# Patient Record
Sex: FEMALE | Race: WHITE | Employment: OTHER | URBAN - METROPOLITAN AREA
[De-identification: names, ages, dates, MRNs, and addresses within clinical notes are randomized per-mention and may not be internally consistent; named-entity substitution may affect disease eponyms.]

---

## 2018-01-25 ENCOUNTER — APPOINTMENT (OUTPATIENT)
Dept: ULTRASOUND IMAGING | Age: 83
DRG: 871 | End: 2018-01-25
Attending: HOSPITALIST
Payer: MEDICARE

## 2018-01-25 ENCOUNTER — HOSPITAL ENCOUNTER (INPATIENT)
Age: 83
LOS: 4 days | Discharge: NURSING, REHAB OR LONG-TERM CARE | DRG: 871 | End: 2018-01-29
Attending: EMERGENCY MEDICINE | Admitting: HOSPITALIST
Payer: MEDICARE

## 2018-01-25 ENCOUNTER — APPOINTMENT (OUTPATIENT)
Dept: NUCLEAR MEDICINE | Age: 83
DRG: 871 | End: 2018-01-25
Attending: HOSPITALIST
Payer: MEDICARE

## 2018-01-25 ENCOUNTER — APPOINTMENT (OUTPATIENT)
Dept: GENERAL RADIOLOGY | Age: 83
DRG: 871 | End: 2018-01-25
Attending: EMERGENCY MEDICINE
Payer: MEDICARE

## 2018-01-25 DIAGNOSIS — J18.9 COMMUNITY ACQUIRED PNEUMONIA, UNSPECIFIED LATERALITY: Primary | ICD-10-CM

## 2018-01-25 DIAGNOSIS — J96.01 ACUTE RESPIRATORY FAILURE WITH HYPOXIA (HCC): ICD-10-CM

## 2018-01-25 PROBLEM — N17.9 AKI (ACUTE KIDNEY INJURY) (HCC): Status: ACTIVE | Noted: 2018-01-25

## 2018-01-25 PROBLEM — R65.20 SEVERE SEPSIS (HCC): Status: ACTIVE | Noted: 2018-01-25

## 2018-01-25 PROBLEM — L97.929 LEG ULCER, LEFT (HCC): Status: ACTIVE | Noted: 2018-01-25

## 2018-01-25 PROBLEM — A41.9 SEVERE SEPSIS (HCC): Status: ACTIVE | Noted: 2018-01-25

## 2018-01-25 LAB
ALBUMIN SERPL-MCNC: 3 G/DL (ref 3.5–5)
ALBUMIN/GLOB SERPL: 0.7 {RATIO} (ref 1.1–2.2)
ALP SERPL-CCNC: 72 U/L (ref 45–117)
ALT SERPL-CCNC: 19 U/L (ref 12–78)
ANION GAP SERPL CALC-SCNC: 12 MMOL/L (ref 5–15)
APPEARANCE UR: ABNORMAL
ARTERIAL PATENCY WRIST A: YES
AST SERPL-CCNC: 45 U/L (ref 15–37)
ATRIAL RATE: 105 BPM
BACTERIA URNS QL MICRO: ABNORMAL /HPF
BASE EXCESS BLDA CALC-SCNC: 1.2 MMOL/L
BASOPHILS # BLD: 0 K/UL (ref 0–0.1)
BASOPHILS NFR BLD: 0 % (ref 0–1)
BDY SITE: ABNORMAL
BILIRUB SERPL-MCNC: 1.9 MG/DL (ref 0.2–1)
BILIRUB UR QL: NEGATIVE
BREATHS.SPONTANEOUS ON VENT: 26
BUN SERPL-MCNC: 34 MG/DL (ref 6–20)
BUN/CREAT SERPL: 20 (ref 12–20)
CALCIUM SERPL-MCNC: 8.6 MG/DL (ref 8.5–10.1)
CALCULATED R AXIS, ECG10: 63 DEGREES
CALCULATED T AXIS, ECG11: 57 DEGREES
CHLORIDE SERPL-SCNC: 95 MMOL/L (ref 97–108)
CK MB CFR SERPL CALC: 1.4 % (ref 0–2.5)
CK MB SERPL-MCNC: 6.2 NG/ML (ref 5–25)
CK SERPL-CCNC: 441 U/L (ref 26–192)
CO2 SERPL-SCNC: 25 MMOL/L (ref 21–32)
COLOR UR: ABNORMAL
CREAT SERPL-MCNC: 1.68 MG/DL (ref 0.55–1.02)
DIAGNOSIS, 93000: NORMAL
EOSINOPHIL # BLD: 0 K/UL (ref 0–0.4)
EOSINOPHIL NFR BLD: 0 % (ref 0–7)
EPITH CASTS URNS QL MICRO: ABNORMAL /LPF
ERYTHROCYTE [DISTWIDTH] IN BLOOD BY AUTOMATED COUNT: 15.3 % (ref 11.5–14.5)
FLUAV AG NPH QL IA: NEGATIVE
FLUBV AG NOSE QL IA: NEGATIVE
GAS FLOW.O2 O2 DELIVERY SYS: 2.5 L/MIN
GLOBULIN SER CALC-MCNC: 4.6 G/DL (ref 2–4)
GLUCOSE BLD STRIP.AUTO-MCNC: 120 MG/DL (ref 65–100)
GLUCOSE BLD STRIP.AUTO-MCNC: 124 MG/DL (ref 65–100)
GLUCOSE BLD STRIP.AUTO-MCNC: 126 MG/DL (ref 65–100)
GLUCOSE SERPL-MCNC: 178 MG/DL (ref 65–100)
GLUCOSE UR STRIP.AUTO-MCNC: NEGATIVE MG/DL
HCO3 BLDA-SCNC: 24 MMOL/L (ref 22–26)
HCT VFR BLD AUTO: 39.9 % (ref 35–47)
HGB BLD-MCNC: 13.6 G/DL (ref 11.5–16)
HGB UR QL STRIP: ABNORMAL
KETONES UR QL STRIP.AUTO: NEGATIVE MG/DL
LACTATE SERPL-SCNC: 1.5 MMOL/L (ref 0.4–2)
LACTATE SERPL-SCNC: 2.9 MMOL/L (ref 0.4–2)
LEUKOCYTE ESTERASE UR QL STRIP.AUTO: ABNORMAL
LYMPHOCYTES # BLD: 0.9 K/UL (ref 0.8–3.5)
LYMPHOCYTES NFR BLD: 7 % (ref 12–49)
MCH RBC QN AUTO: 30 PG (ref 26–34)
MCHC RBC AUTO-ENTMCNC: 34.1 G/DL (ref 30–36.5)
MCV RBC AUTO: 88.1 FL (ref 80–99)
MONOCYTES # BLD: 1.7 K/UL (ref 0–1)
MONOCYTES NFR BLD: 14 % (ref 5–13)
MUCOUS THREADS URNS QL MICRO: ABNORMAL /LPF
NEUTS SEG # BLD: 9.5 K/UL (ref 1.8–8)
NEUTS SEG NFR BLD: 79 % (ref 32–75)
NITRITE UR QL STRIP.AUTO: NEGATIVE
PCO2 BLDA: 32 MMHG (ref 35–45)
PH BLDA: 7.49 [PH] (ref 7.35–7.45)
PH UR STRIP: 5 [PH] (ref 5–8)
PLATELET # BLD AUTO: 146 K/UL (ref 150–400)
PO2 BLDA: 98 MMHG (ref 80–100)
POTASSIUM SERPL-SCNC: 3.2 MMOL/L (ref 3.5–5.1)
PROT SERPL-MCNC: 7.6 G/DL (ref 6.4–8.2)
PROT UR STRIP-MCNC: 30 MG/DL
Q-T INTERVAL, ECG07: 336 MS
QRS DURATION, ECG06: 80 MS
QTC CALCULATION (BEZET), ECG08: 435 MS
RBC # BLD AUTO: 4.53 M/UL (ref 3.8–5.2)
RBC #/AREA URNS HPF: ABNORMAL /HPF (ref 0–5)
SAO2 % BLD: 98 % (ref 92–97)
SAO2% DEVICE SAO2% SENSOR NAME: ABNORMAL
SERVICE CMNT-IMP: ABNORMAL
SODIUM SERPL-SCNC: 132 MMOL/L (ref 136–145)
SP GR UR REFRACTOMETRY: 1.02 (ref 1–1.03)
SPECIMEN SITE: ABNORMAL
TROPONIN I SERPL-MCNC: 0.58 NG/ML
UA: UC IF INDICATED,UAUC: ABNORMAL
UROBILINOGEN UR QL STRIP.AUTO: 0.2 EU/DL (ref 0.2–1)
VENTRICULAR RATE, ECG03: 101 BPM
WBC # BLD AUTO: 12.1 K/UL (ref 3.6–11)
WBC URNS QL MICRO: ABNORMAL /HPF (ref 0–4)

## 2018-01-25 PROCEDURE — 87086 URINE CULTURE/COLONY COUNT: CPT | Performed by: HOSPITALIST

## 2018-01-25 PROCEDURE — 71045 X-RAY EXAM CHEST 1 VIEW: CPT

## 2018-01-25 PROCEDURE — 74011250637 HC RX REV CODE- 250/637: Performed by: EMERGENCY MEDICINE

## 2018-01-25 PROCEDURE — 96361 HYDRATE IV INFUSION ADD-ON: CPT

## 2018-01-25 PROCEDURE — 81001 URINALYSIS AUTO W/SCOPE: CPT | Performed by: HOSPITALIST

## 2018-01-25 PROCEDURE — 74011250637 HC RX REV CODE- 250/637: Performed by: HOSPITALIST

## 2018-01-25 PROCEDURE — 87804 INFLUENZA ASSAY W/OPTIC: CPT | Performed by: EMERGENCY MEDICINE

## 2018-01-25 PROCEDURE — 85025 COMPLETE CBC W/AUTO DIFF WBC: CPT | Performed by: EMERGENCY MEDICINE

## 2018-01-25 PROCEDURE — 77030038269 HC DRN EXT URIN PURWCK BARD -A

## 2018-01-25 PROCEDURE — 74011250636 HC RX REV CODE- 250/636: Performed by: HOSPITALIST

## 2018-01-25 PROCEDURE — 65660000000 HC RM CCU STEPDOWN

## 2018-01-25 PROCEDURE — 96365 THER/PROPH/DIAG IV INF INIT: CPT

## 2018-01-25 PROCEDURE — 74011636637 HC RX REV CODE- 636/637: Performed by: EMERGENCY MEDICINE

## 2018-01-25 PROCEDURE — 82803 BLOOD GASES ANY COMBINATION: CPT | Performed by: EMERGENCY MEDICINE

## 2018-01-25 PROCEDURE — 77030029684 HC NEB SM VOL KT MONA -A

## 2018-01-25 PROCEDURE — 84484 ASSAY OF TROPONIN QUANT: CPT | Performed by: HOSPITALIST

## 2018-01-25 PROCEDURE — 74011000250 HC RX REV CODE- 250: Performed by: EMERGENCY MEDICINE

## 2018-01-25 PROCEDURE — A9270 NON-COVERED ITEM OR SERVICE: HCPCS | Performed by: EMERGENCY MEDICINE

## 2018-01-25 PROCEDURE — 36415 COLL VENOUS BLD VENIPUNCTURE: CPT | Performed by: EMERGENCY MEDICINE

## 2018-01-25 PROCEDURE — 77010033678 HC OXYGEN DAILY

## 2018-01-25 PROCEDURE — 51701 INSERT BLADDER CATHETER: CPT

## 2018-01-25 PROCEDURE — 80053 COMPREHEN METABOLIC PANEL: CPT | Performed by: EMERGENCY MEDICINE

## 2018-01-25 PROCEDURE — 83605 ASSAY OF LACTIC ACID: CPT | Performed by: EMERGENCY MEDICINE

## 2018-01-25 PROCEDURE — 94640 AIRWAY INHALATION TREATMENT: CPT

## 2018-01-25 PROCEDURE — 93306 TTE W/DOPPLER COMPLETE: CPT

## 2018-01-25 PROCEDURE — A9540 TC99M MAA: HCPCS

## 2018-01-25 PROCEDURE — 94761 N-INVAS EAR/PLS OXIMETRY MLT: CPT

## 2018-01-25 PROCEDURE — 77030011255 HC DSG AQUACEL AG BMS -A

## 2018-01-25 PROCEDURE — 76700 US EXAM ABDOM COMPLETE: CPT

## 2018-01-25 PROCEDURE — 82962 GLUCOSE BLOOD TEST: CPT

## 2018-01-25 PROCEDURE — 93005 ELECTROCARDIOGRAM TRACING: CPT

## 2018-01-25 PROCEDURE — 36600 WITHDRAWAL OF ARTERIAL BLOOD: CPT | Performed by: EMERGENCY MEDICINE

## 2018-01-25 PROCEDURE — 77030011943

## 2018-01-25 PROCEDURE — 77030009526 HC GEL CARSYN MDII -A

## 2018-01-25 PROCEDURE — 87040 BLOOD CULTURE FOR BACTERIA: CPT | Performed by: EMERGENCY MEDICINE

## 2018-01-25 PROCEDURE — 82550 ASSAY OF CK (CPK): CPT | Performed by: HOSPITALIST

## 2018-01-25 PROCEDURE — 74011250636 HC RX REV CODE- 250/636: Performed by: EMERGENCY MEDICINE

## 2018-01-25 PROCEDURE — 96366 THER/PROPH/DIAG IV INF ADDON: CPT

## 2018-01-25 PROCEDURE — 99285 EMERGENCY DEPT VISIT HI MDM: CPT

## 2018-01-25 RX ORDER — NYSTATIN 100000 [USP'U]/G
POWDER TOPICAL 2 TIMES DAILY
Status: DISCONTINUED | OUTPATIENT
Start: 2018-01-25 | End: 2018-01-29 | Stop reason: HOSPADM

## 2018-01-25 RX ORDER — ACETAMINOPHEN 500 MG
1000 TABLET ORAL ONCE
Status: COMPLETED | OUTPATIENT
Start: 2018-01-25 | End: 2018-01-25

## 2018-01-25 RX ORDER — PREDNISONE 20 MG/1
60 TABLET ORAL
Status: COMPLETED | OUTPATIENT
Start: 2018-01-25 | End: 2018-01-25

## 2018-01-25 RX ORDER — ACETAMINOPHEN 325 MG/1
650 TABLET ORAL
Status: DISCONTINUED | OUTPATIENT
Start: 2018-01-25 | End: 2018-01-29 | Stop reason: HOSPADM

## 2018-01-25 RX ORDER — ACETAMINOPHEN 500 MG
1000 TABLET ORAL
COMMUNITY

## 2018-01-25 RX ORDER — LEVOFLOXACIN 5 MG/ML
750 INJECTION, SOLUTION INTRAVENOUS
Status: COMPLETED | OUTPATIENT
Start: 2018-01-25 | End: 2018-01-25

## 2018-01-25 RX ORDER — MAGNESIUM SULFATE 100 %
4 CRYSTALS MISCELLANEOUS AS NEEDED
Status: DISCONTINUED | OUTPATIENT
Start: 2018-01-25 | End: 2018-01-29 | Stop reason: HOSPADM

## 2018-01-25 RX ORDER — IBUPROFEN 600 MG/1
600 TABLET ORAL
Status: DISCONTINUED | OUTPATIENT
Start: 2018-01-25 | End: 2018-01-25

## 2018-01-25 RX ORDER — POTASSIUM CHLORIDE 750 MG/1
40 TABLET, FILM COATED, EXTENDED RELEASE ORAL
Status: COMPLETED | OUTPATIENT
Start: 2018-01-25 | End: 2018-01-25

## 2018-01-25 RX ORDER — ONDANSETRON 2 MG/ML
4 INJECTION INTRAMUSCULAR; INTRAVENOUS
Status: DISCONTINUED | OUTPATIENT
Start: 2018-01-25 | End: 2018-01-29 | Stop reason: HOSPADM

## 2018-01-25 RX ORDER — DEXTROSE 50 % IN WATER (D50W) INTRAVENOUS SYRINGE
12.5-25 AS NEEDED
Status: DISCONTINUED | OUTPATIENT
Start: 2018-01-25 | End: 2018-01-29 | Stop reason: HOSPADM

## 2018-01-25 RX ORDER — ACETAMINOPHEN 325 MG/1
650 TABLET ORAL
COMMUNITY
End: 2018-01-25 | Stop reason: DRUGHIGH

## 2018-01-25 RX ORDER — SODIUM CHLORIDE 9 MG/ML
1000 INJECTION, SOLUTION INTRAVENOUS ONCE
Status: COMPLETED | OUTPATIENT
Start: 2018-01-25 | End: 2018-01-25

## 2018-01-25 RX ORDER — SODIUM CHLORIDE 0.9 % (FLUSH) 0.9 %
5-10 SYRINGE (ML) INJECTION EVERY 8 HOURS
Status: DISCONTINUED | OUTPATIENT
Start: 2018-01-25 | End: 2018-01-29 | Stop reason: HOSPADM

## 2018-01-25 RX ORDER — DOCUSATE SODIUM 100 MG/1
100 CAPSULE, LIQUID FILLED ORAL 2 TIMES DAILY
Status: DISCONTINUED | OUTPATIENT
Start: 2018-01-25 | End: 2018-01-29 | Stop reason: HOSPADM

## 2018-01-25 RX ORDER — SODIUM CHLORIDE 0.9 % (FLUSH) 0.9 %
5-10 SYRINGE (ML) INJECTION AS NEEDED
Status: DISCONTINUED | OUTPATIENT
Start: 2018-01-25 | End: 2018-01-29 | Stop reason: HOSPADM

## 2018-01-25 RX ORDER — INSULIN LISPRO 100 [IU]/ML
INJECTION, SOLUTION INTRAVENOUS; SUBCUTANEOUS
Status: DISCONTINUED | OUTPATIENT
Start: 2018-01-25 | End: 2018-01-27

## 2018-01-25 RX ORDER — VANCOMYCIN 2 GRAM/500 ML IN 0.9 % SODIUM CHLORIDE INTRAVENOUS
2000
Status: COMPLETED | OUTPATIENT
Start: 2018-01-25 | End: 2018-01-26

## 2018-01-25 RX ORDER — ASPIRIN 81 MG/1
81 TABLET ORAL DAILY
Status: DISCONTINUED | OUTPATIENT
Start: 2018-01-25 | End: 2018-01-29 | Stop reason: HOSPADM

## 2018-01-25 RX ORDER — OSELTAMIVIR PHOSPHATE 30 MG/1
30 CAPSULE ORAL ONCE
Status: COMPLETED | OUTPATIENT
Start: 2018-01-25 | End: 2018-01-25

## 2018-01-25 RX ORDER — LEVOFLOXACIN 750 MG/1
750 TABLET ORAL
COMMUNITY
Start: 2018-01-24 | End: 2018-01-29

## 2018-01-25 RX ORDER — OSELTAMIVIR PHOSPHATE 30 MG/1
30 CAPSULE ORAL DAILY
Status: DISCONTINUED | OUTPATIENT
Start: 2018-01-26 | End: 2018-01-26

## 2018-01-25 RX ORDER — IPRATROPIUM BROMIDE AND ALBUTEROL SULFATE 2.5; .5 MG/3ML; MG/3ML
3 SOLUTION RESPIRATORY (INHALATION) ONCE
Status: COMPLETED | OUTPATIENT
Start: 2018-01-25 | End: 2018-01-25

## 2018-01-25 RX ORDER — SODIUM CHLORIDE 9 MG/ML
10 INJECTION, SOLUTION INTRAVENOUS CONTINUOUS
Status: DISCONTINUED | OUTPATIENT
Start: 2018-01-25 | End: 2018-01-27

## 2018-01-25 RX ORDER — HEPARIN SODIUM 5000 [USP'U]/ML
5000 INJECTION, SOLUTION INTRAVENOUS; SUBCUTANEOUS EVERY 8 HOURS
Status: DISCONTINUED | OUTPATIENT
Start: 2018-01-25 | End: 2018-01-29 | Stop reason: HOSPADM

## 2018-01-25 RX ORDER — LEVOFLOXACIN 5 MG/ML
750 INJECTION, SOLUTION INTRAVENOUS
Status: DISCONTINUED | OUTPATIENT
Start: 2018-01-27 | End: 2018-01-26

## 2018-01-25 RX ADMIN — SODIUM CHLORIDE 1000 ML: 900 INJECTION, SOLUTION INTRAVENOUS at 06:28

## 2018-01-25 RX ADMIN — Medication 10 ML: at 14:20

## 2018-01-25 RX ADMIN — VANCOMYCIN HYDROCHLORIDE 2000 MG: 10 INJECTION, POWDER, LYOPHILIZED, FOR SOLUTION INTRAVENOUS at 12:25

## 2018-01-25 RX ADMIN — SODIUM CHLORIDE 130 ML/HR: 900 INJECTION, SOLUTION INTRAVENOUS at 22:00

## 2018-01-25 RX ADMIN — PREDNISONE 60 MG: 20 TABLET ORAL at 07:37

## 2018-01-25 RX ADMIN — SODIUM CHLORIDE 1000 ML: 900 INJECTION, SOLUTION INTRAVENOUS at 05:51

## 2018-01-25 RX ADMIN — OSELTAMIVIR PHOSPHATE 30 MG: 30 CAPSULE ORAL at 07:45

## 2018-01-25 RX ADMIN — LEVOFLOXACIN 750 MG: 5 INJECTION, SOLUTION INTRAVENOUS at 06:56

## 2018-01-25 RX ADMIN — HEPARIN SODIUM 5000 UNITS: 5000 INJECTION, SOLUTION INTRAVENOUS; SUBCUTANEOUS at 17:10

## 2018-01-25 RX ADMIN — Medication 10 ML: at 22:04

## 2018-01-25 RX ADMIN — POTASSIUM CHLORIDE 40 MEQ: 750 TABLET, FILM COATED, EXTENDED RELEASE ORAL at 14:18

## 2018-01-25 RX ADMIN — IPRATROPIUM BROMIDE AND ALBUTEROL SULFATE 3 ML: .5; 3 SOLUTION RESPIRATORY (INHALATION) at 06:24

## 2018-01-25 RX ADMIN — ACETAMINOPHEN 1000 MG: 500 TABLET ORAL at 05:46

## 2018-01-25 RX ADMIN — ASPIRIN 81 MG: 81 TABLET, COATED ORAL at 14:18

## 2018-01-25 RX ADMIN — SODIUM CHLORIDE 500 ML: 900 INJECTION, SOLUTION INTRAVENOUS at 07:34

## 2018-01-25 RX ADMIN — SODIUM CHLORIDE 500 ML: 900 INJECTION, SOLUTION INTRAVENOUS at 08:54

## 2018-01-25 RX ADMIN — SODIUM CHLORIDE 130 ML/HR: 900 INJECTION, SOLUTION INTRAVENOUS at 12:25

## 2018-01-25 NOTE — H&P
Hospitalist Admission Note    NAME: Maurizio Urbina   :  1925   MRN:  [id number]     Date/Time:  2018 8:06 AM    Patient PCP: Adrienne Mcdonald MD  ______________________________________________________________________   Assessment & Plan:  Severe sepsis, POA unclear source. No UA yet; straight cath ordered for UA and culture  PRACHI Cr 1.68, previously 1.08 2016. Suspect dehydration, rule out uti  Acute hypoxic respiratory failure  86% RA, 95% 3L. Suspect due to sepsis. Possible flu or pneumonia but having minimal pulmonary symptom and lungs clear. PE possible but low risk  Hypovolemic hyponatremia Na 132  Hypokalemia 3.2  Hyperglycemia   Generalized weakness due to severe sepsis, POA  Chronic left posterior calf ulcer since 2017. Wound does not appear infected  PAD involving lower legs and carotid artery per daughter  Cholelithiasis with elevated AST and t. Bili  Hx afib post hip ORIF . Not on anticoagulation  COPD, not in acute exacerbation    --empiric abx with Levaquin to cover pulm/ and vancomycin for potential soft tissue infection. Continue with tamiflu for possible flu as well  --IVF bolus for total 3L and then 130ml/hr  --accucheks and SSI  --kdur replacement  --check cardiac enzymes. V/Q scan. --consult wound care  --hold atenolol and lasix due to hypotension  --straight cath for UA and urine culture. Check abdominal US to look at kidneys and liver/GB  --wound care consult, pt/ot consult, fall precaution. Body mass index is 29.19 kg/(m^2). Code:  Discussed with patient and daughter Magi Simoning in room. No advance directive. Does not want heroic measures. DNR/DNI  DVT prophylaxis: heparin  Surrogate decision maker:  Silvia Butler cell [phone]    Addendum:    UTI, POA  --  UA c/w with uti. This is likely source of sepsis. Continue levaquin and follow up culture.   Can probable discontinue vancomycin soon if preliminary blood cultures negative. Elevated trop 0.58 --  ekg looks like afib but exam seems more c/w with SR. V/Q scan low probability for PE. Troponin leak could be from sepsis or [facility] [place]. Add aspirin 81mg daily, check echo, repeat ekg and enzymes in AM.  Can resume atenolol in AM if BP not low. Would not anticoagulate at this time since very weak and high fall risk. Subjective:   CHIEF COMPLAINT:  Generalized weakness, SOB    HISTORY OF PRESENT ILLNESS:     Sandra Burch is a 80 y.o.  female with copd, htn, gallstones, arthritis, [facility] by EMS for not been feeling well for 1 week. Tested negative for flu. Yesterday assisted living doctor called daughter reporting patient with high fever and tachycardia, and felt patient not safe to spend night alone so daughter stayed with her. CXR done last night but no report. Overnight had some episodes of delirium/confusion, alternating from chills to drenched with sweat and very weak, needed assistance to get up to bathroom, poor appetite last night. Started on levofloxacin 750mg last night by pcp. Patient reports generalized fatigue, SOB and decreased urination. Denies cough, wheezing, CP, abdominal pain, n/v/diarrhea. No bleeding. No dysuria. Has chronic left posterior calf ulcer since Halloween when someone's rollator ran into her and been going to wound care clinic. Has chronic discoloration to left leg that is not changed. Has pain in left leg but denies it is worse than baseline.   Hx of ulcer in right leg that took 1 year to heal.      Past Medical History:   Diagnosis Date    Arthritis     Chronic obstructive pulmonary disease (HCC)     Gastrointestinal disorder     gallstones    Hypertension     Iron deficiency     Leg ulcer ([facility] [address]     Macular degeneration     OA (osteoarthritis) of shoulder     Spinal stenosis       Past Surgical History:   Procedure Laterality Date    HX HEENT cataract surgery with lens implants    HX ORTHOPAEDIC      right hip surgery     Social History   Substance Use Topics    Smoking status: Former Smoker     Quit date: 4/2/1991    Smokeless tobacco: Not on file    Alcohol use Yes    [employer] assisted living, uses rollator without assistance at baseline    Family History   Problem Relation Age of Onset    Cancer Mother      jaw;     Stroke Mother     Other Father      rheumatic fever     Allergies   Allergen Reactions    Latex Itching        Prior to Admission medications    Medication Sig Start Date End Date Taking? Authorizing Provider   acetaminophen (TYLENOL) 325 mg tablet Take 650 mg by mouth every four (4) hours as needed for Pain. Yes Demetrius Meza MD   atenolol (TENORMIN) 50 mg tablet TAKE 1 TABLET BY MOUTH AT BEDTIME 8/31/17  Yes Brooks Stoll MD   furosemide (LASIX) 40 mg tablet TAKE ONE TABLET BY MOUTH DAILY 5/31/16  Yes Brooks Stoll MD     REVIEW OF SYSTEMS:  POSITIVE= Bold.   Negative = normal text  General:  fever, chills, sweats, generalized weakness, weight loss/gain, loss of appetite  Eyes:  blurred vision, eye pain, loss of vision, diplopia  Ear Nose and Throat:  rhinorrhea, pharyngitis  Respiratory:   cough, sputum production, SOB, wheezing, ARREOLA, pleuritic pain  Cardiology:  chest pain, palpitations, orthopnea, PND, edema, syncope   Gastrointestinal:  abdominal pain, N/V, dysphagia, diarrhea, constipation, bleeding  Genitourinary:  frequency, urgency, dysuria, hematuria, incontinence  Muskuloskeletal :  arthralgia, myalgia  Hematology:  easy bruising, bleeding, lymphadenopathy  Dermatological:  rash, ulceration, pruritis  Endocrine:  hot flashes or polydipsia  Neurological:  headache, dizziness, confusion, focal weakness, paresthesia, memory loss, gait disturbance  Psychological: anxiety, depression, agitation      Objective:   VITALS:    Visit Vitals    BP (!) 103/39    Pulse 76    Temp (!) 100.6 °F (38.1 °C)    Resp 23    Ht 5' 8\" (1.727 m)    Wt 87.1 kg (192 lb)    SpO2 94%    BMI 29.19 kg/m2     Temp (24hrs), Av.6 °F (39.2 °C), Min:100.6 °F (38.1 °C), Max:104.5 °F (40.3 °C)    Body mass index is 29.19 kg/(m^2). PHYSICAL EXAM:    General:    Alert, cooperative, no distress, appears stated age. HEENT: Atraumatic, anicteric sclerae, pink conjunctivae     No oral ulcers, mucosa very dry, dentures in place, throat clear. Hearing intact. Neck:  Supple, symmetrical,  thyroid: non tender. Bilateral carotid bruit  Lungs:   Few crackles in bases otherwise clear. No Wheezing or Rhonchi. Good air movement  Chest wall:  No tenderness  No Accessory muscle use. Heart:   Regular  rhythm,  2/6 systolic  murmur   No gallop. No edema. Abdomen:   Soft, obese, no tenderness including in RUQ. Not distended. Bowel sounds normal. No masses  Extremities: No cyanosis. No clubbing  Skin:     Not pale Not Jaundiced. Exam of leg ulcer limited as patient very weak and difficulty keeping leg elevated and too weak to turn fully to her side. Lemon size ulcer in left posterior calf with yellow pink base, no purulent discharge, no foul odor. Redness in left anterior tibia without increased warmth   Psych:  Good insight. Not depressed. Not anxious or agitated. Neurologic: EOMs intact. No facial asymmetry. No aphasia or slurred speech. Symmetrical strength 4/5 arms, legs 3/5 on right and 2/5 on left, Alert and oriented X 3.    Peripheral pulse: Bilateral, DP, 1+  Capillary refill:  normal    IMAGING RESULTS:   [x]       I have personally reviewed the actual   [x]     CXR  []     CT scan  CXR: hyperinflation, lungs clear, no infiltrate or pulmonary edema  CT :  EKG:     ________________________________________________________________________  Care Plan discussed with:    Comments   Patient y    Family  y Daughter, sister bedside   RN y    Care Manager                    Consultant: ________________________________________________________________________  Prophylaxis:  GI none   DVT heparin   ________________________________________________________________________  Recommended Disposition:   Home with Family    HH/PT/OT/RN    SNF/LTC y   DEDE    ________________________________________________________________________  Code Status:  Full Code    DNR/DNI y   ________________________________________________________________________  TOTAL TIME:  65 minutes      Comments    y Reviewed previous records       ______________________________________________________________________  Matilda Held, MD      Procedures: see electronic medical records for all procedures/Xrays and details which were not copied into this note but were reviewed prior to creation of Plan.     LAB DATA REVIEWED:    Recent Results (from the past 24 hour(s))   EKG, 12 LEAD, INITIAL    Collection Time: 01/25/18  5:05 AM   Result Value Ref Range    Ventricular Rate 101 BPM    Atrial Rate 105 BPM    QRS Duration 80 ms    Q-T Interval 336 ms    QTC Calculation (Bezet) 435 ms    Calculated R Axis 63 degrees    Calculated T Axis 57 degrees    Diagnosis       ** Poor data quality, interpretation may be adversely affected  Atrial fibrillation with rapid ventricular response  Possible Anterior infarct , age undetermined  No previous ECGs available     CULTURE, BLOOD    Collection Time: 01/25/18  5:25 AM   Result Value Ref Range    Special Requests: NO SPECIAL REQUESTS      Culture result: NO GROWTH AFTER 2 HOURS     LACTIC ACID    Collection Time: 01/25/18  5:25 AM   Result Value Ref Range    Lactic acid 2.9 (HH) 0.4 - 2.0 MMOL/L   CULTURE, BLOOD    Collection Time: 01/25/18  5:25 AM   Result Value Ref Range    Special Requests: NO SPECIAL REQUESTS      Culture result: NO GROWTH AFTER 1 HOUR     METABOLIC PANEL, COMPREHENSIVE    Collection Time: 01/25/18  5:25 AM   Result Value Ref Range    Sodium 132 (L) 136 - 145 mmol/L    Potassium 3.2 (L) 3.5 - 5.1 mmol/L    Chloride 95 (L) 97 - 108 mmol/L    CO2 25 21 - 32 mmol/L    Anion gap 12 5 - 15 mmol/L    Glucose 178 (H) 65 - 100 mg/dL    BUN 34 (H) 6 - 20 MG/DL    Creatinine 1.68 (H) 0.55 - 1.02 MG/DL    BUN/Creatinine ratio 20 12 - 20      GFR est AA 35 (L) >60 ml/min/1.73m2    GFR est non-AA 28 (L) >60 ml/min/1.73m2    Calcium 8.6 8.5 - 10.1 MG/DL    Bilirubin, total 1.9 (H) 0.2 - 1.0 MG/DL    ALT (SGPT) 19 12 - 78 U/L    AST (SGOT) 45 (H) 15 - 37 U/L    Alk. phosphatase 72 45 - 117 U/L    Protein, total 7.6 6.4 - 8.2 g/dL    Albumin 3.0 (L) 3.5 - 5.0 g/dL    Globulin 4.6 (H) 2.0 - 4.0 g/dL    A-G Ratio 0.7 (L) 1.1 - 2.2     CBC WITH AUTOMATED DIFF    Collection Time: 01/25/18  5:25 AM   Result Value Ref Range    WBC 12.1 (H) 3.6 - 11.0 K/uL    RBC 4.53 3.80 - 5.20 M/uL    HGB 13.6 11.5 - 16.0 g/dL    HCT 39.9 35.0 - 47.0 %    MCV 88.1 80.0 - 99.0 FL    MCH 30.0 26.0 - 34.0 PG    MCHC 34.1 30.0 - 36.5 g/dL    RDW 15.3 (H) 11.5 - 14.5 %    PLATELET 199 (L) 667 - 400 K/uL    NEUTROPHILS 79 (H) 32 - 75 %    LYMPHOCYTES 7 (L) 12 - 49 %    MONOCYTES 14 (H) 5 - 13 %    EOSINOPHILS 0 0 - 7 %    BASOPHILS 0 0 - 1 %    ABS. NEUTROPHILS 9.5 (H) 1.8 - 8.0 K/UL    ABS. LYMPHOCYTES 0.9 0.8 - 3.5 K/UL    ABS. MONOCYTES 1.7 (H) 0.0 - 1.0 K/UL    ABS. EOSINOPHILS 0.0 0.0 - 0.4 K/UL    ABS.  BASOPHILS 0.0 0.0 - 0.1 K/UL   INFLUENZA A & B AG (RAPID TEST)    Collection Time: 01/25/18  5:58 AM   Result Value Ref Range    Influenza A Antigen NEGATIVE  NEG      Influenza B Antigen NEGATIVE  NEG     BLOOD GAS, ARTERIAL    Collection Time: 01/25/18  7:24 AM   Result Value Ref Range    pH 7.49 (H) 7.35 - 7.45      PCO2 32 (L) 35.0 - 45.0 mmHg    PO2 98 80 - 100 mmHg    O2 SAT 98 (H) 92 - 97 %    BICARBONATE 24 22 - 26 mmol/L    BASE EXCESS 1.2 mmol/L    O2 METHOD NASAL O2      O2 FLOW RATE 2.50 L/min    SPONTANEOUS RATE 26.0      Sample source ARTERIAL      SITE LEFT RADIAL      JAMESON'S TEST YES

## 2018-01-25 NOTE — IP AVS SNAPSHOT
[address] [facility] 
[phone] Patient: Jacques Bill MRN: [id number] DJC:4/58/[birth year]11 About your hospitalization You were admitted on:  January 25, 2018 You last received care in the:  [facility] [place] [facility] You were discharged on:  January 29, 2018 Why you were hospitalized Your primary diagnosis was:  Not on File Your diagnoses also included:  Severe Sepsis (Hcc), [place] (Acute Kidney Injury) (Hcc), Acute Respiratory Failure With Hypoxia (Hcc), Leg Ulcer, Left (Hcc), Viral Syndrome Follow-up Information Follow up With Details Comments Contact Info Vinod Lozada MD  [facility] ELENA RANDHAWA will schedule patient's follow up appointment. [address] 
[phone] Discharge Orders None A check keenan indicates which time of day the medication should be taken. My Medications START taking these medications Instructions Each Dose to Equal  
 Morning Noon Evening Bedtime  
 oseltamivir 30 mg capsule Commonly known as:  TAMIFLU Your last dose was: Your next dose is: Take 1 Cap by mouth two (2) times a day for 2 days. 30 mg CHANGE how you take these medications Instructions Each Dose to Equal  
 Morning Noon Evening Bedtime  
 acetaminophen 500 mg tablet Commonly known as:  TYLENOL What changed:  Another medication with the same name was removed. Continue taking this medication, and follow the directions you see here. Your last dose was: Your next dose is: Take 1,000 mg by mouth nightly. 1000 mg CONTINUE taking these medications Instructions Each Dose to Equal  
 Morning Noon Evening Bedtime  
 atenolol 50 mg tablet Commonly known as:  TENORMIN Your last dose was: Your next dose is: TAKE 1 TABLET BY MOUTH AT BEDTIME  
     
   
   
   
  
 furosemide 40 mg tablet Commonly known as:  LASIX Your last dose was: Your next dose is: TAKE ONE TABLET BY MOUTH DAILY  
     
   
   
   
  
  
STOP taking these medications   
 levoFLOXacin 750 mg tablet Commonly known as:  Haylie Rosas Where to Get Your Medications Information on where to get these meds will be given to you by the nurse or doctor. ! Ask your nurse or doctor about these medications  
  oseltamivir 30 mg capsule Discharge Instructions HOSPITALIST DISCHARGE INSTRUCTIONS 
 
NAME: Emiliano Leung :  1925 MRN:  [id number] Date/Time:  2018 12:00 PM 
 
ADMIT DATE: 2018 DISCHARGE DATE: 2018 DISCHARGE DIAGNOSIS: 
Severe sepsis, POA - resolved Likely due to Viral syndrome - Flu test neg bu pt high risk-- complete 2 more days of Tamiflu Possible UTI POA- ruled out with neg urine Cx Acute hypoxic respiratory failure  86% RA, 95% 3L.  Suspect due to sepsis, taper off oxygen as able COPD, not in acute exacerbation PRACHI POA- resolved Due to Dehydration POA- resolved Hypovolemic hyponatremia Na 132 POA- now resolved Hypokalemia 3.2 POA- cont PO K+ rich food with Daily lasix as instructed Sundowning/Delirium ( night)- resolved now Elevated trop 0.58 POA- resolved, likely due to sepsis/PRACHI, echo normal 
Generalized weakness due to severe sepsis, POA Chronic left posterior calf ulcer since 2017.  Wound does not appear infected, cont general wound care as before PAD involving lower legs and carotid artery per daughter Cholelithiasis POA Hx afib post hip ORIF  Active Problems: 
  Severe sepsis ([address] (2018) PRACHI (acute kidney injury) ([address] (2018) Acute respiratory failure with hypoxia ([address] (2018) Leg ulcer, left ([address] (2018) Viral syndrome (2018) MEDICATIONS: 
As per medication reconciliation  list 
· It is important that you take the medication exactly as they are prescribed. · Keep your medication in the bottles provided by the pharmacist and keep a list of the medication names, dosages, and times to be taken in your wallet. · Do not take other medications without consulting your doctor. Pain Management: per above medications What to do at [facility] Recommended diet:  Cardiac Diet Recommended activity: Activity as tolerated If you have questions regarding the hospital related prescriptions or hospital related issues please call Lizet Kirby at . If you experience any of the following symptoms then please call your primary care physician or return to the emergency room if you cannot get hold of your doctor: 
Fever, chills, nausea, vomiting, diarrhea, change in mentation, falling, bleeding, shortness of breath, Follow Up: 
Dr. Elaine Vitale MD  you are to call and set up an appointment to see them in 7-10 days. Information obtained by : 
I understand that if any problems occur once I am at home I am to contact my physician. I understand and acknowledge receipt of the instructions indicated above. Physician's or R.N.'s Signature                                                                  Date/Time Patient or Representative Signature                                                          Date/Time "[employer]" Announcement We are excited to announce that we are making your provider's discharge notes available to you in "[employer]".   You will see these notes when they are completed and signed by the physician that discharged you from your recent hospital stay. If you have any questions or concerns about any information you see in [employer], please call the Health Information Department where you were seen or reach out to your Primary Care Provider for more information about your plan of care. Introducing [facility]! Marques Pittman introduces [employer] patient portal. Now you can access parts of your medical record, email your doctor's office, and request medication refills online. 1. In your internet browser, go to [email or web address] [email or web address] 2. Click on the First Time User? Click Here link in the Sign In box. You will see the New Member Sign Up page. 3. Enter your [employer] Access Code exactly as it appears below. You will not need to use this code after youve completed the sign-up process. If you do not sign up before the expiration date, you must request a new code. · [employer] Access Code: [email or web address] Expires: 4/29/2018 12:15 PM 
 
4. Enter the last four digits of your Social Security Number (xxxx) and Date of Birth (mm/dd/yyyy) as indicated and click Submit. You will be taken to the next sign-up page. 5. Create a [employer] ID. This will be your [employer] login ID and cannot be changed, so think of one that is secure and easy to remember. 6. Create a [employer] password. You can change your password at any time. 7. Enter your Password Reset Question and Answer. This can be used at a later time if you forget your password. 8. Enter your e-mail address. You will receive e-mail notification when new information is available in [address] 9. Click Sign Up. You can now view and download portions of your medical record. 10. Click the Download Summary menu link to download a portable copy of your medical information.  
 
If you have questions, please visit the Frequently Asked Questions section of the [employer]. Remember, MyChart is NOT to be used for urgent needs. For medical emergencies, dial 911. Now available from your iPhone and Android! Unresulted Labs-Please follow up with your PCP about these lab tests Order Current Status CULTURE, BLOOD Preliminary result CULTURE, BLOOD Preliminary result Providers Seen During Your Hospitalization Provider Specialty Primary office phone Chelita Henriquez MD Emergency Medicine [phone] Mann Badillo MD Internal Medicine [phone] Cruzito Castillo MD Internal Medicine [phone] Your Primary Care Physician (PCP) Primary Care Physician Office Phone Office Fax Lazarus Nails [phone] You are allergic to the following Allergen Reactions Latex Itching Recent Documentation Height Weight BMI Smoking Status 1.727 m 91.1 kg 30.55 kg/m2 Former Smoker Emergency Contacts Name Discharge Info Relation Home Work Mobile 14 Peymane  Président Girish CAREGIVER [3] Child [2] [phone] Trino Smith DISCHARGE CAREGIVER [3] Child [2] [phone] Patient Belongings The following personal items are in your possession at time of discharge: 
  Dental Appliances: Uppers, Lowers, At bedside  Visual Aid: Glasses      Home Medications: None   Jewelry: None  Clothing: None    Other Valuables: Eyeglasses Please provide this summary of care documentation to your next provider. Signatures-by signing, you are acknowledging that this After Visit Summary has been reviewed with you and you have received a copy. Patient Signature:  ____________________________________________________________ Date:  ____________________________________________________________  
  
Angelica Abraham Provider Signature:  ____________________________________________________________ Date:  ____________________________________________________________

## 2018-01-25 NOTE — ED NOTES
TRANSFER - OUT REPORT:    Verbal report given to Juan R López RN (name) on Nelson Patton  being transferred to [facility] [address] (unit) for routine progression of care       Report consisted of patients Situation, Background, Assessment and   Recommendations(SBAR). Information from the following report(s) SBAR, Kardex, ED Summary, MAR, Accordion and Recent Results was reviewed with the receiving nurse. Lines:   Peripheral IV 01/25/18 Left Hand (Active)   Site Assessment Clean, dry, & intact 1/25/2018  5:52 AM   Phlebitis Assessment 0 1/25/2018  5:52 AM   Infiltration Assessment 0 1/25/2018  5:52 AM   Dressing Type Tape;Transparent 1/25/2018  5:52 AM   Hub Color/Line Status Pink;Flushed;Patent 1/25/2018  5:52 AM   Action Taken Blood drawn 1/25/2018  5:52 AM        Opportunity for questions and clarification was provided. Patient transported with:   Monitor          Verbal report given to Juan R López RN (name) on Nelson Patton being transferred to [facility] [address] (unit) for routine progression of care    Report consisted of patient's Situation, Background, Assessment and Recommendations (SBAR)    Information from the following report(s)  SBAR, Kardex, ED Summary, Intake/Output, MAR, Accordion and Recent Results was reviewed with the receiving nurse. Opportunity for questions and clarification was provided.     Patient transported with:  Monitor    Last Filed Values:  Temp: 98.7 °F (37.1 °C) (01/25/18 0900)  Pulse (Heart Rate): 75 (01/25/18 0815)  Resp Rate: 22 (01/25/18 0815)  O2 Sat (%): 96 % (01/25/18 0815)  BP: (!) 111/37 (01/25/18 0815)  MAP (Monitor): (!) 55 (01/25/18 0815)  MAP (Calculated): 103 (01/25/18 0502)  Level of Consciousness: Alert (01/25/18 0502)      WBC   Date Value Ref Range Status   01/25/2018 12.1 (H) 3.6 - 11.0 K/uL Final   04/02/2016 10.7 3.6 - 11.0 K/uL Final   12/04/2015 13.6 (H) 3.6 - 11.0 K/uL Final       Blood Cultures Drawn:  yes    Initial Lactic Acid (LA):  Time 0525,  Result 2.9    Repeat LA:  Time Due 0925, Done & Result [Patient off floor when due]    Fluid Resuscitation:  Total needed 2613mL, Status completed, amount 2500mL    All Antibiotics Started:  yes, Dose Due [Patient off floor when Vancomycin due]    VS x 2 post-fluid resuscitation:   no ; [Patient off floor when vitals due]  Vasopressor Infusion:  no None    Provider Reassessment needed and notified:  yes , Dr. Castelan Inch evaluated patient prior to transport to [facility]    Additional Interventions/Comments:

## 2018-01-25 NOTE — PROGRESS NOTES
Pharmacy Automatic Renal Dosing Protocol - Antimicrobials/Vancomycin    Indication for Antimicrobials: Sepsis, left leg ulcer    Current Regimen of Each Antimicrobial:  Levaquin 750mg IV every 48hrs  (Start Date 18,  Day # 2 of therapy as patient received a 750 mg oral dose on 18 as an outpatient)  Vancomycin IV (Start Day ; day #1)  Oseltamivir 30 mg po x 1 dose, then 30mg po daily for CrCl 10-29ml/min    PTA Antibiotics:  Levofloxacin 750 mg every 18 hours x 3 days (Start Date: 18)    Goal Level: VANCOMYCIN TROUGH GOAL RANGE   15-20 mcg/ml-until blood cx results, and if negative can reduce trough level goal to 10-15 mcg/mL  Measured / Extrapolated Vancomycin Level: ---  / ---    Significant Cultures:   18 - Blood - pending  Flu negative    Paralysis, amputations, malnutrition: None    Labs:  Recent Labs      18   0525   CREA  1.68*   BUN  34*   WBC  12.1*     Temp (24hrs), Av.6 °F (39.2 °C), Min:100.6 °F (38.1 °C), Max:104.5 °F (40.3 °C)      Creatinine Clearance (mL/min) or Dialysis: ~ 22 ml/min  No results found for: PCT    Impression/Plan:    Patient looks to be in PRACHI (baseline Scr ~1.1 mg/dL)   Vancomycin initiated as a 2000 mg (~ 25 mg per kg) loading dose; maintenance dosing will be ordered \"per levels\" as renal function is currently unstable   Vancomycin concentration will not fall to ~15 mcg/mL until  @2200 with current renal function, so will hold off ordering a level as regimen should be re-evaluated in am and if renal function is either stable, or returns to normal, a scheduled maintenance dose can be placed at that time   Levofloxacin and oseltamivir dosing appropriate for renal function   Daily BMP ordered per protocol     Pharmacy will follow daily and adjust medications as appropriate for renal function and/or serum levels.     Thank you,  Diana Callahan, PharmD, [address] [facility] Pharmacy Specialist

## 2018-01-25 NOTE — ED NOTES
Bedside and Verbal shift change report given to Bhupendra Anderson RN (oncoming nurse) by Amalia Alcantar RN (offgoing nurse). Report included the following information SBAR, Kardex, ED Summary, MAR, Accordion and Recent Results.

## 2018-01-25 NOTE — ED PROVIDER NOTES
EMERGENCY DEPARTMENT HISTORY AND PHYSICAL EXAM      Date: 1/25/2018  Patient Name: Gerard Warren    History of Presenting Illness     Chief Complaint   Patient presents with    Shortness of Breath     shortness of breath since yesterday with generalized fatigue; unable to walk by self not baseline; 86% on room air    Fatigue     generalized weakness        History Provided By: Patient and EMS    HPI: Gerard Warren, 80 y.o. female with PMHx significant for COPD, HTN, and gallstones, presents via EMS to the ED with cc of SOB and generalized fatigue since yesterday. Per EMS, minor changes in mental status were reported by pt's daughter. Pt's daughter also reports an unusual change in pt's mobility earlier in the evening. Per EMS, pt's living facility did a flu swab which came back negative and CXR which had not resulted at the time of her transfer to ED. Pt was placed on abx for possible UTI. Per EMS, pt was 86% on room air, had fever of 101, heart rate was 106, and blood sugar was 156. Pt states that she uses O2 at home PRN. She denies using O2 yesterday or today. She denies using any breathing treatments today. Pt denies wheezing      PCP: Daina Murrieta MD    There are no other complaints, changes, or physical findings at this time.     Current Facility-Administered Medications   Medication Dose Route Frequency Provider Last Rate Last Dose    sodium chloride (NS) flush 5-10 mL  5-10 mL IntraVENous PRN Wan Allen MD        ibuprofen (MOTRIN) tablet 600 mg  600 mg Oral NOW Wan Allen MD   Stopped at 01/25/18 0624    levoFLOXacin (LEVAQUIN) 750 mg in D5W IVPB  750 mg IntraVENous NOW Wan Allen  mL/hr at 01/25/18 0656 750 mg at 01/25/18 0656    sodium chloride 0.9 % bolus infusion 500 mL  500 mL IntraVENous ONCE Wan Allen  mL/hr at 01/25/18 0734 500 mL at 01/25/18 0734     Current Outpatient Prescriptions   Medication Sig Dispense Refill    acetaminophen (TYLENOL) 325 mg tablet Take 650 mg by mouth every four (4) hours as needed for Pain.  atenolol (TENORMIN) 50 mg tablet TAKE 1 TABLET BY MOUTH AT BEDTIME 90 Tab 3    furosemide (LASIX) 40 mg tablet TAKE ONE TABLET BY MOUTH DAILY 30 Tab 0       Past History     Past Medical History:  Past Medical History:   Diagnosis Date    Arthritis     Chronic obstructive pulmonary disease (HCC)     Gastrointestinal disorder     gallstones    Hypertension     Iron deficiency     Leg ulcer (HCC)     Macular degeneration     OA (osteoarthritis) of shoulder     Spinal stenosis        Past Surgical History:  Past Surgical History:   Procedure Laterality Date    HX HEENT      cataract surgery with lens implants    HX ORTHOPAEDIC      right hip surgery       Family History:  History reviewed. No pertinent family history. Social History:  Social History   Substance Use Topics    Smoking status: Former Smoker     Quit date: 4/2/1991    Smokeless tobacco: None    Alcohol use Yes       Allergies: Allergies   Allergen Reactions    Latex Itching         Review of Systems   Review of Systems   Constitutional: Positive for fatigue. Negative for activity change and appetite change. HENT: Negative. Negative for congestion, rhinorrhea and sore throat. Respiratory: Positive for shortness of breath. Negative for wheezing. Cardiovascular: Negative. Negative for chest pain and leg swelling. Gastrointestinal: Negative. Negative for abdominal distention, abdominal pain, constipation, diarrhea, nausea and vomiting. Endocrine: Negative. Genitourinary: Negative for difficulty urinating, dysuria, menstrual problem, vaginal bleeding and vaginal discharge. Musculoskeletal: Positive for gait problem (changes in mobility). Negative for arthralgias, joint swelling and myalgias. Skin: Negative. Negative for rash. Neurological: Negative for dizziness, weakness, light-headedness and headaches.         Positive for slight changes in mental status. Psychiatric/Behavioral: Negative. Physical Exam   Physical Exam   Constitutional: She is oriented to person, place, and time. She appears well-developed and well-nourished. Febrile 104 rectal temp   HENT:   Head: Atraumatic. Eyes: EOM are normal.   Neck: Normal range of motion. Cardiovascular: Normal heart sounds and intact distal pulses. Tachycardia present. Exam reveals no gallop and no friction rub. No murmur heard. irreg,Tachycardia 100-110   Pulmonary/Chest: Breath sounds normal. Tachypnea noted. No respiratory distress. She has no wheezes. She has no rales. She exhibits no tenderness. 92% on 3 L. Speaking several words but not full sentences. Accessory muscle use. Abdominal: Soft. Bowel sounds are normal. She exhibits no distension and no mass. There is no tenderness. There is no rebound and no guarding. Musculoskeletal: Normal range of motion. She exhibits no edema or tenderness. Neurological: She is oriented to person, place, and time. Skin: Skin is warm. No rash noted. 4 cm circular wound to back of left distal calf with packing gauze in place. Diffuse erythema to anterior aspect of LLE. No warmth. Psychiatric: She has a normal mood and affect. Nursing note and vitals reviewed.         Diagnostic Study Results     Labs -     Recent Results (from the past 12 hour(s))   EKG, 12 LEAD, INITIAL    Collection Time: 01/25/18  5:05 AM   Result Value Ref Range    Ventricular Rate 101 BPM    Atrial Rate 105 BPM    QRS Duration 80 ms    Q-T Interval 336 ms    QTC Calculation (Bezet) 435 ms    Calculated R Axis 63 degrees    Calculated T Axis 57 degrees    Diagnosis       ** Poor data quality, interpretation may be adversely affected  Atrial fibrillation with rapid ventricular response  Possible Anterior infarct , age undetermined  No previous ECGs available     CULTURE, BLOOD    Collection Time: 01/25/18  5:25 AM   Result Value Ref Range    Special Requests: NO SPECIAL REQUESTS      Culture result: NO GROWTH AFTER 2 HOURS     LACTIC ACID    Collection Time: 01/25/18  5:25 AM   Result Value Ref Range    Lactic acid 2.9 (HH) 0.4 - 2.0 MMOL/L   CULTURE, BLOOD    Collection Time: 01/25/18  5:25 AM   Result Value Ref Range    Special Requests: NO SPECIAL REQUESTS      Culture result: NO GROWTH AFTER 1 HOUR     METABOLIC PANEL, COMPREHENSIVE    Collection Time: 01/25/18  5:25 AM   Result Value Ref Range    Sodium 132 (L) 136 - 145 mmol/L    Potassium 3.2 (L) 3.5 - 5.1 mmol/L    Chloride 95 (L) 97 - 108 mmol/L    CO2 25 21 - 32 mmol/L    Anion gap 12 5 - 15 mmol/L    Glucose 178 (H) 65 - 100 mg/dL    BUN 34 (H) 6 - 20 MG/DL    Creatinine 1.68 (H) 0.55 - 1.02 MG/DL    BUN/Creatinine ratio 20 12 - 20      GFR est AA 35 (L) >60 ml/min/1.73m2    GFR est non-AA 28 (L) >60 ml/min/1.73m2    Calcium 8.6 8.5 - 10.1 MG/DL    Bilirubin, total 1.9 (H) 0.2 - 1.0 MG/DL    ALT (SGPT) 19 12 - 78 U/L    AST (SGOT) 45 (H) 15 - 37 U/L    Alk. phosphatase 72 45 - 117 U/L    Protein, total 7.6 6.4 - 8.2 g/dL    Albumin 3.0 (L) 3.5 - 5.0 g/dL    Globulin 4.6 (H) 2.0 - 4.0 g/dL    A-G Ratio 0.7 (L) 1.1 - 2.2     CBC WITH AUTOMATED DIFF    Collection Time: 01/25/18  5:25 AM   Result Value Ref Range    WBC 12.1 (H) 3.6 - 11.0 K/uL    RBC 4.53 3.80 - 5.20 M/uL    HGB 13.6 11.5 - 16.0 g/dL    HCT 39.9 35.0 - 47.0 %    MCV 88.1 80.0 - 99.0 FL    MCH 30.0 26.0 - 34.0 PG    MCHC 34.1 30.0 - 36.5 g/dL    RDW 15.3 (H) 11.5 - 14.5 %    PLATELET 212 (L) 325 - 400 K/uL    NEUTROPHILS 79 (H) 32 - 75 %    LYMPHOCYTES 7 (L) 12 - 49 %    MONOCYTES 14 (H) 5 - 13 %    EOSINOPHILS 0 0 - 7 %    BASOPHILS 0 0 - 1 %    ABS. NEUTROPHILS 9.5 (H) 1.8 - 8.0 K/UL    ABS. LYMPHOCYTES 0.9 0.8 - 3.5 K/UL    ABS. MONOCYTES 1.7 (H) 0.0 - 1.0 K/UL    ABS. EOSINOPHILS 0.0 0.0 - 0.4 K/UL    ABS.  BASOPHILS 0.0 0.0 - 0.1 K/UL   INFLUENZA A & B AG (RAPID TEST)    Collection Time: 01/25/18  5:58 AM   Result Value Ref Range    Influenza A Antigen NEGATIVE  NEG      Influenza B Antigen NEGATIVE  NEG     BLOOD GAS, ARTERIAL    Collection Time: 01/25/18  7:24 AM   Result Value Ref Range    pH 7.49 (H) 7.35 - 7.45      PCO2 32 (L) 35.0 - 45.0 mmHg    PO2 98 80 - 100 mmHg    O2 SAT 98 (H) 92 - 97 %    BICARBONATE 24 22 - 26 mmol/L    BASE EXCESS 1.2 mmol/L    O2 METHOD NASAL O2      O2 FLOW RATE 2.50 L/min    SPONTANEOUS RATE 26.0      Sample source ARTERIAL      SITE LEFT RADIAL      JAMESON'S TEST YES         Radiologic Studies -   CXR Results  (Last 48 hours)               01/25/18 0547  XR CHEST PORT Final result    Impression:  IMPRESSION: Suspect COPD. Cardiomegaly and chronic changes as above. No acute   findings. Narrative:  EXAM:  XR CHEST PORT       INDICATION:  Sepsis       COMPARISON:  None. FINDINGS: A portable AP radiograph of the chest was obtained at 05:27 hours. The   patient is on a cardiac monitor. The lungs appear hyperinflated but otherwise   clear. The cardiac silhouette is enlarged. Knee is not contours are otherwise   unremarkable. Atherosclerotic calcifications affect the aortic arch and the   thoracic aorta is tortuous. The chest wall structures and visualized upper   abdomen show no acute findings with incidental note of degenerative spine and   shoulder changes as well as diffuse osteopenia. Medical Decision Making   I am the first provider for this patient. I reviewed the vital signs, available nursing notes, past medical history, past surgical history, family history and social history. Vital Signs-Reviewed the patient's vital signs.   Patient Vitals for the past 12 hrs:   Temp Pulse Resp BP SpO2   01/25/18 0745 - 76 23 (!) 103/39 94 %   01/25/18 0730 - 80 19 90/75 94 %   01/25/18 0715 - 78 17 (!) 105/38 94 %   01/25/18 0700 - 77 22 - 95 %   01/25/18 0655 (!) 100.6 °F (38.1 °C) - - - -   01/25/18 0645 - 82 24 (!) 99/31 94 %   01/25/18 0630 - 82 20 (!) 106/33 95 %   01/25/18 0628 - 78 15 100/40 95 %   01/25/18 0502 (!) 104.5 °F (40.3 °C) (!) 103 26 147/81 95 %       EKG interpretation: (Preliminary)  Rhythm: atrial fib;. Rate (approx.): 96; Axis: left axis deviation; ME interval indeterminant: normal; QRS interval: normal ; ST/T wave: normal; Other findings: borderline ekg. RBBB  Written by Gisella Gutierrez md    Records Reviewed: Old Medical Records    Provider Notes (Medical Decision Making):   Acute respiratory failure with hypoxia, CAP, Influenza, Sepsis, fever, dysrhythmia, dehydration, uti    ED Course:   Initial assessment performed. The patients presenting problems have been discussed, and they are in agreement with the care plan formulated and outlined with them. I have encouraged them to ask questions as they arise throughout their visit. 5:13 AM - I suspect that this patient has an active infection. 5:13 AM - The patient met criteria for severe sepsis at this time. PROVIDER SEPSIS PHYSICAL EXAM EVAL  Vital signs reviewed (see nursing documentation for further details):  Vitals:    01/25/18 0700 01/25/18 0715 01/25/18 0730 01/25/18 0745   BP:  (!) 105/38 90/75 (!) 103/39   Pulse: 77 78 80 76   Resp: 22 17 19 23   Temp:       SpO2: 95% 94% 94% 94%       Cardiac exam:Tachycardic    Pulmonary exam:Increased work of breathing    Peripheral pulses:Normal    Capillary refill:Normal    Skin exam:pink    Exam performed Steven Beltran MD    SEP-1 Core Measure Exclusion Criteria  Pt No exclusion criteria   Has the patient/family refused IV fluids? no     Progress Note:  6:23 AM  Will treat empirically for the flu.     6:55 AM  Temperature down from 104 to 100.6    7:00 AM  Confirmed that sepsis protocol was ordered. Consult Note:  7:55 AM  Gorge Tenorio MD spoke with Jaimie Ortiz MD  Specialty: Hospitalist  Discussed pt's hx, disposition, and available diagnostic and imaging results. Reviewed care plans. Consultant agrees with plans as outlined. Dr. Tracee Guerra will admit pt.        Critical Care Time: CRITICAL CARE NOTE :    5:13 AM      IMPENDING DETERIORATION -Respiratory and Cardiovascular    ASSOCIATED RISK FACTORS - Hypoxia and Dysrhythmia    MANAGEMENT- Bedside Assessment and Supervision of Care    INTERPRETATION -  Xrays, ECG and Blood Pressure    INTERVENTIONS - hemodynamic mngmt and Metobolic interventions    CASE REVIEW - Hospitalist and Nursing    TREATMENT RESPONSE -Stable    PERFORMED BY - Self      NOTES   :      I have spent >100 minutes of critical care time involved in lab review, consultations with specialist, family decision- making, bedside attention and documentation. During this entire length of time I was immediately available to the patient . Karen Avila MD    Disposition:  Admit Note:  7:59 AM  Pt is being admitted by Alexis Kyle MD. The results of their tests and reason(s) for their admission have been discussed with pt and/or available family. They convey agreement and understanding for the need to be admitted and for admission diagnosis. Diagnosis     Clinical Impression:   1. Community acquired pneumonia, unspecified laterality    2. Acute respiratory failure with hypoxia ([address]        Attestations: This note is prepared by Maren Carty, acting as a Scribe for MD Karen Nicholson MD: The scribe's documentation has been prepared under my direction and personally reviewed by me in its entirety. I confirm that the notes above accurately reflects all work, treatment, procedures, and medical decision making performed by me.

## 2018-01-25 NOTE — ED TRIAGE NOTES
Chief Complaint: shortness of breath   Patient states was 86% on room air for EMS arrival.  Complaints of shortness of breath generalized weakness and not herself  Onset yesterday.   From [facility]  Pt arrived via EMS

## 2018-01-25 NOTE — PROGRESS NOTES
Pharmacy Clarification of the Prior to Admission Medication Regimen Retrospective to the Admission Medication Reconciliation    Patient was transferred from [facility], to [facility]. Pharmacy called [facility], [phone], and was told the patient lives in the independent living quarters of the facility. MHT then called the patient's outpatient pharmacy, [address] [place] [address], [phone], and spoke with Honey Carolina, who was able to provide the patient's current medications. MHT went to interview the patient to obtain last doses administered, and the patient was off the floor for testing. The patient was interviewed regarding clarification of the prior to admission medication regimen. Daughter and niece were present in room and obtained permission from patient to discuss drug regimen with visitor(s) present. Patient was questioned regarding use of any other inhalers, topical products, over the counter medications, herbal medications, vitamin products or ophthalmic/nasal/otic medication use. Information Obtained From: Outpatient pharmacy, Patient    Recommendations/Findings: The following amendments were made to the patient's active medication list on file at [facility]:     1) Additions:    levoFLOXacin (LEVAQUIN) 750 mg tablet    2) Removals: NONE    3) Changes:   acetaminophen (TYLENOL) (Old regimen: (strength 325 mg) 650 mg Q4H PRN /New regimen: (strength 500 mg) 1000 mg QHS)    4) Pertinent Pharmacy Findings:   levoFLOXacin (LEVAQUIN) 750 mg tablet: Patient started a 3 dose regimen on 1/24/18. Patient has completed 1 dose of therapy as of 1/24/18. PTA medication list was corrected to the following:     Prior to Admission Medications   Prescriptions Last Dose Informant Patient Reported? Taking?   acetaminophen (TYLENOL) 500 mg tablet 1/24/2018 at Unknown time Self Yes Yes   Sig: Take 1,000 mg by mouth nightly.    atenolol (TENORMIN) 50 mg tablet 1/24/2018 at Unknown time Other No Yes Sig: TAKE 1 TABLET BY MOUTH AT BEDTIME   furosemide (LASIX) 40 mg tablet 1/22/2018 at Unknown time Other No Yes   Sig: TAKE ONE TABLET BY MOUTH DAILY   levoFLOXacin (LEVAQUIN) 750 mg tablet 1/24/2018 at 1600 Other Yes Yes   Sig: Take 750 mg by mouth every eighteen (18) hours.  For 3 doses      Facility-Administered Medications: None          Thank you,  Kaley Mcdonald CPhT  Medication History Pharmacy Technician

## 2018-01-25 NOTE — PROGRESS NOTES
Primary Nurse Bhavik Combs RN and Naif Rico RN performed a dual skin assessment on this patient Impairment noted- see wound doc flow sheet  Neel score is 15    Wound to posterior left calf. Excoriation to groin and abd fold  Red sacrum but blanchable. Left foot 2nd and 3rd toes has a marroon spot to top of toes.

## 2018-01-25 NOTE — PROGRESS NOTES
TRANSFER - IN REPORT:    Verbal report received from Negrito Morleyvipin Giorgi Juanita (name) on Edra Laverne  being received from ED (unit) for routine progression of care      Report consisted of patients Situation, Background, Assessment and   Recommendations(SBAR). Information from the following report(s) SBAR, Kardex, ED Summary, Recent Results, Med Rec Status and Cardiac Rhythm afib was reviewed with the receiving nurse. Opportunity for questions and clarification was provided. Assessment completed upon patients arrival to unit and care assumed. 1345: Pt placed on droplet precautions according to hospital policy even though flu negative. 1800: Pt up x2 max assist to bedside commode. Struggling to follow commends (ie: move right leg forward) to mobilize. Back to bed x3 assist. Unsafe to ambulate with staff at this time.  Educated pt on fall safety precautions

## 2018-01-25 NOTE — ED NOTES
Called [facility], spoke to [place], patient is no longer at at [facility], she is currently in [address] [facility].

## 2018-01-25 NOTE — ED NOTES
Patient's labia and inguinal folds are reddened and excoriated. Will notify Dr. Charles Nava that patient has possible yeast infection.

## 2018-01-25 NOTE — ED NOTES
Pt presents to ED from an assisted living facility c/o generalized weakness,  not feeling like herself, and SOB.

## 2018-01-25 NOTE — IP AVS SNAPSHOT
[address] [facility] 
[phone] Patient: Gwendolyn Castañeda MRN: [id number] PSJ:[id number] A check keenan indicates which time of day the medication should be taken. My Medications START taking these medications Instructions Each Dose to Equal  
 Morning Noon Evening Bedtime  
 oseltamivir 30 mg capsule Commonly known as:  TAMIFLU Your last dose was: Your next dose is: Take 1 Cap by mouth two (2) times a day for 2 days. 30 mg CHANGE how you take these medications Instructions Each Dose to Equal  
 Morning Noon Evening Bedtime  
 acetaminophen 500 mg tablet Commonly known as:  TYLENOL What changed:  Another medication with the same name was removed. Continue taking this medication, and follow the directions you see here. Your last dose was: Your next dose is: Take 1,000 mg by mouth nightly. 1000 mg CONTINUE taking these medications Instructions Each Dose to Equal  
 Morning Noon Evening Bedtime  
 atenolol 50 mg tablet Commonly known as:  TENORMIN Your last dose was: Your next dose is: TAKE 1 TABLET BY MOUTH AT BEDTIME  
     
   
   
   
  
 furosemide 40 mg tablet Commonly known as:  LASIX Your last dose was: Your next dose is: TAKE ONE TABLET BY MOUTH DAILY  
     
   
   
   
  
  
STOP taking these medications   
 levoFLOXacin 750 mg tablet Commonly known as:  Gauri Anguiano Where to Get Your Medications Information on where to get these meds will be given to you by the nurse or doctor. ! Ask your nurse or doctor about these medications  
  oseltamivir 30 mg capsule

## 2018-01-26 ENCOUNTER — APPOINTMENT (OUTPATIENT)
Dept: GENERAL RADIOLOGY | Age: 83
DRG: 871 | End: 2018-01-26
Attending: INTERNAL MEDICINE
Payer: MEDICARE

## 2018-01-26 PROBLEM — B34.9 VIRAL SYNDROME: Status: ACTIVE | Noted: 2018-01-26

## 2018-01-26 LAB
ALBUMIN SERPL-MCNC: 2.7 G/DL (ref 3.5–5)
ALBUMIN SERPL-MCNC: 2.7 G/DL (ref 3.5–5)
ALBUMIN/GLOB SERPL: 0.6 {RATIO} (ref 1.1–2.2)
ALBUMIN/GLOB SERPL: 0.6 {RATIO} (ref 1.1–2.2)
ALP SERPL-CCNC: 73 U/L (ref 45–117)
ALP SERPL-CCNC: 86 U/L (ref 45–117)
ALT SERPL-CCNC: 26 U/L (ref 12–78)
ALT SERPL-CCNC: 28 U/L (ref 12–78)
ANION GAP SERPL CALC-SCNC: 11 MMOL/L (ref 5–15)
ANION GAP SERPL CALC-SCNC: 9 MMOL/L (ref 5–15)
AST SERPL-CCNC: 47 U/L (ref 15–37)
AST SERPL-CCNC: 53 U/L (ref 15–37)
ATRIAL RATE: 93 BPM
BASOPHILS # BLD: 0 K/UL (ref 0–0.1)
BASOPHILS NFR BLD: 0 % (ref 0–1)
BILIRUB SERPL-MCNC: 1.1 MG/DL (ref 0.2–1)
BILIRUB SERPL-MCNC: 1.2 MG/DL (ref 0.2–1)
BUN SERPL-MCNC: 35 MG/DL (ref 6–20)
BUN SERPL-MCNC: 38 MG/DL (ref 6–20)
BUN/CREAT SERPL: 29 (ref 12–20)
BUN/CREAT SERPL: 36 (ref 12–20)
CALCIUM SERPL-MCNC: 7.9 MG/DL (ref 8.5–10.1)
CALCIUM SERPL-MCNC: 8.1 MG/DL (ref 8.5–10.1)
CALCULATED R AXIS, ECG10: 113 DEGREES
CALCULATED T AXIS, ECG11: 156 DEGREES
CHLORIDE SERPL-SCNC: 105 MMOL/L (ref 97–108)
CHLORIDE SERPL-SCNC: 109 MMOL/L (ref 97–108)
CHOLEST SERPL-MCNC: 125 MG/DL
CK MB CFR SERPL CALC: 3 % (ref 0–2.5)
CK MB SERPL-MCNC: 9.1 NG/ML (ref 5–25)
CK SERPL-CCNC: 300 U/L (ref 26–192)
CO2 SERPL-SCNC: 20 MMOL/L (ref 21–32)
CO2 SERPL-SCNC: 22 MMOL/L (ref 21–32)
CREAT SERPL-MCNC: 1.06 MG/DL (ref 0.55–1.02)
CREAT SERPL-MCNC: 1.19 MG/DL (ref 0.55–1.02)
DIAGNOSIS, 93000: NORMAL
DIFFERENTIAL METHOD BLD: ABNORMAL
EOSINOPHIL # BLD: 0 K/UL (ref 0–0.4)
EOSINOPHIL NFR BLD: 0 % (ref 0–7)
ERYTHROCYTE [DISTWIDTH] IN BLOOD BY AUTOMATED COUNT: 15.3 % (ref 11.5–14.5)
ERYTHROCYTE [DISTWIDTH] IN BLOOD BY AUTOMATED COUNT: 15.6 % (ref 11.5–14.5)
GLOBULIN SER CALC-MCNC: 4.4 G/DL (ref 2–4)
GLOBULIN SER CALC-MCNC: 4.6 G/DL (ref 2–4)
GLUCOSE BLD STRIP.AUTO-MCNC: 101 MG/DL (ref 65–100)
GLUCOSE BLD STRIP.AUTO-MCNC: 102 MG/DL (ref 65–100)
GLUCOSE BLD STRIP.AUTO-MCNC: 109 MG/DL (ref 65–100)
GLUCOSE BLD STRIP.AUTO-MCNC: 140 MG/DL (ref 65–100)
GLUCOSE SERPL-MCNC: 101 MG/DL (ref 65–100)
GLUCOSE SERPL-MCNC: 111 MG/DL (ref 65–100)
HCT VFR BLD AUTO: 41 % (ref 35–47)
HCT VFR BLD AUTO: 42.4 % (ref 35–47)
HDLC SERPL-MCNC: 24 MG/DL
HDLC SERPL: 5.2 {RATIO} (ref 0–5)
HGB BLD-MCNC: 13.1 G/DL (ref 11.5–16)
HGB BLD-MCNC: 13.5 G/DL (ref 11.5–16)
IMM GRANULOCYTES # BLD: 0 K/UL (ref 0–0.04)
IMM GRANULOCYTES NFR BLD AUTO: 0 % (ref 0–0.5)
LDLC SERPL CALC-MCNC: 80.2 MG/DL (ref 0–100)
LIPID PROFILE,FLP: ABNORMAL
LYMPHOCYTES # BLD: 1.2 K/UL (ref 0.8–3.5)
LYMPHOCYTES NFR BLD: 13 % (ref 12–49)
MAGNESIUM SERPL-MCNC: 2 MG/DL (ref 1.6–2.4)
MCH RBC QN AUTO: 28.7 PG (ref 26–34)
MCH RBC QN AUTO: 29.2 PG (ref 26–34)
MCHC RBC AUTO-ENTMCNC: 31.8 G/DL (ref 30–36.5)
MCHC RBC AUTO-ENTMCNC: 32 G/DL (ref 30–36.5)
MCV RBC AUTO: 89.9 FL (ref 80–99)
MCV RBC AUTO: 91.8 FL (ref 80–99)
MONOCYTES # BLD: 0.9 K/UL (ref 0–1)
MONOCYTES NFR BLD: 10 % (ref 5–13)
NEUTS SEG # BLD: 7 K/UL (ref 1.8–8)
NEUTS SEG NFR BLD: 77 % (ref 32–75)
NRBC # BLD: 0 K/UL (ref 0–0.01)
NRBC # BLD: 0 K/UL (ref 0–0.01)
NRBC BLD-RTO: 0 PER 100 WBC
NRBC BLD-RTO: 0 PER 100 WBC
PHOSPHATE SERPL-MCNC: 3.5 MG/DL (ref 2.6–4.7)
PLATELET # BLD AUTO: 147 K/UL (ref 150–400)
PLATELET # BLD AUTO: 163 K/UL (ref 150–400)
PMV BLD AUTO: 11 FL (ref 8.9–12.9)
PMV BLD AUTO: 11.1 FL (ref 8.9–12.9)
POTASSIUM SERPL-SCNC: 3.6 MMOL/L (ref 3.5–5.1)
POTASSIUM SERPL-SCNC: 3.9 MMOL/L (ref 3.5–5.1)
PROT SERPL-MCNC: 7.1 G/DL (ref 6.4–8.2)
PROT SERPL-MCNC: 7.3 G/DL (ref 6.4–8.2)
Q-T INTERVAL, ECG07: 386 MS
QRS DURATION, ECG06: 84 MS
QTC CALCULATION (BEZET), ECG08: 456 MS
RBC # BLD AUTO: 4.56 M/UL (ref 3.8–5.2)
RBC # BLD AUTO: 4.62 M/UL (ref 3.8–5.2)
SERVICE CMNT-IMP: ABNORMAL
SODIUM SERPL-SCNC: 136 MMOL/L (ref 136–145)
SODIUM SERPL-SCNC: 140 MMOL/L (ref 136–145)
TRIGL SERPL-MCNC: 104 MG/DL (ref ?–150)
TROPONIN I SERPL-MCNC: 0.31 NG/ML
TROPONIN I SERPL-MCNC: 0.37 NG/ML
VANCOMYCIN SERPL-MCNC: 11.6 UG/ML
VENTRICULAR RATE, ECG03: 84 BPM
VLDLC SERPL CALC-MCNC: 20.8 MG/DL
WBC # BLD AUTO: 12.4 K/UL (ref 3.6–11)
WBC # BLD AUTO: 9.1 K/UL (ref 3.6–11)

## 2018-01-26 PROCEDURE — 77030009526 HC GEL CARSYN MDII -A

## 2018-01-26 PROCEDURE — 77010033678 HC OXYGEN DAILY

## 2018-01-26 PROCEDURE — 85027 COMPLETE CBC AUTOMATED: CPT | Performed by: INTERNAL MEDICINE

## 2018-01-26 PROCEDURE — 82962 GLUCOSE BLOOD TEST: CPT

## 2018-01-26 PROCEDURE — 77030011255 HC DSG AQUACEL AG BMS -A

## 2018-01-26 PROCEDURE — 80053 COMPREHEN METABOLIC PANEL: CPT | Performed by: INTERNAL MEDICINE

## 2018-01-26 PROCEDURE — 77030019607 HC DSG BURN S&N -A

## 2018-01-26 PROCEDURE — 74011250636 HC RX REV CODE- 250/636: Performed by: INTERNAL MEDICINE

## 2018-01-26 PROCEDURE — 85025 COMPLETE CBC W/AUTO DIFF WBC: CPT | Performed by: HOSPITALIST

## 2018-01-26 PROCEDURE — 80061 LIPID PANEL: CPT | Performed by: HOSPITALIST

## 2018-01-26 PROCEDURE — 74011000250 HC RX REV CODE- 250: Performed by: INTERNAL MEDICINE

## 2018-01-26 PROCEDURE — 71045 X-RAY EXAM CHEST 1 VIEW: CPT

## 2018-01-26 PROCEDURE — 97116 GAIT TRAINING THERAPY: CPT

## 2018-01-26 PROCEDURE — 84484 ASSAY OF TROPONIN QUANT: CPT | Performed by: HOSPITALIST

## 2018-01-26 PROCEDURE — 83735 ASSAY OF MAGNESIUM: CPT | Performed by: HOSPITALIST

## 2018-01-26 PROCEDURE — 65660000000 HC RM CCU STEPDOWN

## 2018-01-26 PROCEDURE — 84100 ASSAY OF PHOSPHORUS: CPT | Performed by: HOSPITALIST

## 2018-01-26 PROCEDURE — 36415 COLL VENOUS BLD VENIPUNCTURE: CPT | Performed by: HOSPITALIST

## 2018-01-26 PROCEDURE — 93005 ELECTROCARDIOGRAM TRACING: CPT

## 2018-01-26 PROCEDURE — 77030029684 HC NEB SM VOL KT MONA -A

## 2018-01-26 PROCEDURE — G8979 MOBILITY GOAL STATUS: HCPCS

## 2018-01-26 PROCEDURE — 80202 ASSAY OF VANCOMYCIN: CPT | Performed by: HOSPITALIST

## 2018-01-26 PROCEDURE — 80053 COMPREHEN METABOLIC PANEL: CPT | Performed by: HOSPITALIST

## 2018-01-26 PROCEDURE — 97530 THERAPEUTIC ACTIVITIES: CPT

## 2018-01-26 PROCEDURE — 93308 TTE F-UP OR LMTD: CPT

## 2018-01-26 PROCEDURE — 97161 PT EVAL LOW COMPLEX 20 MIN: CPT

## 2018-01-26 PROCEDURE — 82550 ASSAY OF CK (CPK): CPT | Performed by: HOSPITALIST

## 2018-01-26 PROCEDURE — 74011250636 HC RX REV CODE- 250/636: Performed by: HOSPITALIST

## 2018-01-26 PROCEDURE — 74011250637 HC RX REV CODE- 250/637: Performed by: HOSPITALIST

## 2018-01-26 PROCEDURE — 74011250637 HC RX REV CODE- 250/637: Performed by: INTERNAL MEDICINE

## 2018-01-26 PROCEDURE — G8978 MOBILITY CURRENT STATUS: HCPCS

## 2018-01-26 RX ORDER — LEVOFLOXACIN 5 MG/ML
750 INJECTION, SOLUTION INTRAVENOUS EVERY 24 HOURS
Status: DISCONTINUED | OUTPATIENT
Start: 2018-01-26 | End: 2018-01-26

## 2018-01-26 RX ORDER — IPRATROPIUM BROMIDE AND ALBUTEROL SULFATE 2.5; .5 MG/3ML; MG/3ML
3 SOLUTION RESPIRATORY (INHALATION)
Status: COMPLETED | OUTPATIENT
Start: 2018-01-26 | End: 2018-01-26

## 2018-01-26 RX ORDER — LEVOFLOXACIN 5 MG/ML
750 INJECTION, SOLUTION INTRAVENOUS
Status: DISCONTINUED | OUTPATIENT
Start: 2018-01-28 | End: 2018-01-27

## 2018-01-26 RX ORDER — VANCOMYCIN HYDROCHLORIDE
1250 EVERY 24 HOURS
Status: DISCONTINUED | OUTPATIENT
Start: 2018-01-27 | End: 2018-01-27

## 2018-01-26 RX ORDER — HALOPERIDOL 5 MG/ML
2 INJECTION INTRAMUSCULAR ONCE
Status: COMPLETED | OUTPATIENT
Start: 2018-01-26 | End: 2018-01-26

## 2018-01-26 RX ORDER — HALOPERIDOL 5 MG/ML
3 INJECTION INTRAMUSCULAR ONCE
Status: COMPLETED | OUTPATIENT
Start: 2018-01-26 | End: 2018-01-26

## 2018-01-26 RX ORDER — OSELTAMIVIR PHOSPHATE 30 MG/1
30 CAPSULE ORAL 2 TIMES DAILY
Status: DISCONTINUED | OUTPATIENT
Start: 2018-01-26 | End: 2018-01-29 | Stop reason: HOSPADM

## 2018-01-26 RX ORDER — LEVOFLOXACIN 5 MG/ML
750 INJECTION, SOLUTION INTRAVENOUS
Status: DISCONTINUED | OUTPATIENT
Start: 2018-01-27 | End: 2018-01-26

## 2018-01-26 RX ORDER — FUROSEMIDE 10 MG/ML
20 INJECTION INTRAMUSCULAR; INTRAVENOUS ONCE
Status: COMPLETED | OUTPATIENT
Start: 2018-01-26 | End: 2018-01-26

## 2018-01-26 RX ORDER — VANCOMYCIN 1.75 GRAM/500 ML IN 0.9 % SODIUM CHLORIDE INTRAVENOUS
1750 ONCE
Status: COMPLETED | OUTPATIENT
Start: 2018-01-26 | End: 2018-01-26

## 2018-01-26 RX ADMIN — NYSTATIN: 100000 POWDER TOPICAL at 17:33

## 2018-01-26 RX ADMIN — HEPARIN SODIUM 5000 UNITS: 5000 INJECTION, SOLUTION INTRAVENOUS; SUBCUTANEOUS at 00:59

## 2018-01-26 RX ADMIN — DOCUSATE SODIUM 100 MG: 100 CAPSULE, LIQUID FILLED ORAL at 17:32

## 2018-01-26 RX ADMIN — VANCOMYCIN HYDROCHLORIDE 1750 MG: 10 INJECTION, POWDER, LYOPHILIZED, FOR SOLUTION INTRAVENOUS at 17:31

## 2018-01-26 RX ADMIN — HEPARIN SODIUM 5000 UNITS: 5000 INJECTION, SOLUTION INTRAVENOUS; SUBCUTANEOUS at 09:02

## 2018-01-26 RX ADMIN — OSELTAMIVIR PHOSPHATE 30 MG: 30 CAPSULE ORAL at 09:09

## 2018-01-26 RX ADMIN — ACETAMINOPHEN 650 MG: 325 TABLET, FILM COATED ORAL at 22:59

## 2018-01-26 RX ADMIN — NYSTATIN: 100000 POWDER TOPICAL at 09:05

## 2018-01-26 RX ADMIN — Medication 10 ML: at 22:03

## 2018-01-26 RX ADMIN — DOCUSATE SODIUM 100 MG: 100 CAPSULE, LIQUID FILLED ORAL at 09:04

## 2018-01-26 RX ADMIN — HALOPERIDOL LACTATE 3 MG: 5 INJECTION, SOLUTION INTRAMUSCULAR at 21:00

## 2018-01-26 RX ADMIN — IPRATROPIUM BROMIDE AND ALBUTEROL SULFATE 3 ML: .5; 3 SOLUTION RESPIRATORY (INHALATION) at 21:05

## 2018-01-26 RX ADMIN — Medication 10 ML: at 03:14

## 2018-01-26 RX ADMIN — FUROSEMIDE 20 MG: 10 INJECTION, SOLUTION INTRAMUSCULAR; INTRAVENOUS at 22:23

## 2018-01-26 RX ADMIN — LEVOFLOXACIN 750 MG: 5 INJECTION, SOLUTION INTRAVENOUS at 15:47

## 2018-01-26 RX ADMIN — OSELTAMIVIR PHOSPHATE 30 MG: 30 CAPSULE ORAL at 17:55

## 2018-01-26 RX ADMIN — HALOPERIDOL LACTATE 2 MG: 5 INJECTION, SOLUTION INTRAMUSCULAR at 22:23

## 2018-01-26 RX ADMIN — ASPIRIN 81 MG: 81 TABLET, COATED ORAL at 09:04

## 2018-01-26 RX ADMIN — HEPARIN SODIUM 5000 UNITS: 5000 INJECTION, SOLUTION INTRAVENOUS; SUBCUTANEOUS at 17:31

## 2018-01-26 NOTE — PROGRESS NOTES
Pharmacy Automatic Renal Dosing Protocol - Antimicrobials/Vancomycin  Indication for Antimicrobials: Sepsis, left leg ulcer    Current Regimen of Each Antimicrobial:  Vancomycin IV 2000 mg IV yesterday, (Start Day ; day #2)  Oseltamivir 30 mg po x 1 dose, then 30mg po daily for CrCl 10-29ml/min    Previous antimicrobials:  Levaquin 750mg IV every 48hrs  (DCD , Start Date 18,  Day # 2 of therapy as patient received a 750 mg oral dose on 18 as an outpatient)    PTA Antibiotics:  Levofloxacin 750 mg every 18 hours x 3 days (Start Date: 18)    Goal Level: VANCOMYCIN TROUGH GOAL RANGE   15-20 mcg/ml-until blood cx results, and if negative can reduce trough level goal to 10-15 mcg/mL  Measured / Extrapolated Vancomycin Level: ---  / ---    Significant Cultures:   18 - Blood - pending  Flu negative    Paralysis, amputations, malnutrition: None    Labs:  Recent Labs      18   0525   CREA  1.68*   BUN  34*   WBC  12.1*     Temp (24hrs), Av.6 °F (39.2 °C), Min:100.6 °F (38.1 °C), Max:104.5 °F (40.3 °C)    Creatinine Clearance (mL/min) or Dialysis: ~ 46 ml/min  No results found for: PCT    Impression/Plan:    Back to  basline of ~ 1   Will change Oseltamvir to 30 mg po Bid for Improved Crcl 30-60ml/min   Will change Levaquin to q 24hr dosing for improved renal function CrCl 20-49ml/min   Vancomycin random level this morning = 11.6; will give a dose of 1750mg which will raise her to a peak of ~ 40 mcg/ml and will start 1250mg IV every 24hrs for a trough value of 15-20 mcg/ml and an AUC of 572   Daily BMP ordered per protocol     Pharmacy will follow daily and adjust medications as appropriate for renal function and/or serum levels.     Thank you, Sohail Orta, [facility]

## 2018-01-26 NOTE — PROGRESS NOTES
Hospitalist Progress Note    NAME: Nelson Patton   :  1925   MRN:  [id number]       Assessment / Plan:  Severe sepsis, POA   Likely due to Viral syndrome + Possible UTI POA  Acute hypoxic respiratory failure  86% RA, 95% 3L. Suspect due to sepsis  COPD, not in acute exacerbation  CXR neg  V/Q scan neg PE  Flu test neg  UA +ve  Urine Cx pending    Cont empiric abx with Levaquin to cover pulm/ and vancomycin for potential soft tissue infection for now- de-escalate in next 24 hrs  Continue with tamiflu empirically for now- pt high risk  Cont gentle IVF now  Keep holding atenolol and lasix for now  PT/OT consult for DC planning- may need to go to health care section of [facility] on DC fall precaution. PRACHI POA- resolved  Due to Dehydration POA- resolved  Cr 1.68-> 1.1    Decrease IVF to 75ml/hr today - DC in 24 hrs once eating & drinking enough & no high fevers  BMP in AM    Elevated trop 0.58 POA- now down to 0.3--  ekg looks like afib but exam seems more c/w with SR. V/Q scan low probability for PE. Troponin leak could be from sepsis or due to [facility]. Echo normal EF, no WMA    Cont aspirin 81mg daily,   Can resume atenolol soon    Hypovolemic hyponatremia Na 132 POA- now resolved  Hypokalemia 3.2 POA- resolved  Observe now    Generalized weakness due to severe sepsis, POA  Chronic left posterior calf ulcer since 2017. Wound does not appear infected  PAD involving lower legs and carotid artery per daughter  Cholelithiasis POA  Hx afib post hip ORIF . Not on anticoagulation    wound care consulted for chronic L post calf ulcer/wound      Code:  Discussed with patient and daughter Krystyna Wynn in room. No advance directive. Does not want heroic measures.   DNR/DNI  DVT prophylaxis: heparin  Surrogate decision maker:  Silvia Louis cell [phone]    Recommended Disposition: SNF/LTC soon 2-3 days     Subjective:     Chief Complaint / Reason for Physician Visit :F/U Fever, sepsis, Viral syndrome, chronic wounds, PRACHI  \"I feel much better\". Discussed with RN events overnight. Review of Systems:  Symptom Y/N Comments  Symptom Y/N Comments   Fever/Chills y Tmax 104 in past 24 hrs  Chest Pain n    Poor Appetite n   Edema n    Cough n   Abdominal Pain n    Sputum n   Joint Pain n    SOB/ARREOLA n   Pruritis/Rash     Nausea/vomit    Tolerating PT/OT     Diarrhea    Tolerating Diet y    Constipation    Other       Could NOT obtain due to:      Objective:     VITALS:   Last 24hrs VS reviewed since prior progress note. Most recent are:  Patient Vitals for the past 24 hrs:   Temp Pulse Resp BP SpO2   01/26/18 1149 - 81 18 163/66 96 %   01/26/18 0744 97.6 °F (36.4 °C) (!) 102 18 (!) 161/93 92 %   01/26/18 0512 - - - - 93 %   01/26/18 0511 - - - - (!) 85 %   01/26/18 0503 97.6 °F (36.4 °C) - - - -   01/26/18 0400 - 80 - 142/83 97 %   01/26/18 0305 - 85 - (!) 125/105 96 %   01/26/18 0215 - - - - 95 %   01/26/18 0149 98.6 °F (37 °C) - - - 90 %   01/26/18 0118 - 85 - 148/56 -   01/25/18 1932 97.8 °F (36.6 °C) 72 - 122/57 94 %   01/25/18 1328 97.6 °F (36.4 °C) 77 20 113/44 98 %   01/25/18 1300 - 72 21 125/54 95 %   01/25/18 1245 - 73 22 131/64 95 %       Intake/Output Summary (Last 24 hours) at 01/26/18 1239  Last data filed at 01/26/18 0645   Gross per 24 hour   Intake          2583.34 ml   Output              400 ml   Net          2183.34 ml        PHYSICAL EXAM:  General: WD, WN. Alert, cooperative, no acute distress, frail & old looking   EENT:  EOMI. Anicteric sclerae. MMM  Resp:  CTA bilaterally, no wheezing or rales. No accessory muscle use  CV:  Regular  rhythm,  No edema  GI:  Soft, Non distended, Non tender.  +Bowel sounds  Neurologic:  Alert and oriented X 3, normal speech,   Psych:   Good insight. Not anxious nor agitated  Skin:  No rashes.   No jaundice    Reviewed most current lab test results and cultures  YES  Reviewed most current radiology test results   YES  Review and summation of old records today NO  Reviewed patient's current orders and MAR    YES  PMH/SH reviewed - no change compared to H&P  ________________________________________________________________________  Care Plan discussed with:    Comments   Patient x    Family  x Daughter at bedside   RN x    Care Manager x    Consultant                        Multidiciplinary team rounds were held today with , nursing, pharmacist and clinical coordinator. Patient's plan of care was discussed; medications were reviewed and discharge planning was addressed. ________________________________________________________________________  Total NON critical care TIME:  36   Minutes    Total CRITICAL CARE TIME Spent:   Minutes non procedure based      Comments   >50% of visit spent in counseling and coordination of care     ________________________________________________________________________  Avinash Cartwright MD     Procedures: see electronic medical records for all procedures/Xrays and details which were not copied into this note but were reviewed prior to creation of Plan. LABS:  I reviewed today's most current labs and imaging studies.   Pertinent labs include:  Recent Labs      01/26/18   0400  01/25/18   0525   WBC  9.1  12.1*   HGB  13.1  13.6   HCT  41.0  39.9   PLT  147*  146*     Recent Labs      01/26/18   0400  01/25/18   0525   NA  136  132*   K  3.9  3.2*   CL  105  95*   CO2  20*  25   GLU  111*  178*   BUN  35*  34*   CREA  1.19*  1.68*   CA  8.1*  8.6   MG  2.0   --    PHOS  3.5   --    ALB  2.7*  3.0*   TBILI  1.1*  1.9*   SGOT  53*  45*   ALT  26  19       Signed: Avinash Cartwright MD

## 2018-01-26 NOTE — WOUND CARE
Wound Care Consult: Chart reviewed and patient assessed for her left calf wound. Pt. Lives at [facility] and she sees Dr. Angie Michelle every week for her wound. The patient is up in a recliner today with her daughter in the room. Patient is on Droplet precautions for flu symptoms (neg flu test x 2). Assessment: Full thickness wound on the lower part of the left posterior calf. Pink wound bed with some slough and rolled edges. Fairly clean but needs more debridement when she is back home at the healthcare at [facility]. She recently had her wound debrided last week. Unknown what kind of wound care gets done there because the patient just stated, \"they cover it every day\". WOUND POA CONDITIONS    Wound Leg Lower Left;Posterior;Distal (Active)   DRESSING STATUS Removed 1/26/2018  5:14 PM   DRESSING TYPE Dry dressing 1/26/2018  5:14 PM   Non-Pressure Injury Full thickness (subcut/muscle) 1/26/2018  5:14 PM   Wound Length (cm) 6 cm 1/26/2018  5:14 PM   Wound Width (cm) 4.2 cm 1/26/2018  5:14 PM   Wound Depth (cm) 0.2 1/26/2018  5:14 PM   Wound Surface area (cm^3) 5.04 cm^2 1/26/2018  5:14 PM   Condition of Base [place];Slough 1/26/2018  5:14 PM   Condition of Edges Rolled/curled 1/26/2018  5:14 PM   Epithelialization (%) 0 1/26/2018  5:14 PM   Tissue Type Pink;Yellow 1/26/2018  5:14 PM   Drainage Amount  Small  1/26/2018  5:14 PM   Drainage Color Brown 1/26/2018  5:14 PM   Wound Odor None 1/26/2018  5:14 PM   Periwound Skin Condition Edematous; Erythema, blanchable 1/26/2018  5:14 PM   Cleansing and Cleansing Agents  Dermal wound cleanser 1/26/2018  5:14 PM   Dressing Type Applied Silver products; Wound gel; Absorptive;Gauze wrap (whitney) 1/26/2018  5:14 PM   Procedure Tolerated Fair 1/26/2018  5:14 PM       Treatment today: Pt. Was prepared for cleansing because this is a painful wound. Cleansed with Caraklenz spray and gauze (wiped once firmly but slowly) and dressed with Aquacel Ag and Carasyn Wound gel.  Covered with Exudry pad and wrapped with whitney. Plan: wound care orders written and wound care discussed with Nurse, Aggie MCRAE. Continue to follow up with Dr. Courtney Murphy at [address] [place] [address].   Olegario Stevens RN, BSN, [employer]

## 2018-01-26 NOTE — PROGRESS NOTES
Problem: Mobility Impaired (Adult and Pediatric)  Goal: *Acute Goals and Plan of Care (Insert Text)  Physical Therapy Goals  Initiated 1/26/2018  1. Patient will move from supine to sit and sit to supine , scoot up and down and roll side to side in bed with supervision/set-up within 7 day(s). 2.  Patient will transfer from bed to chair and chair to bed with supervision/set-up using the least restrictive device within 7 day(s). 3.  Patient will perform sit to stand with supervision/set-up within 7 day(s). 4.  Patient will ambulate with supervision/set-up for 100 feet with the least restrictive device within 7 day(s). physical Therapy EVALUATION  Patient: Soila Kahn (13 y.o. female)  Date: 1/26/2018  Primary Diagnosis: Severe sepsis ([facility]  PRACHI (acute kidney injury) ([facility]  Acute respiratory failure with hypoxia (HCC)  Leg ulcer, left (HCC)        Precautions: Fall, Contact       ASSESSMENT :  Based on the objective data described below, the patient presents with impaired functional mobility secondary to increased confusion, decreased coordination, impaired command following, impaired motor planning, decreased endurance/activity tolerance, and increased weakness. Pt received supine in bed w/ daughter present in room and pt agreeable to participation with therapy. Pt required MAX verbal cues for attention to task as well as motor planning throughout all aspects of functional mobility. Max Ax2 in order for pt to assume seated position EOB. Sitting balance intact w/ close supervision. Pt sit>>stand w/ minAx2 and ambulated 10ft w/ RW and Kaylah x2. Gait slow and shuffled (minimal step length/step height) overall with pt requiring maxA for RW management/propulsion.  All mobility occurred on 2 LPM O2 with O2 sats 86% post activity however recovering to 94% with seated rest. BP initially decreased to 100s/60s however also recovered to 150s/70s with seated rest. At this time, pt is functioning significantly below her baseline modified independent level and would benefit from additional skilled therapy to address the above mentioned deficits. Recommend continued 2 person assist during all OOB mobility/ambulation at this time. Pending resolution of pt's confusion as well as progress with therapy, recommendations TBD however pt will likely need SNF. Patient will benefit from skilled intervention to address the above impairments. Patients rehabilitation potential is considered to be Good  Factors which may influence rehabilitation potential include:   []         None noted  [x]         Mental ability/status  []         Medical condition  []         Home/family situation and support systems  []         Safety awareness  []         Pain tolerance/management  []         Other:      PLAN :  Recommendations and Planned Interventions:  [x]           Bed Mobility Training             []    Neuromuscular Re-Education  [x]           Transfer Training                   []    Orthotic/Prosthetic Training  [x]           Gait Training                         []    Modalities  [x]           Therapeutic Exercises           []    Edema Management/Control  [x]           Therapeutic Activities            [x]    Patient and Family Training/Education  []           Other (comment):    Frequency/Duration: Patient will be followed by physical therapy  4 times a week to address goals. Discharge Recommendations: Skilled Nursing Facility  Further Equipment Recommendations for Discharge: TBD however owns rollator walker     SUBJECTIVE:   Patient stated I like to do stained glass and painting.     OBJECTIVE DATA SUMMARY:   HISTORY:    Past Medical History:   Diagnosis Date    Arthritis     Atrial fibrillation ([facility] 12/2015    post hip surgery    Chronic obstructive pulmonary disease (HCC)     Gastrointestinal disorder     gallstones    Hypertension     Iron deficiency     Leg ulcer (HCC)     Macular degeneration     OA (osteoarthritis) of shoulder     Spinal stenosis     lumbar     Past Surgical History:   Procedure Laterality Date    HX HEENT      cataract surgery with lens implants    HX ORTHOPAEDIC      right hip surgery     Prior Level of Function/Home Situation: Pt lives at [address] [place] [address]. Reports modified independence w/ use of rollator walker during ambulation. Denies history of falls. Denies home O2 use. Independent ADLs. Active at facility, attending art classes. Personal factors and/or comorbidities impacting plan of care:     Home Situation  Home Environment: Assisted living  [address] [facility] [address] Name: [facility]  One/Two Story Residence: One story  Living Alone: Yes  Support Systems: Child(cliff), Family member(s)  Patient Expects to be Discharged to[de-identified] Assisted living  Current DME Used/Available at Home: Adriane Salinas, rollator    EXAMINATION/PRESENTATION/DECISION MAKING:   Critical Behavior:  Neurologic State: Alert, Confused  Orientation Level: Disoriented to place, Disoriented to situation, Disoriented to time  Cognition: Impaired decision making, Poor safety awareness     Hearing: Auditory  Auditory Impairment: None  Skin:  Intact  Edema:  None noted   Range Of Motion:  AROM: Generally decreased, functional                       Strength:    Strength: Generally decreased, functional                    Tone & Sensation:   Tone: Normal              Sensation: Intact               Coordination:  Coordination: Generally decreased, functional  Vision:      Functional Mobility:  Bed Mobility:  Rolling: Maximum assistance;Assist x2  Supine to Sit: Maximum assistance;Assist x2     Scooting: Maximum assistance;Assist x2  Transfers:  Sit to Stand: Minimum assistance;Assist x2  Stand to Sit: Minimum assistance;Assist x2        Bed to Chair: Minimum assistance;Assist x2 (maxA for RW management and attention to task)              Balance:   Sitting: Intact  Standing: Impaired; With support  Standing - Static: Fair;Constant support  Standing - Dynamic : Fair  Ambulation/Gait Training:  Distance (ft): 10 Feet (ft)  Assistive Device: Walker, rolling;Gait belt  Ambulation - Level of Assistance: Assist x2;Minimal assistance        Gait Abnormalities: Shuffling gait; Decreased step clearance        Base of Support: Narrowed     Speed/Yamilex: Shuffled; Slow  Step Length: Left shortened;Right shortened                    Functional Measure:  Barthel Index:    Bathin  Bladder: 0  Bowels: 0  Groomin  Dressin  Feedin  Mobility: 0  Stairs: 0  Toilet Use: 5  Transfer (Bed to Chair and Back): 5  Total: 20       Barthel and G-code impairment scale:  Percentage of impairment CH  0% CI  1-19% CJ  20-39% CK  40-59% CL  60-79% CM  80-99% CN  100%   Barthel Score 0-100 100 99-80 79-60 59-40 20-39 1-19   0   Barthel Score 0-20 20 17-19 13-16 9-12 5-8 1-4 0      The Barthel ADL Index: Guidelines  1. The index should be used as a record of what a patient does, not as a record of what a patient could do. 2. The main aim is to establish degree of independence from any help, physical or verbal, however minor and for whatever reason. 3. The need for supervision renders the patient not independent. 4. A patient's performance should be established using the best available evidence. Asking the patient, friends/relatives and nurses are the usual sources, but direct observation and common sense are also important. However direct testing is not needed. 5. Usually the patient's performance over the preceding 24-48 hours is important, but occasionally longer periods will be relevant. 6. Middle categories imply that the patient supplies over 50 per cent of the effort. 7. Use of aids to be independent is allowed. Vandana Matute., Barthel, D.W. ([id number]). Functional evaluation: the Barthel Index. [facility] (14)2. Baker Settles darrell [place], J.J.M.F, Martinez Lai., Rikki Galaviz., Madison Rey, [address] [place] [address] ().  Measuring the change indisability after inpatient rehabilitation; comparison of the responsiveness of the Barthel Index and Functional [place] Measure. Journal of Neurology, Neurosurgery, and Psychiatry, 66(4), 438-580. SHERYL Rashid, PRISCILLA Antunez, & Ting Lynch M.A. (2004.) Assessment of post-stroke quality of life in cost-effectiveness studies: The usefulness of the Barthel Index and the EuroQoL-5D. Quality of Life Research, 13, 164-37       G codes: In compliance with CMSs Claims Based Outcome Reporting, the following G-code set was chosen for this patient based on their primary functional limitation being treated: The outcome measure chosen to determine the severity of the functional limitation was the Barthel Index with a score of 20/100 which was correlated with the impairment scale. ? Mobility - Walking and Moving Around:     - CURRENT STATUS: CL - 60%-79% impaired, limited or restricted    - GOAL STATUS: CI - 1%-19% impaired, limited or restricted    - D/C STATUS:  ---------------To be determined---------------      Physical Therapy Evaluation Charge Determination   History Examination Presentation Decision-Making   MEDIUM  Complexity : 1-2 comorbidities / personal factors will impact the outcome/ POC  MEDIUM Complexity : 3 Standardized tests and measures addressing body structure, function, activity limitation and / or participation in recreation  MEDIUM Complexity : Evolving with changing characteristics  MEDIUM Complexity : FOTO score of 26-74      Based on the above components, the patient evaluation is determined to be of the following complexity level: MEDIUM    Pain:  Pain Scale 1: Numeric (0 - 10)  Pain Intensity 1: 0              Activity Tolerance:   O2 sats 86% post activity on 2 LPM O2, recovered with seated rest  Please refer to the flowsheet for vital signs taken during this treatment.   After treatment:   [x]         Patient left in no apparent distress sitting up in chair  [] Patient left in no apparent distress in bed  [x]         Call bell left within reach  [x]         Nursing notified  [x]         Caregiver present  [x]         Bed alarm activated    COMMUNICATION/EDUCATION:   The patients plan of care was discussed with: Registered Nurse. [x]         Fall prevention education was provided and the patient/caregiver indicated understanding. [x]         Patient/family have participated as able in goal setting and plan of care. [x]         Patient/family agree to work toward stated goals and plan of care. []         Patient understands intent and goals of therapy, but is neutral about his/her participation. []         Patient is unable to participate in goal setting and plan of care.     Thank you for this referral.  Pam Solomon, PT, DPT   Time Calculation: 32 mins

## 2018-01-26 NOTE — PROGRESS NOTES
PCU SHIFT NURSING NOTE      Bedside shift change report given to Pedro Carvajal (oncoming nurse) by Kamlesh Walsh (offgoing nurse). Report included the following information SBAR, Kardex, Intake/Output, MAR and Recent Results. Shift Summary: 1543  Pt in bed resting  A&O to self only  4LO2 NC  NSR w/ PVCs   2 max assist  Droplet precautions    1035  Vanc trough sent to lab  Family at bedside   1200  Admission database completed    1300  Pt up in chair  Purewick placed      Admission Date 1/25/2018   Admission Diagnosis Severe sepsis ([facility]  PRACHI (acute kidney injury) ([facility]  Acute respiratory failure with hypoxia ([facility]  Leg ulcer, left ([facility]   Consults None        Consults   []PT   []OT   []Speech   []Case Management      [] Palliative      Cardiac Monitoring Order   []Yes   []No     IV drips   []Yes    Drip:                            Dose:  Drip:                            Dose:  Drip:                            Dose:   []No     GI Prophylaxis   []Yes   []No         DVT Prophylaxis   SCDs:             Leonidas stockings:         [] Medication   []Contraindicated   []None      Activity Level Activity Level: Up with Assistance     Activity Assistance: Partial (two people)   Purposeful Rounding every 1-2 hour? []Yes   Griffith Score  Total Score: 4   Bed Alarm (If score 3 or >)   []Yes   [] Refused (See signed refusal form in chart)   Neel Score  Neel Score: 16   Neel Score (if score 14 or less)   []PMT consult   []Wound Care consult      []Specialty bed   [] Nutrition consult          Needs prior to discharge:   Home O2 required:    []Yes   []No    If yes, how much O2 required?     Other:    Last Bowel Movement: Last Bowel Movement Date: 01/25/18      Influenza Vaccine Received Flu Vaccine for Current Season (usually Sept-March): Yes        Pneumonia Vaccine           Diet Active Orders   Diet    DIET CARDIAC Regular      LDAs               Peripheral IV 01/25/18 Left Hand (Active)   Site Assessment Clean, dry, & intact 1/26/2018 4: 62 AM   Phlebitis Assessment 0 1/26/2018  4:57 AM   Infiltration Assessment 0 1/26/2018  4:57 AM   Dressing Status Clean, dry, & intact 1/26/2018  4:57 AM   Dressing Type Transparent 1/26/2018 12:01 AM   Hub Color/Line Status Pink;Flushed;Patent 1/26/2018 12:01 AM   Action Taken Blood drawn 1/25/2018  5:52 AM                      Urinary Catheter      Intake & Output   Date 01/25/18 0700 - 01/26/18 0659 01/26/18 0700 - 01/27/18 0659   Shift [id number] 1900-0659 24 Hour Total [id number] 1900-0659 24 Hour Total   I  N  T  A  K  E   P.O.  200 200         P. O.  200 200       I.V.  (mL/kg/hr)  2383.3  (2.3) 2383.3  (1.1)         Volume (0.9% sodium chloride infusion)  2383.3 2383. 3       Shift Total  (mL/kg)  2583.3  (29.7) 2583.3  (29.7)      O  U  T  P  U  T   Urine  (mL/kg/hr) 400  (0.4)  400  (0.2)         Urine 400  400         Urine Occurrence(s) 1 x 4 x 5 x       Shift Total  (mL/kg) 400  (4.6)  400  (4.6)      NET -400 2583.3 2183. 3      Weight (kg) 87.1 87.1 87.1 89.8 89.8 89.8         Readmission Risk Assessment Tool Score Low Risk            10       Total Score        3 Has Seen PCP in Last 6 Months (Yes=3, No=0)    2 . Living with Significant Other. Assisted Living. LTAC. SNF. or   Rehab    5 Pt.  Coverage (Medicare=5 , Medicaid, or Self-Pay=4)        Criteria that do not apply:    Patient Length of Stay (>5 days = 3)    IP Visits Last 12 Months (1-3=4, 4=9, >4=11)    Charlson Comorbidity Score (Age + Comorbid Conditions)       Expected Length of Stay - - -   Actual Length of Stay 1

## 2018-01-27 LAB
ANION GAP SERPL CALC-SCNC: 11 MMOL/L (ref 5–15)
ATRIAL RATE: 43 BPM
BACTERIA SPEC CULT: NORMAL
BUN SERPL-MCNC: 37 MG/DL (ref 6–20)
BUN/CREAT SERPL: 34 (ref 12–20)
CALCIUM SERPL-MCNC: 8.6 MG/DL (ref 8.5–10.1)
CALCULATED R AXIS, ECG10: 110 DEGREES
CALCULATED T AXIS, ECG11: 12 DEGREES
CC UR VC: NORMAL
CHLORIDE SERPL-SCNC: 108 MMOL/L (ref 97–108)
CO2 SERPL-SCNC: 21 MMOL/L (ref 21–32)
CREAT SERPL-MCNC: 1.1 MG/DL (ref 0.55–1.02)
DIAGNOSIS, 93000: NORMAL
ERYTHROCYTE [DISTWIDTH] IN BLOOD BY AUTOMATED COUNT: 15.6 % (ref 11.5–14.5)
GLUCOSE SERPL-MCNC: 104 MG/DL (ref 65–100)
HCT VFR BLD AUTO: 40.5 % (ref 35–47)
HGB BLD-MCNC: 13.1 G/DL (ref 11.5–16)
MCH RBC QN AUTO: 29.2 PG (ref 26–34)
MCHC RBC AUTO-ENTMCNC: 32.3 G/DL (ref 30–36.5)
MCV RBC AUTO: 90.4 FL (ref 80–99)
NRBC # BLD: 0 K/UL (ref 0–0.01)
NRBC BLD-RTO: 0 PER 100 WBC
PLATELET # BLD AUTO: 142 K/UL (ref 150–400)
PMV BLD AUTO: 11.1 FL (ref 8.9–12.9)
POTASSIUM SERPL-SCNC: 3.7 MMOL/L (ref 3.5–5.1)
Q-T INTERVAL, ECG07: 368 MS
QRS DURATION, ECG06: 82 MS
QTC CALCULATION (BEZET), ECG08: 442 MS
RBC # BLD AUTO: 4.48 M/UL (ref 3.8–5.2)
SERVICE CMNT-IMP: NORMAL
SODIUM SERPL-SCNC: 140 MMOL/L (ref 136–145)
VENTRICULAR RATE, ECG03: 87 BPM
WBC # BLD AUTO: 9.7 K/UL (ref 3.6–11)

## 2018-01-27 PROCEDURE — 77030009834 HC MSK O2 TRACH VYRM -A

## 2018-01-27 PROCEDURE — 74011250636 HC RX REV CODE- 250/636: Performed by: INTERNAL MEDICINE

## 2018-01-27 PROCEDURE — 80048 BASIC METABOLIC PNL TOTAL CA: CPT | Performed by: HOSPITALIST

## 2018-01-27 PROCEDURE — 93005 ELECTROCARDIOGRAM TRACING: CPT

## 2018-01-27 PROCEDURE — 74011250637 HC RX REV CODE- 250/637: Performed by: INTERNAL MEDICINE

## 2018-01-27 PROCEDURE — 85027 COMPLETE CBC AUTOMATED: CPT | Performed by: INTERNAL MEDICINE

## 2018-01-27 PROCEDURE — 65660000000 HC RM CCU STEPDOWN

## 2018-01-27 PROCEDURE — 74011250637 HC RX REV CODE- 250/637: Performed by: HOSPITALIST

## 2018-01-27 PROCEDURE — 74011250636 HC RX REV CODE- 250/636: Performed by: HOSPITALIST

## 2018-01-27 PROCEDURE — 36415 COLL VENOUS BLD VENIPUNCTURE: CPT | Performed by: HOSPITALIST

## 2018-01-27 PROCEDURE — 77010033678 HC OXYGEN DAILY

## 2018-01-27 RX ORDER — HALOPERIDOL 5 MG/ML
5 INJECTION INTRAMUSCULAR
Status: DISCONTINUED | OUTPATIENT
Start: 2018-01-27 | End: 2018-01-29 | Stop reason: HOSPADM

## 2018-01-27 RX ORDER — ATENOLOL 50 MG/1
50 TABLET ORAL
Status: DISCONTINUED | OUTPATIENT
Start: 2018-01-27 | End: 2018-01-29 | Stop reason: HOSPADM

## 2018-01-27 RX ORDER — FUROSEMIDE 40 MG/1
40 TABLET ORAL DAILY
Status: DISCONTINUED | OUTPATIENT
Start: 2018-01-27 | End: 2018-01-29 | Stop reason: HOSPADM

## 2018-01-27 RX ADMIN — HALOPERIDOL LACTATE 5 MG: 5 INJECTION, SOLUTION INTRAMUSCULAR at 00:22

## 2018-01-27 RX ADMIN — Medication 10 ML: at 21:50

## 2018-01-27 RX ADMIN — HEPARIN SODIUM 5000 UNITS: 5000 INJECTION, SOLUTION INTRAVENOUS; SUBCUTANEOUS at 18:44

## 2018-01-27 RX ADMIN — ATENOLOL 50 MG: 50 TABLET ORAL at 21:50

## 2018-01-27 RX ADMIN — Medication 10 ML: at 05:10

## 2018-01-27 RX ADMIN — OSELTAMIVIR PHOSPHATE 30 MG: 30 CAPSULE ORAL at 18:50

## 2018-01-27 RX ADMIN — HEPARIN SODIUM 5000 UNITS: 5000 INJECTION, SOLUTION INTRAVENOUS; SUBCUTANEOUS at 09:59

## 2018-01-27 RX ADMIN — HEPARIN SODIUM 5000 UNITS: 5000 INJECTION, SOLUTION INTRAVENOUS; SUBCUTANEOUS at 00:22

## 2018-01-27 RX ADMIN — OSELTAMIVIR PHOSPHATE 30 MG: 30 CAPSULE ORAL at 10:04

## 2018-01-27 RX ADMIN — NYSTATIN: 100000 POWDER TOPICAL at 20:35

## 2018-01-27 RX ADMIN — DOCUSATE SODIUM 100 MG: 100 CAPSULE, LIQUID FILLED ORAL at 09:59

## 2018-01-27 RX ADMIN — DOCUSATE SODIUM 100 MG: 100 CAPSULE, LIQUID FILLED ORAL at 18:44

## 2018-01-27 RX ADMIN — ASPIRIN 81 MG: 81 TABLET, COATED ORAL at 09:59

## 2018-01-27 RX ADMIN — FUROSEMIDE 40 MG: 40 TABLET ORAL at 12:29

## 2018-01-27 RX ADMIN — NYSTATIN: 100000 POWDER TOPICAL at 10:00

## 2018-01-27 NOTE — PROGRESS NOTES
ADULT PROTOCOL: JET AEROSOL ASSESSMENT    Patient  Sandra Burch     80 y.o.   female     1/27/2018  12:30 AM    Breath Sounds Pre Procedure: Right Breath Sounds: Diminished                               Left Breath Sounds: Diminished    Breath Sounds Post Procedure: Right Breath Sounds: Diminished                                 Left Breath Sounds: Diminished    Breathing pattern: Pre procedure Breathing Pattern: Regular          Post procedure Breathing Pattern: Regular    Heart Rate: Pre procedure Pulse: 97           Post procedure Pulse: 99    Resp Rate: Pre procedure Respirations: 20           Post procedure Respirations: 20    Peak Flow: Pre bronchodilator   NA          Post bronchodilator   NA    FVC/FEV1:  NA    Incentive Spirometry:   NA      NA    Cough: Pre procedure Cough: Non-productive               Post procedure Cough: Non-productive    Suctioned: NO    Sputum: Pre procedure  NA                 Post procedure  NA    Oxygen: O2 Device: Nasal cannula   2L     Changed: NO    SpO2: Pre procedure SpO2: 99 %   with oxygen              Post procedure SpO2: 100 %  with oxygen    Nebulizer Therapy: Current medications        Changed: NO    Smoking History:  Former smoker, Quit 4/2/1991    Problem List:   Patient Active Problem List   Diagnosis Code    Fracture of femoral neck, right ([facility]) S72.001A    Hip fracture ([facility]) S72.009A    SOB (shortness of breath) R06.02    A-fib ([facility]) I48.91    Severe sepsis ([facility]) A41.9, R65.20    PRACHI (acute kidney injury) ([facility] N17.9    Acute respiratory failure with hypoxia ([facility] J96.01    Leg ulcer, left ([facility] L97.929    Viral syndrome B34.9       Respiratory Therapist: Clayton De La Rosa, RT

## 2018-01-27 NOTE — PROGRESS NOTES
PCU SHIFT NURSING NOTE      Bedside and Verbal shift change report given to [facility] GIRISH KENNEDY (oncoming nurse) by Dallas Jones RN (offgoing nurse). Report included the following information SBAR, Kardex, Intake/Output, MAR, Accordion, Recent Results, Med Rec Status, Cardiac Rhythm Afib and Alarm Parameters . Shift Summary:     [address] - Patient's family stating that patient is more agitated than usual and not sleeping well. MD paged. 2000 - Patient's agitation increasing. No return call from MD at this point. MD paged for a second time. 2030 - Still no response from MD and patient's MEWS now a 5 for increased agitation and RR of 30 . RRT called. 2100 - 3mg IM haldol given per MD order. 2230 - Patient still confused, seeing things on the floor that are not there, and agitated. MD paged. 2mg IM haldol administered per new order. 2300 - Patient now resting quietly. 0000 - Patient attempting to get out of bed, seeing babies in her room, and appears very agitated. Patient reoriented and repositioned in bed; MD paged. 0030 - 5 mg IV haldol given per new order. Will continue to monitor. 0130 - Patient again appears agitated and attempting to get out of bed. Patient reoriented and repositioned. Upon leaving the room patient resting quietly. Will continue to monitor.       Admission Date 1/25/2018   Admission Diagnosis Severe sepsis ([facility]  PRACHI (acute kidney injury) ([facility]  Acute respiratory failure with hypoxia (H[facility])  Leg ulcer, left ([address]   Consults None        Consults   []PT   []OT   []Speech   []Case Management      [] Palliative      Cardiac Monitoring Order   []Yes   []No     IV drips   []Yes    Drip:                            Dose:  Drip:                            Dose:  Drip:                            Dose:   []No     GI Prophylaxis   []Yes   []No         DVT Prophylaxis   SCDs:             Leonidas stockings:         [] Medication   []Contraindicated   []None      Activity Level Activity Level: Up with Assistance     Activity Assistance: Partial (two people)   Purposeful Rounding every 1-2 hour? []Yes   Griffith Score  Total Score: 4   Bed Alarm (If score 3 or >)   []Yes   [] Refused (See signed refusal form in chart)   Neel Score  Neel Score: 15   Neel Score (if score 14 or less)   []PMT consult   []Wound Care consult      []Specialty bed   [] Nutrition consult          Needs prior to discharge:   Home O2 required:    []Yes   []No    If yes, how much O2 required? Other:    Last Bowel Movement: Last Bowel Movement Date: 01/25/18      Influenza Vaccine Received Flu Vaccine for Current Season (usually Sept-March): Yes        Pneumonia Vaccine           Diet Active Orders   Diet    DIET CARDIAC Soft Solids      LDAs               Peripheral IV 01/26/18 Right Arm (Active)   Site Assessment Clean, dry, & intact 1/26/2018  7:07 PM   Phlebitis Assessment 0 1/26/2018  7:07 PM   Infiltration Assessment 0 1/26/2018  7:07 PM   Dressing Status Clean, dry, & intact 1/26/2018  7:07 PM   Dressing Type Transparent 1/26/2018  7:07 PM   Hub Color/Line Status Blue; Infusing;Flushed 1/26/2018  7:07 PM          External Female Catheter 01/26/18 (Active)   Site Assessment Clean, dry, & intact 1/26/2018  1:15 PM   Repositioned Yes 1/26/2018  1:15 PM   Perineal Care Yes 1/26/2018  1:15 PM   Wick Changed Yes 1/26/2018  1:15 PM   Suction Canister/Tubing Changed Yes 1/26/2018  1:15 PM                Urinary Catheter      Intake & Output   Date 01/26/18 0700 - 01/27/18 0659 01/27/18 0700 - 01/28/18 0659   Shift 3712-01081859 1900-0659 24 Hour Total 0700-1859 1900-0659 24 Hour Total   I  N  T  A  K  E   Shift Total  (mL/kg)         O  U  T  P  U  T   Urine  (mL/kg/hr)            Urine Occurrence(s) 1 x  1 x       Shift Total  (mL/kg)         NET         Weight (kg) 89.8 89.8 89.8 89.8 89.8 89.8         Readmission Risk Assessment Tool Score Low Risk            8       Total Score        3 Has Seen PCP in Last 6 Months (Yes=3, No=0)    5 Pt. Coverage (Medicare=5 , Medicaid, or Self-Pay=4)        Criteria that do not apply:    . Living with Significant Other. Assisted Living. LTAC. SNF.  or   Rehab    Patient Length of Stay (>5 days = 3)    IP Visits Last 12 Months (1-3=4, 4=9, >4=11)    Charlson Comorbidity Score (Age + Comorbid Conditions)       Expected Length of Stay 4d 21h   Actual Length of Stay 2

## 2018-01-27 NOTE — PROGRESS NOTES
Called in to see the for agitation /confusion since last 2 hrs . Patient confused and unable to give any history . As per nursing staff patient unable T not slept for last 24 hrs. Physical exam   AAO x  Vital reviewed /90 , DC. 96 afebrile  Heent:  Shante EOMI  Lungs . Rhonchi noted  B/CL at bases. CVS. S1, S2 no gallops . No murmurs    A/P  Delirium. Will give Haldol 3 mg I/m  Will r/o metabolic encephalopathy .  chk labs  Xray showed COPD changes will give Duonebs  Will cont to follow

## 2018-01-27 NOTE — PROGRESS NOTES
Pharmacy Automatic Renal Dosing Protocol - Antimicrobials/Vancomycin  Indication for Antimicrobials: Sepsis, left leg ulcer    Current Regimen of Each Antimicrobial:  Vancomycin IV 2000 mg IV yesterday, (Start Day ; day #2)  Oseltamivir 30 mg po x 1 dose, then 30mg po daily for CrCl 10-29ml/min (Start ; day #1)  Levofloxacin 750 mg Q48 hours (Start Date ; day #1)    Previous antimicrobials:  Patient received a levaquin 750 mg oral dose on 18 as an outpatient)    Goal Level: VANCOMYCIN TROUGH GOAL RANGE   15-20 mcg/ml-until blood cx results, and if negative can reduce trough level goal to 10-15 mcg/mL    Measured / Extrapolated Vancomycin Level:   = random- 11.6 mcg/ml    Significant Cultures:   18 - Blood NGx1 day- Prelim  18- Urine- Pending  Flu negative    Paralysis, amputations, malnutrition: None    Labs:  Recent Labs      18   0525   CREA  1.68*   BUN  34*   WBC  12.1*     Temp (24hrs), Av.6 °F (39.2 °C), Min:100.6 °F (38.1 °C), Max:104.5 °F (40.3 °C)    Creatinine Clearance (mL/min) or Dialysis: ~30 ml/min  No results found for: PCT    Impression/Plan:    Back to Cr basline of ~ 1   Levaquin dose adjusted to 750 mg Q48 hours for CrCl <50 ml/min   Tamiflu dose appropriate for renal function and indication   Continue vancomycin 1250 mg Q24 hours for anticipated trough 15-20 mcg/ml   Daily [facility]      Pharmacy will follow daily and adjust medications as appropriate for renal function and/or serum levels. Thank you, YADIRA Pinto          Recommended duration of therapy  http://[facility]/[place]/[facility]/Pharmacy/Clinical%20Companion/Duration%20of%[email or web address]. docx    Renal Dosing  http://[facility]/[place]/[facility]/Pharmacy/Clinical%20Companion/Renal%[email or web address]. pdf

## 2018-01-27 NOTE — PROGRESS NOTES
PCU SHIFT NURSING NOTE      Bedside shift change report given to Marisela Lara (oncoming nurse) by [facility] ([facility] Report included the following information SBAR, Kardex, Intake/Output, MAR and Recent Results. Shift Summary: 0825  Pt in bed resting  No complaints of pain  4LO2 NC  AFIB on monitor    1000  Pt daughters at bedside  Pt is completely A&O with no signs of confusion    1415  Assisted pt to recliner  Purewick changed  1430  O2 decreased to 2L  Continue to monitor    1600  Wound care completed      Admission Date 1/25/2018   Admission Diagnosis Severe sepsis ([address]  PRACHI (acute kidney injury) ([address]  Acute respiratory failure with hypoxia ([address]  Leg ulcer, left ([address]   Consults None        Consults   []PT   []OT   []Speech   []Case Management      [] Palliative      Cardiac Monitoring Order   []Yes   []No     IV drips   []Yes    Drip:                            Dose:  Drip:                            Dose:  Drip:                            Dose:   []No     GI Prophylaxis   []Yes   []No         DVT Prophylaxis   SCDs:             Leonidas stockings:         [] Medication   []Contraindicated   []None      Activity Level Activity Level: Up with Assistance     Activity Assistance: Partial (two people)   Purposeful Rounding every 1-2 hour? []Yes   Griffith Score  Total Score: 4   Bed Alarm (If score 3 or >)   []Yes   [] Refused (See signed refusal form in chart)   Neel Score  Neel Score: 15   Neel Score (if score 14 or less)   []PMT consult   []Wound Care consult      []Specialty bed   [] Nutrition consult          Needs prior to discharge:   Home O2 required:    []Yes   []No    If yes, how much O2 required?     Other:    Last Bowel Movement: Last Bowel Movement Date: 01/25/18      Influenza Vaccine Received Flu Vaccine for Current Season (usually Sept-March): Yes        Pneumonia Vaccine           Diet Active Orders   Diet    DIET CARDIAC Soft Solids      LDAs               Peripheral IV 01/26/18 Right Arm (Active)   Site Assessment Clean, dry, & intact 1/27/2018  3:47 AM   Phlebitis Assessment 0 1/27/2018  3:47 AM   Infiltration Assessment 0 1/27/2018  3:47 AM   Dressing Status Clean, dry, & intact 1/27/2018  3:47 AM   Dressing Type Transparent;Tape 1/27/2018  3:47 AM   Hub Color/Line Status Patent; Flushed 1/27/2018  3:47 AM          External Female Catheter 01/26/18 (Active)   Site Assessment Clean, dry, & intact 1/27/2018  3:47 AM   Repositioned Yes 1/27/2018  3:47 AM   Perineal Care Yes 1/27/2018  3:47 AM   Wick Changed Yes 1/27/2018  3:47 AM   Suction Canister/Tubing Changed Yes 1/27/2018  3:47 AM   Urine Output (mL) 700 ml 1/27/2018  1:33 AM                Urinary Catheter      Intake & Output   Date 01/26/18 0700 - 01/27/18 0659 01/27/18 0700 - 01/28/18 0659   Shift 0324-0624 6585-9087 24 Hour Total 0391-7955 9036-6675 24 Hour Total   I  N  T  A  K  E   Shift Total  (mL/kg)         O  U  T  P  U  T   Urine  (mL/kg/hr)  700  (0.7) 700  (0.3)         Urine Occurrence(s) 1 x  1 x         Urine Output (mL) (External Female Catheter 01/26/18)  700 700       Shift Total  (mL/kg)  700  (7.8) 700  (7.8)      NET  -700 -700      Weight (kg) 89.8 89.5 89.5 89.5 89.5 89.5         Readmission Risk Assessment Tool Score Low Risk            8       Total Score        3 Has Seen PCP in Last 6 Months (Yes=3, No=0)    5 Pt. Coverage (Medicare=5 , Medicaid, or Self-Pay=4)        Criteria that do not apply:    . Living with Significant Other. Assisted Living. LTAC. SNF.  or   Rehab    Patient Length of Stay (>5 days = 3)    IP Visits Last 12 Months (1-3=4, 4=9, >4=11)    Charlson Comorbidity Score (Age + Comorbid Conditions)       Expected Length of Stay 4d 21h   Actual Length of Stay 2

## 2018-01-27 NOTE — PROGRESS NOTES
Hospitalist Progress Note    NAME: Sandra Burch   :  1925   MRN:  [id number]       Assessment / Plan:  Sundowning/Delirium ( night)  S/p haldol   Prn now    Severe sepsis, POA   Likely due to Viral syndrome + Possible UTI POA  Acute hypoxic respiratory failure  86% RA, 95% 3L. Suspect due to sepsis  COPD, not in acute exacerbation  CXR neg  V/Q scan neg PE  Flu test neg  UA +ve  Urine Cx negative Final  Blood Cx remains neg x 2 days    DC IV Levaquin & vancomycin today  Continue with tamiflu empirically for now- pt high risk  DC IVF  resume atenolol and lasix today  PT/OT consult for DC planning- may need to go to health care section of [facility] on DC fall precaution. PRACHI POA- resolved  Due to Dehydration POA- resolved  Cr 1.68-> 1.1    DC IVF  BMP in AM  S/p lasix x 1 yest PM    Elevated trop 0.58 POA- now down to 0.3--  ekg looks like afib but exam seems more c/w with SR. V/Q scan low probability for PE. Troponin leak could be from sepsis or due to [facility]. Echo normal EF, no WMA    Cont aspirin 81mg daily,   Can resume atenolol soon  S/p Lasix x 1  PM    Hypovolemic hyponatremia Na 132 POA- now resolved  Hypokalemia 3.2 POA- resolved  Observe now    Generalized weakness due to severe sepsis, POA  Chronic left posterior calf ulcer since 2017. Wound does not appear infected  PAD involving lower legs and carotid artery per daughter  Cholelithiasis POA  Hx afib post hip ORIF . Not on anticoagulation    wound care consulted for chronic L post calf ulcer/wound      Code:    No advance directive. Does not want heroic measures. DNR/DNI  DVT prophylaxis: heparin  Surrogate decision maker:  Daughter Linda Mendez cell [phone]    Recommended Disposition: SNF/LTC soon 1-2 days     Subjective:     Chief Complaint / Reason for Physician Visit :F/U Fever, sepsis, Viral syndrome, chronic wounds, PRACHI, Sundowning  \"I feel much better\". Discussed with RN events overnight.      Review of Systems:  Symptom Y/N Comments  Symptom Y/N Comments   Fever/Chills n afebrile  Chest Pain n    Poor Appetite n   Edema n    Cough n   Abdominal Pain n    Sputum n   Joint Pain n    SOB/ARREOLA n   Pruritis/Rash     Nausea/vomit    Tolerating PT/OT     Diarrhea    Tolerating Diet y    Constipation    Other       Could NOT obtain due to:      Objective:     VITALS:   Last 24hrs VS reviewed since prior progress note. Most recent are:  Patient Vitals for the past 24 hrs:   Temp Pulse Resp BP SpO2   01/27/18 0822 97.8 °F (36.6 °C) 89 22 165/75 100 %   01/27/18 0347 97.6 °F (36.4 °C) 82 20 124/54 98 %   01/27/18 0055 97.6 °F (36.4 °C) 96 20 139/73 96 %   01/26/18 2243 97.6 °F (36.4 °C) 98 20 158/60 97 %   01/26/18 2223 - 100 - 149/85 -   01/26/18 2103 - - - - 99 %   01/26/18 2013 97.3 °F (36.3 °C) 100 30 (!) 162/91 94 %   01/26/18 1931 97.6 °F (36.4 °C) 94 22 170/84 96 %   01/26/18 1547 98.5 °F (36.9 °C) 99 24 164/73 94 %       Intake/Output Summary (Last 24 hours) at 01/27/18 1156  Last data filed at 01/27/18 0133   Gross per 24 hour   Intake                0 ml   Output              700 ml   Net             -700 ml        PHYSICAL EXAM:  General: WD, WN. Alert, cooperative, no acute distress, frail & old looking   EENT:  EOMI. Anicteric sclerae. MMM  Resp:  CTA bilaterally, no wheezing or rales. No accessory muscle use  CV:  Regular  rhythm,  No edema  GI:  Soft, Non distended, Non tender.  +Bowel sounds  Neurologic:  Alert and oriented X 3, normal speech,   Psych:   Good insight. Not anxious nor agitated  Skin:  No rashes.   No jaundice    Reviewed most current lab test results and cultures  YES  Reviewed most current radiology test results   YES  Review and summation of old records today    NO  Reviewed patient's current orders and MAR    YES  PMH/SH reviewed - no change compared to H&P  ________________________________________________________________________  Care Plan discussed with:    Comments   Patient x    Family x Daughters at bedside   RN x    Care Manager     Consultant                        Multidiciplinary team rounds were held today with , nursing, pharmacist and clinical coordinator. Patient's plan of care was discussed; medications were reviewed and discharge planning was addressed. ________________________________________________________________________  Total NON critical care TIME:  26   Minutes    Total CRITICAL CARE TIME Spent:   Minutes non procedure based      Comments   >50% of visit spent in counseling and coordination of care     ________________________________________________________________________  Fletcher Andrade MD     Procedures: see electronic medical records for all procedures/Xrays and details which were not copied into this note but were reviewed prior to creation of Plan. LABS:  I reviewed today's most current labs and imaging studies.   Pertinent labs include:  Recent Labs      01/27/18   0420  01/26/18   2100  01/26/18   0400   WBC  9.7  12.4*  9.1   HGB  13.1  13.5  13.1   HCT  40.5  42.4  41.0   PLT  142*  163  147*     Recent Labs      01/27/18   0420  01/26/18   2100  01/26/18   0400  01/25/18   0525   NA  140  140  136  132*   K  3.7  3.6  3.9  3.2*   CL  108  109*  105  95*   CO2  21  22  20*  25   GLU  104*  101*  111*  178*   BUN  37*  38*  35*  34*   CREA  1.10*  1.06*  1.19*  1.68*   CA  8.6  7.9*  8.1*  8.6   MG   --    --   2.0   --    PHOS   --    --   3.5   --    ALB   --   2.7*  2.7*  3.0*   TBILI   --   1.2*  1.1*  1.9*   SGOT   --   47*  53*  45*   ALT   --   28  26 19       Signed: Fletcher Andrade MD

## 2018-01-28 LAB
GLUCOSE BLD STRIP.AUTO-MCNC: 111 MG/DL (ref 65–100)
SERVICE CMNT-IMP: ABNORMAL

## 2018-01-28 PROCEDURE — 74011250637 HC RX REV CODE- 250/637: Performed by: HOSPITALIST

## 2018-01-28 PROCEDURE — 77010033678 HC OXYGEN DAILY

## 2018-01-28 PROCEDURE — 82962 GLUCOSE BLOOD TEST: CPT

## 2018-01-28 PROCEDURE — 74011250637 HC RX REV CODE- 250/637: Performed by: INTERNAL MEDICINE

## 2018-01-28 PROCEDURE — 65660000000 HC RM CCU STEPDOWN

## 2018-01-28 PROCEDURE — 74011250636 HC RX REV CODE- 250/636: Performed by: HOSPITALIST

## 2018-01-28 RX ADMIN — DOCUSATE SODIUM 100 MG: 100 CAPSULE, LIQUID FILLED ORAL at 08:55

## 2018-01-28 RX ADMIN — HEPARIN SODIUM 5000 UNITS: 5000 INJECTION, SOLUTION INTRAVENOUS; SUBCUTANEOUS at 17:49

## 2018-01-28 RX ADMIN — Medication 10 ML: at 21:40

## 2018-01-28 RX ADMIN — NYSTATIN: 100000 POWDER TOPICAL at 08:55

## 2018-01-28 RX ADMIN — NYSTATIN: 100000 POWDER TOPICAL at 17:51

## 2018-01-28 RX ADMIN — FUROSEMIDE 40 MG: 40 TABLET ORAL at 08:55

## 2018-01-28 RX ADMIN — ACETAMINOPHEN 650 MG: 325 TABLET, FILM COATED ORAL at 02:52

## 2018-01-28 RX ADMIN — ATENOLOL 50 MG: 50 TABLET ORAL at 21:39

## 2018-01-28 RX ADMIN — HEPARIN SODIUM 5000 UNITS: 5000 INJECTION, SOLUTION INTRAVENOUS; SUBCUTANEOUS at 00:46

## 2018-01-28 RX ADMIN — OSELTAMIVIR PHOSPHATE 30 MG: 30 CAPSULE ORAL at 17:49

## 2018-01-28 RX ADMIN — DOCUSATE SODIUM 100 MG: 100 CAPSULE, LIQUID FILLED ORAL at 17:48

## 2018-01-28 RX ADMIN — HEPARIN SODIUM 5000 UNITS: 5000 INJECTION, SOLUTION INTRAVENOUS; SUBCUTANEOUS at 08:55

## 2018-01-28 RX ADMIN — ASPIRIN 81 MG: 81 TABLET, COATED ORAL at 08:55

## 2018-01-28 RX ADMIN — OSELTAMIVIR PHOSPHATE 30 MG: 30 CAPSULE ORAL at 10:36

## 2018-01-28 NOTE — PROGRESS NOTES
Hospitalist Progress Note    NAME: Thom Obrien   :  1925   MRN:  [id number]       Assessment / Plan:  Severe sepsis, POA   Likely due to Viral syndrome + Possible UTI POA  Acute hypoxic respiratory failure  86% RA, 95% 3L. Suspect due to sepsis  COPD, not in acute exacerbation  CXR neg  V/Q scan neg PE  Flu test neg  UA +ve  Urine Cx negative Final  Blood Cx remains neg x 3 days    Taper oxygen to off as able  DC IV Levaquin & vancomycin  Continue with tamiflu empirically for now- pt high risk  DC IVF  resumed atenolol and lasix now  PT/OT consult for DC planning-will need to go to health care section of [facility] on DC - CM consutled  fall precaution. PRACHI POA- resolved  Due to Dehydration POA- resolved  Cr 1.68-> 1.1    DC IVF  BMP in AM  S/p lasix x 1 yest PM  Resumed PO lasix    Sundowning/Delirium ( night)- resolved now    Haldol Prn now    Elevated trop 0.58 POA- now down to 0.3--  ekg looks like afib but exam seems more c/w with SR. V/Q scan low probability for PE. Troponin leak could be from sepsis or due to [facility]. Echo normal EF, no WMA    Cont aspirin 81mg daily,   Resumed atenolol   S/p Lasix x 1  PM    Hypovolemic hyponatremia Na 132 POA- now resolved  Hypokalemia 3.2 POA- resolved  Observe now    Generalized weakness due to severe sepsis, POA  Chronic left posterior calf ulcer since 2017. Wound does not appear infected  PAD involving lower legs and carotid artery per daughter  Cholelithiasis POA  Hx afib post hip ORIF . Not on anticoagulation    wound care consulted for chronic L post calf ulcer/wound      Code:    No advance directive. Does not want heroic measures.   DNR/DNI  DVT prophylaxis: heparin  Surrogate decision maker:  Silvia Albarado Sawyer cell [phone]    Recommended Disposition: SNF/LTC in 24 hrs if stable, CM consulted for DC planning     Subjective:     Chief Complaint / Reason for Physician Visit :F/U Fever, sepsis, Viral syndrome, chronic wounds, PRACHI, Sundowning  \"I feel much better\". Discussed with RN events overnight. Review of Systems:  Symptom Y/N Comments  Symptom Y/N Comments   Fever/Chills n afebrile  Chest Pain n    Poor Appetite n   Edema n    Cough n   Abdominal Pain n    Sputum n   Joint Pain n    SOB/ARREOLA n   Pruritis/Rash     Nausea/vomit    Tolerating PT/OT y    Diarrhea    Tolerating Diet y    Constipation    Other       Could NOT obtain due to:      Objective:     VITALS:   Last 24hrs VS reviewed since prior progress note. Most recent are:  Patient Vitals for the past 24 hrs:   Temp Pulse Resp BP SpO2   01/28/18 0329 97.8 °F (36.6 °C) (!) 58 16 142/56 96 %   01/27/18 2305 98 °F (36.7 °C) 83 20 157/68 92 %   01/27/18 2150 - 92 - 159/88 -   01/27/18 1913 97.6 °F (36.4 °C) 93 20 145/86 90 %   01/27/18 1542 97.8 °F (36.6 °C) 89 24 138/49 95 %   01/27/18 1229 - 84 - 155/70 -   01/27/18 1200 97.6 °F (36.4 °C) 84 20 155/70 97 %       Intake/Output Summary (Last 24 hours) at 01/28/18 1051  Last data filed at 01/28/18 0107   Gross per 24 hour   Intake                0 ml   Output              700 ml   Net             -700 ml        PHYSICAL EXAM:  General: WD, WN. Alert, cooperative, no acute distress, frail & old looking   EENT:  EOMI. Anicteric sclerae. MMM  Resp:  CTA bilaterally, no wheezing or rales. No accessory muscle use  CV:  Regular  rhythm,  No edema  GI:  Soft, Non distended, Non tender.  +Bowel sounds  Neurologic:  Alert and oriented X 3, normal speech,   Psych:   Good insight. Not anxious nor agitated  Skin:  No rashes.   No jaundice    Reviewed most current lab test results and cultures  YES  Reviewed most current radiology test results   YES  Review and summation of old records today    NO  Reviewed patient's current orders and MAR    YES  PMH/SH reviewed - no change compared to H&P  ________________________________________________________________________  Care Plan discussed with:    Comments   Patient x    Family  x Daughter at bedside   RN x    Care Manager     Consultant                        Multidiciplinary team rounds were held today with , nursing, pharmacist and clinical coordinator. Patient's plan of care was discussed; medications were reviewed and discharge planning was addressed. ________________________________________________________________________  Total NON critical care TIME:  16   Minutes    Total CRITICAL CARE TIME Spent:   Minutes non procedure based      Comments   >50% of visit spent in counseling and coordination of care     ________________________________________________________________________  Tiffani Tom MD     Procedures: see electronic medical records for all procedures/Xrays and details which were not copied into this note but were reviewed prior to creation of Plan. LABS:  I reviewed today's most current labs and imaging studies.   Pertinent labs include:  Recent Labs      01/27/18   0420  01/26/18   2100  01/26/18   0400   WBC  9.7  12.4*  9.1   HGB  13.1  13.5  13.1   HCT  40.5  42.4  41.0   PLT  142*  163  147*     Recent Labs      01/27/18   0420  01/26/18   2100  01/26/18   0400   NA  140  140  136   K  3.7  3.6  3.9   CL  108  109*  105   CO2  21  22  20*   GLU  104*  101*  111*   BUN  37*  38*  35*   CREA  1.10*  1.06*  1.19*   CA  8.6  7.9*  8.1*   MG   --    --   2.0   PHOS   --    --   3.5   ALB   --   2.7*  2.7*   TBILI   --   1.2*  1.1*   SGOT   --   47*  53*   ALT   --   28 26       Signed: Tiffani Tom MD

## 2018-01-28 NOTE — PROGRESS NOTES
PCU SHIFT NURSING NOTE      Bedside and Verbal shift change report given to [facility] GIRISH KENNEDY (oncoming nurse) by Candy Rodriguez RN (offgoing nurse). Report included the following information SBAR, Kardex, Intake/Output, MAR, Accordion, Recent Results, Med Rec Status, Cardiac Rhythm AFib and Alarm Parameters . Shift Summary:       Admission Date 1/25/2018   Admission Diagnosis Severe sepsis ([facility]  PRACHI (acute kidney injury) ([facility]  Acute respiratory failure with hypoxia (HCC)  Leg ulcer, left ([facility]   Consults None        Consults   []PT   []OT   []Speech   []Case Management      [] Palliative      Cardiac Monitoring Order   []Yes   []No     IV drips   []Yes    Drip:                            Dose:  Drip:                            Dose:  Drip:                            Dose:   []No     GI Prophylaxis   []Yes   []No         DVT Prophylaxis   SCDs:             Leonidas stockings:         [] Medication   []Contraindicated   []None      Activity Level Activity Level: Up with Assistance     Activity Assistance: Partial (two people)   Purposeful Rounding every 1-2 hour? []Yes   Griffith Score  Total Score: 4   Bed Alarm (If score 3 or >)   []Yes   [] Refused (See signed refusal form in chart)   Neel Score  Neel Score: 16   Neel Score (if score 14 or less)   []PMT consult   []Wound Care consult      []Specialty bed   [] Nutrition consult          Needs prior to discharge:   Home O2 required:    []Yes   []No    If yes, how much O2 required?     Other:    Last Bowel Movement: Last Bowel Movement Date: 01/25/18      Influenza Vaccine Received Flu Vaccine for Current Season (usually Sept-March): Yes        Pneumonia Vaccine           Diet Active Orders   Diet    DIET CARDIAC Soft Solids      LDAs               Peripheral IV 01/26/18 Right Arm (Active)   Site Assessment Clean, dry, & intact 1/27/2018  7:13 PM   Phlebitis Assessment 0 1/27/2018  7:13 PM   Infiltration Assessment 0 1/27/2018  7:13 PM   Dressing Status Clean, dry, & intact 1/27/2018  7:13 PM   Dressing Type Transparent 1/27/2018  7:13 PM   Hub Color/Line Status Blue;Flushed;Patent 1/27/2018  7:13 PM          External Female Catheter 01/26/18 (Active)   Site Assessment Clean, dry, & intact 1/27/2018  7:13 PM   Repositioned Yes 1/27/2018  7:13 PM   Perineal Care Yes 1/27/2018  7:13 PM   Wick Changed No 1/27/2018  7:13 PM   Suction Canister/Tubing Changed No 1/27/2018  7:13 PM   Urine Output (mL) 700 ml 1/28/2018  1:07 AM                Urinary Catheter      Intake & Output   Date 01/27/18 0700 - 01/28/18 0659 01/28/18 0700 - 01/29/18 0659   Shift 7864-2165 6191-2899 24 Hour Total 0157-8265 6692-9711 24 Hour Total   I  N  T  A  K  E   Shift Total  (mL/kg)         O  U  T  P  U  T   Urine  (mL/kg/hr)  700 700         Urine Output (mL) (External Female Catheter 01/26/18)  700 700       Shift Total  (mL/kg)  700  (7.8) 700  (7.8)      NET  -700 -700      Weight (kg) 89.5 89.5 89.5 89.5 89.5 89.5         Readmission Risk Assessment Tool Score Low Risk            8       Total Score        3 Has Seen PCP in Last 6 Months (Yes=3, No=0)    5 Pt. Coverage (Medicare=5 , Medicaid, or Self-Pay=4)        Criteria that do not apply:    . Living with Significant Other. Assisted Living. LTAC. SNF.  or   Rehab    Patient Length of Stay (>5 days = 3)    IP Visits Last 12 Months (1-3=4, 4=9, >4=11)    Charlson Comorbidity Score (Age + Comorbid Conditions)       Expected Length of Stay 4d 21h   Actual Length of Stay 3

## 2018-01-28 NOTE — PROGRESS NOTES
Bedside and Verbal shift change report given to oncoming nurse. Report included the following information SBAR, Kardex, Intake/Output, MAR and Recent Results.

## 2018-01-29 VITALS
WEIGHT: 200.9 LBS | HEART RATE: 71 BPM | TEMPERATURE: 97.3 F | HEIGHT: 68 IN | BODY MASS INDEX: 30.45 KG/M2 | DIASTOLIC BLOOD PRESSURE: 51 MMHG | SYSTOLIC BLOOD PRESSURE: 159 MMHG | RESPIRATION RATE: 20 BRPM | OXYGEN SATURATION: 96 %

## 2018-01-29 LAB
ANION GAP SERPL CALC-SCNC: 7 MMOL/L (ref 5–15)
BUN SERPL-MCNC: 27 MG/DL (ref 6–20)
BUN/CREAT SERPL: 29 (ref 12–20)
CALCIUM SERPL-MCNC: 8.5 MG/DL (ref 8.5–10.1)
CHLORIDE SERPL-SCNC: 105 MMOL/L (ref 97–108)
CO2 SERPL-SCNC: 29 MMOL/L (ref 21–32)
CREAT SERPL-MCNC: 0.94 MG/DL (ref 0.55–1.02)
GLUCOSE SERPL-MCNC: 142 MG/DL (ref 65–100)
POTASSIUM SERPL-SCNC: 3.4 MMOL/L (ref 3.5–5.1)
SODIUM SERPL-SCNC: 141 MMOL/L (ref 136–145)

## 2018-01-29 PROCEDURE — 77030019607 HC DSG BURN S&N -A

## 2018-01-29 PROCEDURE — 97116 GAIT TRAINING THERAPY: CPT | Performed by: PHYSICAL THERAPIST

## 2018-01-29 PROCEDURE — 74011250636 HC RX REV CODE- 250/636: Performed by: HOSPITALIST

## 2018-01-29 PROCEDURE — 36415 COLL VENOUS BLD VENIPUNCTURE: CPT | Performed by: INTERNAL MEDICINE

## 2018-01-29 PROCEDURE — 80048 BASIC METABOLIC PNL TOTAL CA: CPT | Performed by: INTERNAL MEDICINE

## 2018-01-29 PROCEDURE — 74011250637 HC RX REV CODE- 250/637: Performed by: HOSPITALIST

## 2018-01-29 PROCEDURE — 77010033678 HC OXYGEN DAILY

## 2018-01-29 PROCEDURE — 77030011255 HC DSG AQUACEL AG BMS -A

## 2018-01-29 PROCEDURE — 77030009526 HC GEL CARSYN MDII -A

## 2018-01-29 PROCEDURE — 74011250637 HC RX REV CODE- 250/637: Performed by: INTERNAL MEDICINE

## 2018-01-29 RX ORDER — POTASSIUM CHLORIDE 20 MEQ/1
20 TABLET, EXTENDED RELEASE ORAL DAILY
Status: DISCONTINUED | OUTPATIENT
Start: 2018-01-29 | End: 2018-01-29 | Stop reason: HOSPADM

## 2018-01-29 RX ORDER — OSELTAMIVIR PHOSPHATE 30 MG/1
30 CAPSULE ORAL 2 TIMES DAILY
Qty: 4 CAP | Refills: 0 | Status: SHIPPED
Start: 2018-01-29 | End: 2018-01-31

## 2018-01-29 RX ADMIN — HEPARIN SODIUM 5000 UNITS: 5000 INJECTION, SOLUTION INTRAVENOUS; SUBCUTANEOUS at 09:20

## 2018-01-29 RX ADMIN — ASPIRIN 81 MG: 81 TABLET, COATED ORAL at 09:20

## 2018-01-29 RX ADMIN — FUROSEMIDE 40 MG: 40 TABLET ORAL at 09:21

## 2018-01-29 RX ADMIN — OSELTAMIVIR PHOSPHATE 30 MG: 30 CAPSULE ORAL at 09:21

## 2018-01-29 RX ADMIN — NYSTATIN: 100000 POWDER TOPICAL at 09:21

## 2018-01-29 RX ADMIN — HEPARIN SODIUM 5000 UNITS: 5000 INJECTION, SOLUTION INTRAVENOUS; SUBCUTANEOUS at 01:11

## 2018-01-29 RX ADMIN — DOCUSATE SODIUM 100 MG: 100 CAPSULE, LIQUID FILLED ORAL at 09:20

## 2018-01-29 RX ADMIN — POTASSIUM CHLORIDE 20 MEQ: 20 TABLET, EXTENDED RELEASE ORAL at 09:20

## 2018-01-29 RX ADMIN — Medication 10 ML: at 05:28

## 2018-01-29 NOTE — PROGRESS NOTES
CM Initial Assessment        PMHX--  Significant for copd, gallstones and htn. Current admission assessment-- Patient came to ed with chief complaint of sob and fatigue. The daughter reports minor changes in patient's mental status. Patient had fever of 101 and sats were 86% on room air when she came to ed. Patient lives at [facility]. She states she was independent in adl's prior to coming to the hospital. She uses a rollator to go to the dining area or long distances. She denies using oxygen at [facility]. She states she has been to [facility] since Dec of 2015. Her daughter is in the room with her at this time. The plan is for her to go to the healthcare section when discharged and patient and daughter are in agreement to this. Spoke with Colletta Puls at [facility] who is the  [phone]) and she has a bed available for patient at the healthcare section if patient is ready for discharge today. Referral sent to Colletta Puls in allscripts. Care Management Interventions  PCP Verified by CM: Yes  Transition of Care Consult (CM Consult): Discharge Planning  Current Support Network:  Other (Lives at [facility]. )  Confirm Follow Up Transport: Family  Plan discussed with Pt/Family/Caregiver: Yes  Discharge Location  Discharge Placement: Other:                    Darby [id number]

## 2018-01-29 NOTE — PROGRESS NOTES
Spoke with Dr Michele Maldonado and he has seen patient and patient is being discharged to [facility] healthcare section today. Patient and daughter aware.

## 2018-01-29 NOTE — PROGRESS NOTES
Bedside and Verbal shift change report received from [address]. Report received  with SBAR, Kardex, ED Summary, OR Summary, Procedure Summary, Intake/Output, MAR, Accordion and Recent Results. Pt received awake alert sitting up in the chair. Pt breaths well on VM/NC. abd soft. Pt has dressing over L shin close to ankle. Pt has scattered bruising on her body. Feet warm pedal pulses present. 0900 family at the bed side. Pt tolerate po well. 1030 pt remains sitting up in the chair. Pt on 3L NC now.  at the bed side. 1130 pt on RA, sats >94%. Pt ambulated to BR gait steady noted. Pt denies Sob or discomfort. 1230 pt and her daughter had discharge instruction, verbalized understanding of it. All  questions were answered. Transportation set up around [phone] today. 1240 TRANSFER - OUT REPORT:    Verbal report given to [facility] on [facility] Harjinder  being transferred to [facility]  for routine progression of care       Report consisted of patients Situation, Background, Assessment and   Recommendations(SBAR). Information from the following report(s) SBAR, Kardex, ED Summary, OR Summary, Procedure Summary, Accordion, Recent Results, Med Rec Status and Cardiac Rhythm A fib was reviewed with the receiving nurse. Opportunity for questions and clarification was provided. 1415 pt d/c to the d/c lobby. Pt had all her belongings with her.

## 2018-01-29 NOTE — PROGRESS NOTES
Spiritual Care Assessment/Progress Notes    Morales Lugo [id number]  [id number]    4/30/1925  80 y.o.  female    Patient Telephone Number: [phone] (home)   Denominational Affiliation: Bib Crenshaw   Language: English   Extended Emergency Contact Information  Primary Emergency Contact: Hilton Lee Phone: [phone]  Mobile Phone: [phone]  Relation: Child  Secondary Emergency Contact: [address] Phone: [phone]  Relation: Child   Patient Active Problem List    Diagnosis Date Noted    Viral syndrome 01/26/2018    Severe sepsis ([facility] 01/25/2018    PRACHI (acute kidney injury) ([facility] 01/25/2018    Acute respiratory failure with hypoxia ([facility] 01/25/2018    Leg ulcer, left ([facility] 01/25/2018    SOB (shortness of breath) 04/02/2016    A-fib ([facility] 04/02/2016    Fracture of femoral neck, right ([facility] 12/03/2015    Hip fracture ([facility] 12/03/2015        Date: 1/29/2018       Level of Denominational/Spiritual Activity:  [x]         Involved in chang tradition/spiritual practice    []         Not involved in chang tradition/spiritual practice  [x]         Spiritually oriented    []         Claims no spiritual orientation    []         seeking spiritual identity  []         Feels alienated from Rastafarian practice/tradition  []         Feels angry about Rastafarian practice/tradition  [x]         Spirituality/Rastafarian tradition is a resource for coping at this time.   []         Not able to assess due to medical condition    Services Provided Today:  []         crisis intervention    []         reading Scriptures  [x]         spiritual assessment    [x]         prayer  [x]         empathic listening/emotional support  []         rites and rituals (cite in comments)  []         life review     []         Rastafarian support  []         theological development    []         advocacy  []         ethical dialog     []         blessing  []         bereavement support    [x]         support to family  []         anticipatory grief support   []         help with AMD  []         spiritual guidance    []         meditation      Spiritual Care Needs  []         Emotional Support  []         Spiritual/Congregation Care  []         Loss/Adjustment  []         Advocacy/Referral                /Ethics  [x]         No needs expressed at               this time  []         Other: (note in               comments)  [address]  []         Follow up visits with               pt/family  []         Provide materials  []         Schedule sacraments  []         Contact Community               Clergy  [x]         Follow up as needed  []         Other: (note in               comments)     Comments:   Supportive visit on PCU for spiritual assessment. Patient's daughter was present. Provided listening presence as patient shared her hopes of going home soon. She shared she is feeling far better than when she came to the hospital.  She lives at [facility] and is very involved in their activities. Mrs. Thompson also attends chapel services regularly. She was raised Caodaism and later attended a [employer]. Offered assurance of prayer. Advised of availability of pastoral support for any future needs.     RADHA Bowling, [facility], [address] [place] [address] Paging Service  [phone])

## 2018-01-29 NOTE — PROGRESS NOTES
Problem: Mobility Impaired (Adult and Pediatric)  Goal: *Acute Goals and Plan of Care (Insert Text)  Physical Therapy Goals  Initiated 1/26/2018  1. Patient will move from supine to sit and sit to supine , scoot up and down and roll side to side in bed with supervision/set-up within 7 day(s). 2.  Patient will transfer from bed to chair and chair to bed with supervision/set-up using the least restrictive device within 7 day(s). 3.  Patient will perform sit to stand with supervision/set-up within 7 day(s). 4.  Patient will ambulate with supervision/set-up for 100 feet with the least restrictive device within 7 day(s). physical Therapy TREATMENT  Patient: Dar Gregory (55 y.o. female)  Date: 1/29/2018  Diagnosis: Severe sepsis ([facility]  PRACHI (acute kidney injury) ([facility]  Acute respiratory failure with hypoxia (HCC)  Leg ulcer, left ([facility] <principal problem not specified>       Precautions:    Chart, physical therapy assessment, plan of care and goals were reviewed. ASSESSMENT:  Pt continues to make steady progress toward goals. Pt received seated in chair, on 3L O2/min, daughter present, and willing to participate with therapy. Pt reports not using O2 at baseline and VSS off of O2. Pt modified independent with transferring sit<>stand but requires extra time and multiple attempts. Pt insistent on doing independently though utilized CGA to facilitate transfers and requires Kaylah to correct immediate standing balance. Pt ambulates 15' with RW on RA and CGA, though requires Kaylah to manage walker during turns. Pt returns to chair with mild SOB after activity. Pt SPO2 decreased to 88% with activity and takes ~1min to recover to 95% on 4L O2. Pt educated on the importance of monitoring activity tolerance when ambulating. Pt left on 3L O2 while seated in chair, needs met. Continue to recommend DC to SNF maximize mobility progression.   Progression toward goals:  [x]      Improving appropriately and progressing toward goals  []      Improving slowly and progressing toward goals  []      Not making progress toward goals and plan of care will be adjusted     PLAN:  Patient continues to benefit from skilled intervention to address the above impairments. Continue treatment per established plan of care. Discharge Recommendations:  Skilled Nursing Facility  Further Equipment Recommendations for Discharge:  None has rollator     SUBJECTIVE:   Patient stated I like to do things myself.     OBJECTIVE DATA SUMMARY:   Critical Behavior:  Neurologic State: Alert  Orientation Level: Oriented X4  Cognition: Follows commands     Functional Mobility Training:  Bed Mobility:                     Transfers:  Sit to Stand: Contact guard assistance  Stand to Sit: Contact guard assistance                             Balance:  Sitting: Intact  Standing: Impaired; With support  Standing - Static: Fair;Constant support  Standing - Dynamic : Fair  Ambulation/Gait Training:  Distance (ft): 15 Feet (ft)  Assistive Device: Walker, rolling;Gait belt  Ambulation - Level of Assistance: Assist x1;Minimal assistance        Gait Abnormalities: Shuffling gait; Decreased step clearance        Base of Support: Narrowed     Speed/Yamilex: Shuffled; Slow  Step Length: Left shortened;Right shortened                        Pain:  Pain Scale 1: Numeric (0 - 10)  Pain Intensity 1: 0              Activity Tolerance:   Fair: ambulates 15' with RW on RA and CGA>Kaylah  Please refer to the flowsheet for vital signs taken during this treatment.   After treatment:   [x] Patient left in no apparent distress sitting up in chair  [] Patient left in no apparent distress in bed  [x] Call bell left within reach  [x] Nursing notified  [x] Caregiver present  [] Bed alarm activated    COMMUNICATION/COLLABORATION:   The patients plan of care was discussed with: Physical Therapist and Registered Nurse    Vianney Summers   Time Calculation: 19 mins     Regarding student involvement in patient care:  A student participated in this treatment session. Per CMS Medicare statements and APTA guidelines I certify that the following was true:  1. I was present and directly observed the entire session. 2. I made all skilled judgments and clinical decisions regarding care. 3. I am the practitioner responsible for assessment, treatment, and documentation.

## 2018-01-29 NOTE — PROGRESS NOTES
PCU SHIFT NURSING NOTE      Bedside and Verbal shift change report given to [facility] GIRISH KENNEDY (oncoming nurse) by Cat Vyas RN (offgoing nurse). Report included the following information SBAR, Kardex, Intake/Output, MAR, Accordion, Recent Results, Med Rec Status, Cardiac Rhythm AFib and Alarm Parameters . Shift Summary:     0600 - Daily wound care completed. Admission Date 1/25/2018   Admission Diagnosis Severe sepsis ([facility]  [facility]  Acute respiratory failure with hypoxia (HCC)  Leg ulcer, left ([facility]   Consults None        Consults   []PT   []OT   []Speech   []Case Management      [] Palliative      Cardiac Monitoring Order   []Yes   []No     IV drips   []Yes    Drip:                            Dose:  Drip:                            Dose:  Drip:                            Dose:   []No     GI Prophylaxis   []Yes   []No         DVT Prophylaxis   SCDs:             Leonidas stockings:         [] Medication   []Contraindicated   []None      Activity Level Activity Level: Up with Assistance     Activity Assistance: Partial (two people)   Purposeful Rounding every 1-2 hour? []Yes   Griffith Score  Total Score: 3   Bed Alarm (If score 3 or >)   []Yes   [] Refused (See signed refusal form in chart)   Neel Score  Neel Score: 18   Neel Score (if score 14 or less)   []PMT consult   []Wound Care consult      []Specialty bed   [] Nutrition consult          Needs prior to discharge:   Home O2 required:    []Yes   []No    If yes, how much O2 required?     Other:    Last Bowel Movement: Last Bowel Movement Date: 01/25/18      Influenza Vaccine Received Flu Vaccine for Current Season (usually Sept-March): Yes        Pneumonia Vaccine           Diet Active Orders   Diet    DIET CARDIAC Soft Solids      LDAs               Peripheral IV 01/26/18 Right Arm (Active)   Site Assessment Clean, dry, & intact 1/28/2018  7:38 PM   Phlebitis Assessment 0 1/28/2018  7:38 PM   Infiltration Assessment 0 1/28/2018  7:38 PM Dressing Status Clean, dry, & intact 1/28/2018  7:38 PM   Dressing Type Transparent;Tape 1/28/2018  7:38 PM   Hub Color/Line Status Patent 1/28/2018  7:38 PM          External Female Catheter 01/26/18 (Active)   Site Assessment Clean, dry, & intact 1/28/2018  7:38 PM   Repositioned Yes 1/28/2018  7:38 PM   Perineal Care Yes 1/28/2018  7:38 PM   Wick Changed Yes 1/28/2018  7:38 PM   Suction Canister/Tubing Changed Yes 1/28/2018  7:38 PM   Urine Output (mL) 700 ml 1/28/2018  1:07 AM                Urinary Catheter      Intake & Output   Date 01/27/18 1900 - 01/28/18 0659 01/28/18 0700 - 01/29/18 0659   Shift 9690-2293 24 Hour Total 6433-2192 5751-5663 24 Hour Total   I  N  T  A  K  E   P.O.   240  240      P. O.   240  240    Shift Total  (mL/kg)   240  (2.7)  240  (2.7)   O  U  T  P  U  T   Urine  (mL/kg/hr) 700 700         Urine Output (mL) (External Female Catheter 01/26/18) 700 700       Shift Total  (mL/kg) 700  (7.7) 700  (7.7)      NET -700 -700 240  240   Weight (kg) 90.4 90.4 90.4 90.4 90.4         Readmission Risk Assessment Tool Score Low Risk            8       Total Score        3 Has Seen PCP in Last 6 Months (Yes=3, No=0)    5 Pt. Coverage (Medicare=5 , Medicaid, or Self-Pay=4)        Criteria that do not apply:    . Living with Significant Other. Assisted Living. LTAC. SNF.  or   Rehab    Patient Length of Stay (>5 days = 3)    IP Visits Last 12 Months (1-3=4, 4=9, >4=11)    Charlson Comorbidity Score (Age + Comorbid Conditions)       Expected Length of Stay 4d 21h   Actual Length of Stay 3

## 2018-01-29 NOTE — DISCHARGE INSTRUCTIONS
HOSPITALIST DISCHARGE INSTRUCTIONS    NAME: Kaleb Butcher   :  1925   MRN:  [id number]     Date/Time:  2018 12:00 PM    ADMIT DATE: 2018     DISCHARGE DATE: 2018     DISCHARGE DIAGNOSIS:  Severe sepsis, POA - resolved  Likely due to Viral syndrome - Flu test neg bu pt high risk-- complete 2 more days of Tamiflu  Possible UTI POA- ruled out with neg urine Cx  Acute hypoxic respiratory failure  86% RA, 95% 3L.  Suspect due to sepsis, taper off oxygen as able  COPD, not in acute exacerbation  PRACHI POA- resolved  Due to Dehydration POA- resolved  Hypovolemic hyponatremia Na 132 POA- now resolved  Hypokalemia 3.2 POA- cont PO K+ rich food with Daily lasix as instructed  owning/Delirium ( night)- resolved now  Elevated trop 0.58 POA- resolved, likely due to sepsis/PRACHI, echo normal  Generalized weakness due to severe sepsis, POA  Chronic left posterior calf ulcer since 2017.  Wound does not appear infected, cont general wound care as before  PAD involving lower legs and carotid artery per daughter  Cholelithiasis POA  Hx afib post hip ORIF     Active Problems:    Severe sepsis ([address] (2018)      PRACHI (acute kidney injury) ([address] (2018)      Acute respiratory failure with hypoxia ([address] (2018)      Leg ulcer, left ([address] (2018)      Viral syndrome (2018)         MEDICATIONS:  As per medication reconciliation  list  · It is important that you take the medication exactly as they are prescribed. · Keep your medication in the bottles provided by the pharmacist and keep a list of the medication names, dosages, and times to be taken in your wallet. · Do not take other medications without consulting your doctor.      Pain Management: per above medications    What to do at Home    Recommended diet:  Cardiac Diet    Recommended activity: Activity as tolerated    If you have questions regarding the hospital related prescriptions or hospital related issues please call [facility] . If you experience any of the following symptoms then please call your primary care physician or return to the emergency room if you cannot get hold of your doctor:  Fever, chills, nausea, vomiting, diarrhea, change in mentation, falling, bleeding, shortness of breath,     Follow Up:  Dr. Hillary Thomas MD  you are to call and set up an appointment to see them in 7-10 days. Information obtained by :  I understand that if any problems occur once I am at home I am to contact my physician. I understand and acknowledge receipt of the instructions indicated above.                                                                                                                                            Physician's or R.N.'s Signature                                                                  Date/Time                                                                                                                                              Patient or Representative Signature                                                          Date/Time

## 2018-01-29 NOTE — DISCHARGE SUMMARY
Hospitalist Discharge Summary     Patient ID:  Jose Munson  [id number]  28 y.o.  4/30/1925    PCP on record: Millie Marin MD    Admit date: 1/25/2018  Discharge date and time: 1/29/2018      DISCHARGE DIAGNOSIS:    Severe sepsis, POA - resolved  Likely due to Viral syndrome - Flu test neg bu pt high risk-- complete 2 more days of Tamiflu  Possible UTI POA- ruled out with neg urine Cx  Acute hypoxic respiratory failure  86% RA, 95% 3L.  Suspect due to sepsis, taper off oxygen as able  COPD, not in acute exacerbation  PRACHI POA- resolved  Due to Dehydration POA- resolved  Hypovolemic hyponatremia Na 132 POA- now resolved  Hypokalemia 3.2 POA- cont PO K+ rich food with Daily lasix as instructed  Sundowning/Delirium (1/26 night)- resolved now  Elevated trop 0.58 POA- resolved, likely due to sepsis/PRACHI, echo normal  Generalized weakness due to severe sepsis, POA  Chronic left posterior calf ulcer since 11/2017.  Wound does not appear infected, cont general wound care as before  PAD involving lower legs and carotid artery per daughter  Cholelithiasis POA  Hx afib post hip ORIF 2015      CONSULTATIONS:  None    Excerpted HPI from H&P of Benjamin Smiley MD:  Alex.[age] y.o.  female with copd, htn, gallstones, arthritis, presented from [facility] by EMS for not been feeling well for 1 week. Tested negative for flu. Yesterday assisted living doctor called daughter reporting patient with high fever and tachycardia, and felt patient not safe to spend night alone so daughter stayed with her. CXR done last night but no report. Overnight had some episodes of delirium/confusion, alternating from chills to drenched with sweat and very weak, needed assistance to get up to bathroom, poor appetite last night. Started on levofloxacin 750mg last night by pcp.     Patient reports generalized fatigue, SOB and decreased urination.   Denies cough, wheezing, CP, abdominal pain, n/v/diarrhea. No bleeding. No dysuria. Has chronic left posterior calf ulcer since Halloween when someone's rollator ran into her and been going to wound care clinic. Has chronic discoloration to left leg that is not changed. Has pain in left leg but denies it is worse than baseline. Hx of ulcer in right leg that took 1 year to heal.\"    ______________________________________________________________________  DISCHARGE SUMMARY/HOSPITAL COURSE:  for full details see H&P, daily progress notes, labs, consult notes.      Severe sepsis, POA   Likely due to Viral syndrome + Possible UTI POA  Acute hypoxic respiratory failure  86% RA, 95% 3L.  Suspect due to sepsis  COPD, not in acute exacerbation  CXR neg  V/Q scan neg PE  Flu test neg  UA +ve  Urine Cx negative Final  Blood Cx remains neg x 3 days     Taper oxygen to off as able  DC IV Levaquin & vancomycin  Continue with tamiflu empirically for now- pt high risk  DC IVF  resumed atenolol and lasix now  PT/OT consult for DC planning-will need to go to health care section of [facility] on DC - CM consutled  fall precaution.     PRACHI POA- resolved  Due to Dehydration POA- resolved  Cr 1.68-> 1.1     DC IVF  BMP in AM  S/p lasix x 1 yest PM  Resumed PO lasix     Sundowning/Delirium (1/26 night)- resolved now     Haldol Prn now     Elevated trop 0.58 POA- now down to 0.3--  ekg looks like afib but exam seems more c/w with SR.  V/Q scan low probability for PE.  Troponin leak could be from sepsis or due to prachi.  Echo normal EF, no WMA     Cont aspirin 81mg daily,   Resumed atenolol   S/p Lasix x 1 1/26 PM     Hypovolemic hyponatremia Na 132 POA- now resolved  Hypokalemia 3.2 POA- resolved  Observe now     Generalized weakness due to severe sepsis, POA  Chronic left posterior calf ulcer since 11/2017.  Wound does not appear infected  PAD involving lower legs and carotid artery per daughter  Cholelithiasis POA  Hx afib post hip ORIF 2015.  Not on anticoagulation     wound care consulted for chronic L post calf ulcer/wound        Code:    No advance directive.  Does not want heroic measures.  DNR/DNI        _______________________________________________________________________  Patient seen and examined by me on discharge day. Pertinent Findings:  Gen:    Not in distress  Chest: Clear lungs  CVS:   Regular rhythm. No edema  Abd:  Soft, not distended, not tender  Neuro:  Alert, oriented x 3  _______________________________________________________________________  DISCHARGE MEDICATIONS:   Current Discharge Medication List      START taking these medications    Details   oseltamivir (TAMIFLU) 30 mg capsule Take 1 Cap by mouth two (2) times a day for 2 days. Qty: 4 Cap, Refills: 0         CONTINUE these medications which have NOT CHANGED    Details   acetaminophen (TYLENOL) 500 mg tablet Take 1,000 mg by mouth nightly. atenolol (TENORMIN) 50 mg tablet TAKE 1 TABLET BY MOUTH AT BEDTIME  Qty: 90 Tab, Refills: 3      furosemide (LASIX) 40 mg tablet TAKE ONE TABLET BY MOUTH DAILY  Qty: 30 Tab, Refills: 0         STOP taking these medications       levoFLOXacin (LEVAQUIN) 750 mg tablet Comments:   Reason for Stopping:               My Recommended Diet, Activity, Wound Care, and follow-up labs are listed in the patient's Discharge Insturctions which I have personally completed and reviewed.     ______________________________________________________________________    Risk of deterioration: Low    Condition at Discharge:  Stable  ______________________________________________________________________    Disposition  SNF/LTC  ______________________________________________________________________    Care Plan discussed with:   Patient, Family, RN, Care Manager    ______________________________________________________________________    Code Status: DNR/DNI  ______________________________________________________________________      Follow up with:   PCP : Emily Tolentino, MD  Follow-up Information Follow up With Details Comments Rivas Agrawal MD   [address] [place] [address]  [phone]                Total time in minutes spent coordinating this discharge (includes going over instructions, follow-up, prescriptions, and preparing report for sign off to her PCP) :  36 minutes    Signed:  Barry Nobles MD

## 2018-01-29 NOTE — PROGRESS NOTES
Manolo Reyes from [employer] called back and they can accept patient today to their healthcare section. [facility] [facility] has transportation and will be picking the patient up from [facility]. Per Manolo Reyes states she will call me with a time they will be coming to pick the patient up. Discharge summary faxed to [facility] at [phone].

## 2018-01-29 NOTE — PROGRESS NOTES
Per Arlene Otero  from [facility] states they will pick the patient up at 1415pm today. Nursing and family aware. Transportation has the telephone number for the nurses station and will call when they arrive so staff may bring patient down to the hospital lobby. Nursing to call report to [phone] and the nurses name is Jaskaran. Patient will be going to room 9343.

## 2018-01-31 LAB
BACTERIA SPEC CULT: NORMAL
BACTERIA SPEC CULT: NORMAL
SERVICE CMNT-IMP: NORMAL
SERVICE CMNT-IMP: NORMAL

## 2019-04-05 NOTE — PROGRESS NOTES
Physical Therapy    Received PT eval order. Pt off the floor for stat VQ scan. Will follow and see when available and able to tolerate session.     Osmin De León, PT No

## 2021-01-08 ENCOUNTER — APPOINTMENT (OUTPATIENT)
Dept: CT IMAGING | Age: 86
DRG: 871 | End: 2021-01-08
Attending: EMERGENCY MEDICINE
Payer: MEDICARE

## 2021-01-08 ENCOUNTER — HOSPITAL ENCOUNTER (INPATIENT)
Age: 86
LOS: 4 days | Discharge: HOME HEALTH | DRG: 871 | End: 2021-01-12
Attending: EMERGENCY MEDICINE | Admitting: GENERAL ACUTE CARE HOSPITAL
Payer: MEDICARE

## 2021-01-08 ENCOUNTER — APPOINTMENT (OUTPATIENT)
Dept: GENERAL RADIOLOGY | Age: 86
DRG: 871 | End: 2021-01-08
Attending: EMERGENCY MEDICINE
Payer: MEDICARE

## 2021-01-08 DIAGNOSIS — E87.20 LACTIC ACIDOSIS: ICD-10-CM

## 2021-01-08 DIAGNOSIS — I65.23 BILATERAL CAROTID ARTERY STENOSIS: ICD-10-CM

## 2021-01-08 DIAGNOSIS — I67.89 CEREBRAL MICROVASCULAR DISEASE: ICD-10-CM

## 2021-01-08 DIAGNOSIS — A41.9 SEVERE SEPSIS (HCC): Primary | ICD-10-CM

## 2021-01-08 DIAGNOSIS — I21.4 NSTEMI (NON-ST ELEVATED MYOCARDIAL INFARCTION) (HCC): ICD-10-CM

## 2021-01-08 DIAGNOSIS — R65.20 SEVERE SEPSIS (HCC): Primary | ICD-10-CM

## 2021-01-08 PROBLEM — R41.0 CONFUSION: Status: ACTIVE | Noted: 2021-01-08

## 2021-01-08 PROBLEM — B34.9 VIRAL SYNDROME: Status: RESOLVED | Noted: 2018-01-26 | Resolved: 2021-01-08

## 2021-01-08 PROBLEM — R77.8 ELEVATED TROPONIN: Status: ACTIVE | Noted: 2021-01-08

## 2021-01-08 PROBLEM — N63.10 BREAST MASS, RIGHT: Chronic | Status: ACTIVE | Noted: 2021-01-08

## 2021-01-08 PROBLEM — L97.929 LEG ULCER, LEFT (HCC): Status: RESOLVED | Noted: 2018-01-25 | Resolved: 2021-01-08

## 2021-01-08 PROBLEM — J96.01 ACUTE RESPIRATORY FAILURE WITH HYPOXIA (HCC): Status: RESOLVED | Noted: 2018-01-25 | Resolved: 2021-01-08

## 2021-01-08 PROBLEM — R47.81 SLURRED SPEECH: Status: ACTIVE | Noted: 2021-01-08

## 2021-01-08 PROBLEM — R50.9 FEVER: Status: ACTIVE | Noted: 2021-01-08

## 2021-01-08 LAB
ALBUMIN SERPL-MCNC: 3.6 G/DL (ref 3.5–5)
ALBUMIN/GLOB SERPL: 0.7 {RATIO} (ref 1.1–2.2)
ALP SERPL-CCNC: 98 U/L (ref 45–117)
ALT SERPL-CCNC: 17 U/L (ref 12–78)
ANION GAP SERPL CALC-SCNC: 8 MMOL/L (ref 5–15)
APTT PPP: 33 SEC (ref 22.1–31)
APTT PPP: 34.7 SEC (ref 22.1–31)
AST SERPL-CCNC: 34 U/L (ref 15–37)
ATRIAL RATE: 120 BPM
BASOPHILS # BLD: 0 K/UL (ref 0–0.1)
BASOPHILS NFR BLD: 0 % (ref 0–1)
BILIRUB SERPL-MCNC: 1.7 MG/DL (ref 0.2–1)
BUN SERPL-MCNC: 28 MG/DL (ref 6–20)
BUN/CREAT SERPL: 17 (ref 12–20)
CALCIUM SERPL-MCNC: 9 MG/DL (ref 8.5–10.1)
CALCULATED R AXIS, ECG10: -83 DEGREES
CALCULATED T AXIS, ECG11: 76 DEGREES
CHLORIDE SERPL-SCNC: 102 MMOL/L (ref 97–108)
CO2 SERPL-SCNC: 27 MMOL/L (ref 21–32)
COVID-19 RAPID TEST, COVR: NOT DETECTED
CREAT SERPL-MCNC: 1.67 MG/DL (ref 0.55–1.02)
D DIMER PPP FEU-MCNC: 3.66 MG/L FEU (ref 0–0.65)
DIAGNOSIS, 93000: NORMAL
DIFFERENTIAL METHOD BLD: ABNORMAL
EOSINOPHIL # BLD: 0 K/UL (ref 0–0.4)
EOSINOPHIL NFR BLD: 0 % (ref 0–7)
ERYTHROCYTE [DISTWIDTH] IN BLOOD BY AUTOMATED COUNT: 15.7 % (ref 11.5–14.5)
FIBRINOGEN PPP-MCNC: 552 MG/DL (ref 200–475)
GLOBULIN SER CALC-MCNC: 5.4 G/DL (ref 2–4)
GLUCOSE SERPL-MCNC: 163 MG/DL (ref 65–100)
HCT VFR BLD AUTO: 44.2 % (ref 35–47)
HEALTH STATUS, XMCV2T: NORMAL
HGB BLD-MCNC: 14.3 G/DL (ref 11.5–16)
IMM GRANULOCYTES # BLD AUTO: 0.1 K/UL (ref 0–0.04)
IMM GRANULOCYTES NFR BLD AUTO: 1 % (ref 0–0.5)
INR PPP: 2.4 (ref 0.9–1.1)
LACTATE BLD-SCNC: 3.3 MMOL/L (ref 0.4–2)
LACTATE BLD-SCNC: 3.63 MMOL/L (ref 0.4–2)
LDH SERPL L TO P-CCNC: 249 U/L (ref 81–246)
LYMPHOCYTES # BLD: 1.7 K/UL (ref 0.8–3.5)
LYMPHOCYTES NFR BLD: 12 % (ref 12–49)
MAGNESIUM SERPL-MCNC: 2.6 MG/DL (ref 1.6–2.4)
MCH RBC QN AUTO: 28.1 PG (ref 26–34)
MCHC RBC AUTO-ENTMCNC: 32.4 G/DL (ref 30–36.5)
MCV RBC AUTO: 87 FL (ref 80–99)
MONOCYTES # BLD: 1.4 K/UL (ref 0–1)
MONOCYTES NFR BLD: 10 % (ref 5–13)
NEUTS SEG # BLD: 10.8 K/UL (ref 1.8–8)
NEUTS SEG NFR BLD: 77 % (ref 32–75)
NRBC # BLD: 0 K/UL (ref 0–0.01)
NRBC BLD-RTO: 0 PER 100 WBC
PLATELET # BLD AUTO: 203 K/UL (ref 150–400)
PMV BLD AUTO: 10.9 FL (ref 8.9–12.9)
POTASSIUM SERPL-SCNC: 3.5 MMOL/L (ref 3.5–5.1)
PROCALCITONIN SERPL-MCNC: 0.33 NG/ML
PROT SERPL-MCNC: 9 G/DL (ref 6.4–8.2)
PROTHROMBIN TIME: 23.9 SEC (ref 9–11.1)
Q-T INTERVAL, ECG07: 356 MS
QRS DURATION, ECG06: 122 MS
QTC CALCULATION (BEZET), ECG08: 484 MS
RBC # BLD AUTO: 5.08 M/UL (ref 3.8–5.2)
SODIUM SERPL-SCNC: 137 MMOL/L (ref 136–145)
SOURCE, COVRS: NORMAL
SPECIMEN SOURCE, FCOV2M: NORMAL
SPECIMEN TYPE, XMCV1T: NORMAL
THERAPEUTIC RANGE,PTTT: ABNORMAL SECS (ref 58–77)
THERAPEUTIC RANGE,PTTT: ABNORMAL SECS (ref 58–77)
TROPONIN I SERPL-MCNC: 1.02 NG/ML
TROPONIN I SERPL-MCNC: 2.28 NG/ML
VENTRICULAR RATE, ECG03: 111 BPM
WBC # BLD AUTO: 14.1 K/UL (ref 3.6–11)

## 2021-01-08 PROCEDURE — 83605 ASSAY OF LACTIC ACID: CPT

## 2021-01-08 PROCEDURE — 65660000000 HC RM CCU STEPDOWN

## 2021-01-08 PROCEDURE — 71275 CT ANGIOGRAPHY CHEST: CPT

## 2021-01-08 PROCEDURE — 87040 BLOOD CULTURE FOR BACTERIA: CPT

## 2021-01-08 PROCEDURE — 71045 X-RAY EXAM CHEST 1 VIEW: CPT

## 2021-01-08 PROCEDURE — 84484 ASSAY OF TROPONIN QUANT: CPT

## 2021-01-08 PROCEDURE — 70450 CT HEAD/BRAIN W/O DYE: CPT

## 2021-01-08 PROCEDURE — 74177 CT ABD & PELVIS W/CONTRAST: CPT

## 2021-01-08 PROCEDURE — 85384 FIBRINOGEN ACTIVITY: CPT

## 2021-01-08 PROCEDURE — 85025 COMPLETE CBC W/AUTO DIFF WBC: CPT

## 2021-01-08 PROCEDURE — 74011250637 HC RX REV CODE- 250/637: Performed by: EMERGENCY MEDICINE

## 2021-01-08 PROCEDURE — 96374 THER/PROPH/DIAG INJ IV PUSH: CPT

## 2021-01-08 PROCEDURE — 74011000258 HC RX REV CODE- 258: Performed by: EMERGENCY MEDICINE

## 2021-01-08 PROCEDURE — 93005 ELECTROCARDIOGRAM TRACING: CPT

## 2021-01-08 PROCEDURE — 65270000029 HC RM PRIVATE

## 2021-01-08 PROCEDURE — 74011250636 HC RX REV CODE- 250/636: Performed by: EMERGENCY MEDICINE

## 2021-01-08 PROCEDURE — 87077 CULTURE AEROBIC IDENTIFY: CPT

## 2021-01-08 PROCEDURE — 80053 COMPREHEN METABOLIC PANEL: CPT

## 2021-01-08 PROCEDURE — 83615 LACTATE (LD) (LDH) ENZYME: CPT

## 2021-01-08 PROCEDURE — 96375 TX/PRO/DX INJ NEW DRUG ADDON: CPT

## 2021-01-08 PROCEDURE — 36415 COLL VENOUS BLD VENIPUNCTURE: CPT

## 2021-01-08 PROCEDURE — 81001 URINALYSIS AUTO W/SCOPE: CPT

## 2021-01-08 PROCEDURE — 85379 FIBRIN DEGRADATION QUANT: CPT

## 2021-01-08 PROCEDURE — 85730 THROMBOPLASTIN TIME PARTIAL: CPT

## 2021-01-08 PROCEDURE — 83735 ASSAY OF MAGNESIUM: CPT

## 2021-01-08 PROCEDURE — 87186 SC STD MICRODIL/AGAR DIL: CPT

## 2021-01-08 PROCEDURE — 84145 PROCALCITONIN (PCT): CPT

## 2021-01-08 PROCEDURE — 87086 URINE CULTURE/COLONY COUNT: CPT

## 2021-01-08 PROCEDURE — 87635 SARS-COV-2 COVID-19 AMP PRB: CPT

## 2021-01-08 PROCEDURE — 74011000636 HC RX REV CODE- 636: Performed by: EMERGENCY MEDICINE

## 2021-01-08 PROCEDURE — 85610 PROTHROMBIN TIME: CPT

## 2021-01-08 PROCEDURE — 99285 EMERGENCY DEPT VISIT HI MDM: CPT

## 2021-01-08 RX ORDER — ACETAMINOPHEN 650 MG/1
650 SUPPOSITORY RECTAL
Status: DISCONTINUED | OUTPATIENT
Start: 2021-01-08 | End: 2021-01-12 | Stop reason: HOSPADM

## 2021-01-08 RX ORDER — ACETAMINOPHEN 325 MG/1
650 TABLET ORAL
Status: DISCONTINUED | OUTPATIENT
Start: 2021-01-08 | End: 2021-01-12 | Stop reason: HOSPADM

## 2021-01-08 RX ORDER — HEPARIN SODIUM 10000 [USP'U]/100ML
11-25 INJECTION, SOLUTION INTRAVENOUS
Status: DISCONTINUED | OUTPATIENT
Start: 2021-01-08 | End: 2021-01-09

## 2021-01-08 RX ORDER — SODIUM CHLORIDE 0.9 % (FLUSH) 0.9 %
5-40 SYRINGE (ML) INJECTION EVERY 8 HOURS
Status: DISCONTINUED | OUTPATIENT
Start: 2021-01-08 | End: 2021-01-12 | Stop reason: HOSPADM

## 2021-01-08 RX ORDER — SODIUM CHLORIDE 0.9 % (FLUSH) 0.9 %
5-40 SYRINGE (ML) INJECTION AS NEEDED
Status: DISCONTINUED | OUTPATIENT
Start: 2021-01-08 | End: 2021-01-12 | Stop reason: HOSPADM

## 2021-01-08 RX ORDER — VANCOMYCIN 1.75 GRAM/500 ML IN 0.9 % SODIUM CHLORIDE INTRAVENOUS
1750 ONCE
Status: COMPLETED | OUTPATIENT
Start: 2021-01-08 | End: 2021-01-08

## 2021-01-08 RX ORDER — SODIUM CHLORIDE 0.9 % (FLUSH) 0.9 %
5-10 SYRINGE (ML) INJECTION AS NEEDED
Status: DISCONTINUED | OUTPATIENT
Start: 2021-01-08 | End: 2021-01-08

## 2021-01-08 RX ORDER — GUAIFENESIN 100 MG/5ML
324 LIQUID (ML) ORAL
Status: COMPLETED | OUTPATIENT
Start: 2021-01-08 | End: 2021-01-08

## 2021-01-08 RX ORDER — HEPARIN SODIUM 5000 [USP'U]/ML
4000 INJECTION, SOLUTION INTRAVENOUS; SUBCUTANEOUS ONCE
Status: DISCONTINUED | OUTPATIENT
Start: 2021-01-08 | End: 2021-01-09

## 2021-01-08 RX ORDER — GUAIFENESIN 100 MG/5ML
81 LIQUID (ML) ORAL DAILY
Status: DISCONTINUED | OUTPATIENT
Start: 2021-01-08 | End: 2021-01-12 | Stop reason: HOSPADM

## 2021-01-08 RX ORDER — ATENOLOL 50 MG/1
50 TABLET ORAL DAILY
Status: DISCONTINUED | OUTPATIENT
Start: 2021-01-09 | End: 2021-01-11

## 2021-01-08 RX ORDER — SODIUM CHLORIDE 9 MG/ML
75 INJECTION, SOLUTION INTRAVENOUS CONTINUOUS
Status: DISCONTINUED | OUTPATIENT
Start: 2021-01-08 | End: 2021-01-09

## 2021-01-08 RX ORDER — ONDANSETRON 2 MG/ML
4 INJECTION INTRAMUSCULAR; INTRAVENOUS
Status: DISCONTINUED | OUTPATIENT
Start: 2021-01-08 | End: 2021-01-12 | Stop reason: HOSPADM

## 2021-01-08 RX ORDER — ATORVASTATIN CALCIUM 40 MG/1
40 TABLET, FILM COATED ORAL
Status: DISCONTINUED | OUTPATIENT
Start: 2021-01-08 | End: 2021-01-09

## 2021-01-08 RX ORDER — HEPARIN SODIUM 5000 [USP'U]/ML
2000 INJECTION, SOLUTION INTRAVENOUS; SUBCUTANEOUS AS NEEDED
Status: DISCONTINUED | OUTPATIENT
Start: 2021-01-09 | End: 2021-01-09

## 2021-01-08 RX ORDER — HEPARIN SODIUM 5000 [USP'U]/ML
4000 INJECTION, SOLUTION INTRAVENOUS; SUBCUTANEOUS AS NEEDED
Status: DISCONTINUED | OUTPATIENT
Start: 2021-01-09 | End: 2021-01-09

## 2021-01-08 RX ORDER — POLYETHYLENE GLYCOL 3350 17 G/17G
17 POWDER, FOR SOLUTION ORAL DAILY PRN
Status: DISCONTINUED | OUTPATIENT
Start: 2021-01-08 | End: 2021-01-12 | Stop reason: HOSPADM

## 2021-01-08 RX ORDER — ACETAMINOPHEN 325 MG/1
650 TABLET ORAL
Status: DISCONTINUED | OUTPATIENT
Start: 2021-01-08 | End: 2021-01-08 | Stop reason: SDUPTHER

## 2021-01-08 RX ORDER — ACETAMINOPHEN 500 MG
1000 TABLET ORAL
Status: COMPLETED | OUTPATIENT
Start: 2021-01-08 | End: 2021-01-08

## 2021-01-08 RX ORDER — PROMETHAZINE HYDROCHLORIDE 25 MG/1
12.5 TABLET ORAL
Status: DISCONTINUED | OUTPATIENT
Start: 2021-01-08 | End: 2021-01-12 | Stop reason: HOSPADM

## 2021-01-08 RX ADMIN — PIPERACILLIN AND TAZOBACTAM 3.38 G: 3; .375 INJECTION, POWDER, LYOPHILIZED, FOR SOLUTION INTRAVENOUS at 19:51

## 2021-01-08 RX ADMIN — SODIUM CHLORIDE, POTASSIUM CHLORIDE, SODIUM LACTATE AND CALCIUM CHLORIDE 1000 ML: 600; 310; 30; 20 INJECTION, SOLUTION INTRAVENOUS at 19:51

## 2021-01-08 RX ADMIN — IOPAMIDOL 100 ML: 755 INJECTION, SOLUTION INTRAVENOUS at 21:05

## 2021-01-08 RX ADMIN — VANCOMYCIN HYDROCHLORIDE 1750 MG: 10 INJECTION, POWDER, LYOPHILIZED, FOR SOLUTION INTRAVENOUS at 21:19

## 2021-01-08 RX ADMIN — ACETAMINOPHEN 1000 MG: 500 TABLET ORAL at 19:50

## 2021-01-08 RX ADMIN — ASPIRIN 81 MG CHEWABLE TABLET 324 MG: 81 TABLET CHEWABLE at 20:28

## 2021-01-08 RX ADMIN — SODIUM CHLORIDE, POTASSIUM CHLORIDE, SODIUM LACTATE AND CALCIUM CHLORIDE 848 ML: 600; 310; 30; 20 INJECTION, SOLUTION INTRAVENOUS at 21:19

## 2021-01-08 NOTE — ED PROVIDER NOTES
EMERGENCY DEPARTMENT HISTORY AND PHYSICAL EXAM      Date: 1/8/2021  Patient Name: Leora Lennox    Please note that this dictation was completed with Hazen Schaumann, the computer voice recognition software. Quite often unanticipated grammatical, syntax, homophones, and other interpretive errors are inadvertently transcribed by the computer software. Please disregard these errors. Please excuse any errors that have escaped final proofreading. History of Presenting Illness     Chief Complaint   Patient presents with    Dysarthria       History Provided By: Patient, EMS, family    HPI: Leora Lennox, 80 y.o. female, presenting the emergency department brought in with a chief complaint of dysarthria. EMS and family provide most of the history. They state that a welfare check was performed on the patient, she lives in an independent living facility and she had not been answering her phone. When they arrived they found her to have difficulty speaking. There is no other focal neurologic deficit found. She was transferred here on my evaluation patient was awake and alert, she had no focal deficit. She is found to have a temperature of 103 rectally. She has no complaints. She denies any chest pain, shortness of breath. Denies any abdominal pain. Daughter reports that she does get frequent urinary tract infections. Denies any skin rash. Denies any dysuria. Denies any loss of smell or taste. No known Covid exposure. PCP: Jone Knapp MD    No current facility-administered medications on file prior to encounter. Current Outpatient Medications on File Prior to Encounter   Medication Sig Dispense Refill    metoprolol succinate (TOPROL-XL) 25 mg XL tablet Take 25 mg by mouth daily.  potassium chloride SR (KLOR-CON 10) 10 mEq tablet Take 10 mEq by mouth two (2) times a day.  furosemide (LASIX) 80 mg tablet Take 80 mg by mouth daily.       cholecalciferol (Vitamin D3) (5000 Units/125 mcg) tab tablet Take 5,000 Units by mouth daily.  warfarin (COUMADIN) 2.5 mg tablet Take 2.5 mg by mouth five (5) days a week. 2.5 mg every day, except no dose on Monday and Friday      acetaminophen (TYLENOL) 500 mg tablet Take 1,000 mg by mouth nightly. Past History     Past Medical History:  Past Medical History:   Diagnosis Date    Arthritis     Atrial fibrillation ([address] 2015    post hip surgery    Chronic obstructive pulmonary disease (HCC)     Gastrointestinal disorder     gallstones    Hypertension     Iron deficiency     Leg ulcer (HCC)     Macular degeneration     OA (osteoarthritis) of shoulder     Spinal stenosis     lumbar       Past Surgical History:  Past Surgical History:   Procedure Laterality Date    HX HEENT      cataract surgery with lens implants    HX ORTHOPAEDIC      right hip surgery       Family History:  Family History   Problem Relation Age of Onset    Cancer Mother         jaw;     Stroke Mother     Other Father         rheumatic fever       Social History:  Social History     Tobacco Use    Smoking status: Former Smoker     Years: 60.00     Quit date: 1991     Years since quittin.7    Smokeless tobacco: Never Used   Substance Use Topics    Alcohol use: Yes    Drug use: Not on file       Allergies: Allergies   Allergen Reactions    Latex Itching         Review of Systems   Review of Systems   Constitutional: Positive for fever. Negative for chills. HENT: Negative for congestion and sore throat. Eyes: Negative for visual disturbance. Respiratory: Negative for cough and shortness of breath. Cardiovascular: Negative for chest pain and leg swelling. Gastrointestinal: Negative for abdominal pain, blood in stool, diarrhea and nausea. Endocrine: Negative for polyuria. Genitourinary: Negative for dysuria, flank pain, vaginal bleeding and vaginal discharge. Musculoskeletal: Negative for myalgias. Skin: Negative for rash.    Allergic/Immunologic: Negative for immunocompromised state. Neurological: Positive for speech difficulty. Negative for weakness and headaches. Psychiatric/Behavioral: Positive for confusion. Physical Exam   Physical Exam  Vitals signs and nursing note reviewed. Constitutional:       Appearance: She is ill-appearing and toxic-appearing. HENT:      Head: Normocephalic and atraumatic. Mouth/Throat:      Mouth: Mucous membranes are dry. Eyes:      General:         Right eye: No discharge. Left eye: No discharge. Conjunctiva/sclera: Conjunctivae normal.      Pupils: Pupils are equal, round, and reactive to light. Neck:      Musculoskeletal: Normal range of motion and neck supple. Trachea: No tracheal deviation. Cardiovascular:      Rate and Rhythm: Tachycardia present. Rhythm irregular. Heart sounds: Murmur present. Pulmonary:      Effort: Pulmonary effort is normal. No respiratory distress. Breath sounds: Normal breath sounds. No wheezing or rales. Comments: Patient is tachypneic, with clear breath sounds  Abdominal:      General: Bowel sounds are normal.      Palpations: Abdomen is soft. Tenderness: There is no abdominal tenderness. There is no guarding or rebound. Musculoskeletal: Normal range of motion. General: No tenderness or deformity. Skin:     General: Skin is warm and dry. Comments: Erythematous swollen area on the right breast with nipple retraction, seems to be consistent with breast cancer   Neurological:      Mental Status: She is alert. Comments: Oriented to self, place, year but not month. No facial droop, no slurring of speech. Tongue protrudes at midline   Equal strength in the upper and lower extremities.   Normal sensation of the bilateral upper and lower extremities     Psychiatric:         Behavior: Behavior normal.         Diagnostic Study Results     Labs -     Recent Results (from the past 12 hour(s))   PTT    Collection Time: 01/10/21 3:29 AM   Result Value Ref Range    aPTT 34.6 (H) 22.1 - 31.0 sec    aPTT, therapeutic range     58.0 - 77.0 SECS   CBC WITH AUTOMATED DIFF    Collection Time: 01/10/21  3:29 AM   Result Value Ref Range    WBC 15.1 (H) 3.6 - 11.0 K/uL    RBC 4.53 3.80 - 5.20 M/uL    HGB 12.6 11.5 - 16.0 g/dL    HCT 40.8 35.0 - 47.0 %    MCV 90.1 80.0 - 99.0 FL    MCH 27.8 26.0 - 34.0 PG    MCHC 30.9 30.0 - 36.5 g/dL    RDW 15.9 (H) 11.5 - 14.5 %    PLATELET 600 (L) 542 - 400 K/uL    MPV 11.2 8.9 - 12.9 FL    NRBC 0.0 0  WBC    ABSOLUTE NRBC 0.00 0.00 - 0.01 K/uL    NEUTROPHILS 83 (H) 32 - 75 %    LYMPHOCYTES 13 12 - 49 %    MONOCYTES 3 (L) 5 - 13 %    EOSINOPHILS 0 0 - 7 %    BASOPHILS 0 0 - 1 %    IMMATURE GRANULOCYTES 1 (H) 0.0 - 0.5 %    ABS. NEUTROPHILS 12.5 (H) 1.8 - 8.0 K/UL    ABS. LYMPHOCYTES 1.9 0.8 - 3.5 K/UL    ABS. MONOCYTES 0.5 0.0 - 1.0 K/UL    ABS. EOSINOPHILS 0.0 0.0 - 0.4 K/UL    ABS. BASOPHILS 0.0 0.0 - 0.1 K/UL    ABS. IMM. GRANS. 0.1 (H) 0.00 - 0.04 K/UL    DF AUTOMATED     METABOLIC PANEL, BASIC    Collection Time: 01/10/21  3:29 AM   Result Value Ref Range    Sodium 135 (L) 136 - 145 mmol/L    Potassium 3.6 3.5 - 5.1 mmol/L    Chloride 102 97 - 108 mmol/L    CO2 24 21 - 32 mmol/L    Anion gap 9 5 - 15 mmol/L    Glucose 151 (H) 65 - 100 mg/dL    BUN 41 (H) 6 - 20 MG/DL    Creatinine 1.72 (H) 0.55 - 1.02 MG/DL    BUN/Creatinine ratio 24 (H) 12 - 20      GFR est AA 33 (L) >60 ml/min/1.73m2    GFR est non-AA 28 (L) >60 ml/min/1.73m2    Calcium 8.8 8.5 - 10.1 MG/DL   PROTHROMBIN TIME + INR    Collection Time: 01/10/21  3:29 AM   Result Value Ref Range    INR 3.2 (H) 0.9 - 1.1      Prothrombin time 31.4 (H) 9.0 - 11.1 sec   MAGNESIUM    Collection Time: 01/10/21  3:29 AM   Result Value Ref Range    Magnesium 2.6 (H) 1.6 - 2.4 mg/dL       Radiologic Studies -   XR CHEST PORT   Final Result   Impression:   1.  Enlarged cardiac silhouette, otherwise no acute disease         CT HEAD WO CONT   Final Result IMPRESSION: No acute changes. CTA CHEST W OR W WO CONT   Final Result   IMPRESSION: No acute intrathoracic findings. Incidental right breast mass      Axial CT scan of the abdomen and pelvis obtained after intravenous injection 100   cc of Isovue-370 no oral contrast.   Gallstones are seen in a nondistended gallbladder. Liver and spleen appear normal in size. Pancreas appears unremarkable. There is   no bowel wall thickening or obstruction. Mild diverticulosis of the sigmoid. The   bladder is midline, uterus appears unremarkable. Prior right hip surgery. No   free air of free fluid. IMPRESSION: No acute findings suspected. Few incidentals as above. CT ABD PELV W CONT   Final Result   IMPRESSION: No acute intrathoracic findings. Incidental right breast mass      Axial CT scan of the abdomen and pelvis obtained after intravenous injection 100   cc of Isovue-370 no oral contrast.   Gallstones are seen in a nondistended gallbladder. Liver and spleen appear normal in size. Pancreas appears unremarkable. There is   no bowel wall thickening or obstruction. Mild diverticulosis of the sigmoid. The   bladder is midline, uterus appears unremarkable. Prior right hip surgery. No   free air of free fluid. IMPRESSION: No acute findings suspected. Few incidentals as above. XR CHEST PORT   Final Result   IMPRESSION:      Chronic interstitial lung disease. No acute process on portable chest view. No   significant change since 2018. CT Results  (Last 48 hours)               01/08/21 2104  CT HEAD WO CONT Final result    Impression:  IMPRESSION: No acute changes. Narrative:  EXAM: CT HEAD WO CONT       INDICATION: altered       COMPARISON: None. CONTRAST: None. TECHNIQUE: Unenhanced CT of the head was performed using 5 mm images. Brain and   bone windows were generated. Coronal and sagittal reformats.  CT dose reduction   was achieved through use of a standardized protocol tailored for this   examination and automatic exposure control for dose modulation. FINDINGS:   No extra-axial fluid collection, hemorrhage shift or masses, atrophy and white   matter disease. 01/08/21 2104  CTA CHEST W OR W WO CONT Final result    Impression:  IMPRESSION: No acute intrathoracic findings. Incidental right breast mass       Axial CT scan of the abdomen and pelvis obtained after intravenous injection 100   cc of Isovue-370 no oral contrast.   Gallstones are seen in a nondistended gallbladder. Liver and spleen appear normal in size. Pancreas appears unremarkable. There is   no bowel wall thickening or obstruction. Mild diverticulosis of the sigmoid. The   bladder is midline, uterus appears unremarkable. Prior right hip surgery. No   free air of free fluid. IMPRESSION: No acute findings suspected. Few incidentals as above. Narrative:  Clinical indication: Shortness of breath, fever, increased bilirubin Cuco. Sepsis. Localizer obtained without contrast at the level of the pulmonary arteries. Fast   injection rate of 100 cc of Isovue-370 with review of the raw data and MIP   reconstructions. No prior. CT dose reduction was achieved through the use of a   standardized protocol tailored for this examination and automatic exposure   control for dose modulation . Hollis Alexander No evidence for pulmonary emboli. No mediastinal masses or adenopathy. No   pericardial pleural effusion no shift or pneumothorax. Diffuse moderate   atherosclerosis of the thoracic aorta. No lung infiltrates or masses. Incidental   suspicion of a right breast mass. 01/08/21 2104  CT ABD PELV W CONT Final result    Impression:  IMPRESSION: No acute intrathoracic findings. Incidental right breast mass       Axial CT scan of the abdomen and pelvis obtained after intravenous injection 100   cc of Isovue-370 no oral contrast.   Gallstones are seen in a nondistended gallbladder.    Liver and spleen appear normal in size. Pancreas appears unremarkable. There is   no bowel wall thickening or obstruction. Mild diverticulosis of the sigmoid. The   bladder is midline, uterus appears unremarkable. Prior right hip surgery. No   free air of free fluid. IMPRESSION: No acute findings suspected. Few incidentals as above. Narrative:  Clinical indication: Shortness of breath, fever, increased bilirubin Cuco. Sepsis. Localizer obtained without contrast at the level of the pulmonary arteries. Fast   injection rate of 100 cc of Isovue-370 with review of the raw data and MIP   reconstructions. No prior. CT dose reduction was achieved through the use of a   standardized protocol tailored for this examination and automatic exposure   control for dose modulation . Darlynn Magic No evidence for pulmonary emboli. No mediastinal masses or adenopathy. No   pericardial pleural effusion no shift or pneumothorax. Diffuse moderate   atherosclerosis of the thoracic aorta. No lung infiltrates or masses. Incidental   suspicion of a right breast mass. CXR Results  (Last 48 hours)               01/09/21 1202  XR CHEST PORT Final result    Impression:  Impression:   1. Enlarged cardiac silhouette, otherwise no acute disease           Narrative:  INDICATION:  f/u, worsening of resp. status        EXAM: Single portable view of chest 1153 . Comparison:1/8/2021       Findings: Cardiac silhouette is enlarged. Pulmonary vasculature is not engorged. There are no focal parenchymal opacities, effusions, or pneumothorax. 01/08/21 1946  XR CHEST PORT Final result    Impression:  IMPRESSION:       Chronic interstitial lung disease. No acute process on portable chest view. No   significant change since 2018. Narrative:  EXAM: XR CHEST PORT       INDICATION: Difficulty speaking, COVID19 rule out. COPD and chronic interstitial   lung disease.        COMPARISON: Portable chest on 1/28/2018 and 1/25/2018.       TECHNIQUE: Semiupright portable chest AP view       FINDINGS: Cardiac monitoring wires overlie the thorax. Mild cardiomegaly is   accentuated by technique. Aortic arch is atherosclerotic. The pulmonary   vasculature is within normal limits.        Reticular interstitial opacities are similar to 2018. No focal airspace opacity.   No pneumothorax. Bones are osteopenic.                   Medical Decision Making   I am the first provider for this patient.    I reviewed the vital signs, available nursing notes, past medical history, past surgical history, family history and social history.    Vital Signs-Reviewed the patient's vital signs.  Patient Vitals for the past 12 hrs:   Temp Pulse Resp BP SpO2   01/10/21 0323 97.3 °F (36.3 °C) 68 20 (!) 148/61 96 %   01/09/21 2300 97.7 °F (36.5 °C) 70 22 136/68 90 %   01/09/21 1904 97.6 °F (36.4 °C) 86 24 (!) 124/59 97 %       EKG interpretation: (Preliminary)  EKG shows atrial fibrillation with RVR, rate 111.  Left axis deviation.  QRS measures 122, QTc 484.  Anterior septal Q waves, no evidence of STEMI.    Records Reviewed:   Nursing notes, Prior visits         Provider Notes (Medical Decision Making):   Patient presented with symptoms of dysarthria, but there is no evidence of stroke.  She is febrile, appears septic.  Will initiate septic work-up.  Will give some broad-spectrum antibiotics.  Uncertain source at this time.  Differential for the source includes pneumonia, Covid, intra-abdominal infection, UTI.  There is no evidence of a skin infection breast appears chronic, appears to be consistent with breast cancer,     ED Course:   Initial assessment performed. The patients presenting problems have been discussed, and they are in agreement with the care plan formulated and outlined with them.  I have encouraged them to ask questions as they arise throughout their visit.    ED Course as of Jan 08 2316 Fri Jan 08, 2021 2219 Lactic trending up, but patient  was given lactated Ringer's which may be contributing. There is no evidence of dead bowel on CT imaging. Patient's work of breathing is increased a little bit, she is more hypoxic, this may be contributing. She is hypertensive. Her heart rate is improved. She has been given antibiotics. She may be developing some edema from the IV fluid. Last EF was noted to be 55%    [AR]   2233 Discussed CODE STATUS with patient's daughter who is her power of . She states that she would not want any uncomfortable or invasive interventions at this time. She understands that patients with sepsis have a high mortality rate at the patient's age. .    [AR]      ED Course User Index  [AR] Brian Andres, DO       6:42 PM  Code sepsis called given patient's altered mentation and SIRS criteria. Source of infection unknown at this point, will initiate work-up with blood cultures, chest x-ray, urine, urine culture, SARS-CoV-2 testing. Will cover with broad-spectrum antibiotics. ED [facility] ED SEPSIS NOTE:     6:43 PM The patient now meets criteria for: Severe Sepsis    1. Fluid resuscitation with: Patient does not require a 30cc/kg bolus because they do not meet criteria for septic shock (SBP<90 or decreased by >40 mmHG from baseline, MAP<65, Lactate 4 or greater). 2. Due to concern for rapidly advancing infection and deterioration of patient's condition, antibiotics are started STAT and cultures ordered. I've performed a sepsis reassessment of the patient's clinical volume status and tissue perfusion at time 11:19 PM    11:20 PM  Patient still not having any chest pain. Troponin trending up. She is now requiring 2 L oxygen. We will start her on heparin. I consulted cardiology, but have not heard back from them. Critical Care Time:   I have spent 95 minutes of critical care time in evaluating and treating this patient.   This includes time spent at bedside, time with family and decision makers, documentation, review of labs and imaging, and/or consultation with specialists. It does not include time spent on separately billed procedures. This patient presents with a critical illness or injury that acutely impairs one or more vital organ systems such that there is a high probability of imminent or life threatening deterioration in the patient's condition. This case involved decision making of high complexity to assess, manipulate, and support vital organ system failure and/or to prevent further life threatening deterioration of the patient's condition. Failure to initiate these interventions on an urgent basis would likely result in sudden, clinically significant or life threatening deterioration in the patient's condition. Abnormal findings supporting critical care: Severe sepsis, lactic acidosis, non-ST elevation myocardial infarction, PRACHI, atrial fibrillation with RVR  Interventions to support critical care: Lab interpretation, ECG interpretation, anticoagulation, IV fluids, IV antibiotics, cardiac monitoring. Failure to intervene may result in: Worsening organ dysfunction, hypotension, death    Disposition:  Patient is being admitted to the hospital by Dr. Fely Sanchez. The results of their tests and reasons for their admission have been discussed with them and/or available family. They convey agreement and understanding for the need to be admitted and for their admission diagnosis. Consultation has been made with the inpatient physician specialist for hospitalization. PLAN:  1. Current Discharge Medication List        2. Follow-up Information    None         Return to ED if worse     Diagnosis     Clinical Impression:   1. Severe sepsis ([address]    2. Lactic acidosis    3. NSTEMI (non-ST elevated myocardial infarction) [facility])        Attestations:   This note was completed by Vida Rhodes DO

## 2021-01-08 NOTE — ED TRIAGE NOTES
Pt arrives via EMS from [facility] for c/o slurred speech, speech clear per EMS upon their arrival.  Unknown LKW time, pt lives in independent living.   Pt has no complaint upon arrival.

## 2021-01-09 ENCOUNTER — APPOINTMENT (OUTPATIENT)
Dept: NON INVASIVE DIAGNOSTICS | Age: 86
DRG: 871 | End: 2021-01-09
Attending: GENERAL ACUTE CARE HOSPITAL
Payer: MEDICARE

## 2021-01-09 ENCOUNTER — APPOINTMENT (OUTPATIENT)
Dept: VASCULAR SURGERY | Age: 86
DRG: 871 | End: 2021-01-09
Attending: GENERAL ACUTE CARE HOSPITAL
Payer: MEDICARE

## 2021-01-09 ENCOUNTER — APPOINTMENT (OUTPATIENT)
Dept: GENERAL RADIOLOGY | Age: 86
DRG: 871 | End: 2021-01-09
Attending: STUDENT IN AN ORGANIZED HEALTH CARE EDUCATION/TRAINING PROGRAM
Payer: MEDICARE

## 2021-01-09 LAB
ANION GAP SERPL CALC-SCNC: 9 MMOL/L (ref 5–15)
APPEARANCE UR: ABNORMAL
APTT PPP: 33 SEC (ref 22.1–31)
APTT PPP: 34.7 SEC (ref 22.1–31)
ARTERIAL PATENCY WRIST A: YES
BACTERIA URNS QL MICRO: ABNORMAL /HPF
BASE EXCESS BLD CALC-SCNC: 1 MMOL/L
BASOPHILS # BLD: 0 K/UL (ref 0–0.1)
BASOPHILS # BLD: 0 K/UL (ref 0–0.1)
BASOPHILS NFR BLD: 0 % (ref 0–1)
BASOPHILS NFR BLD: 0 % (ref 0–1)
BDY SITE: ABNORMAL
BILIRUB UR QL: NEGATIVE
BNP SERPL-MCNC: ABNORMAL PG/ML
BUN SERPL-MCNC: 31 MG/DL (ref 6–20)
BUN/CREAT SERPL: 18 (ref 12–20)
CA-I BLD-SCNC: 1.08 MMOL/L (ref 1.12–1.32)
CALCIUM SERPL-MCNC: 8.3 MG/DL (ref 8.5–10.1)
CHLORIDE SERPL-SCNC: 103 MMOL/L (ref 97–108)
CHOLEST SERPL-MCNC: 116 MG/DL
CO2 SERPL-SCNC: 26 MMOL/L (ref 21–32)
COLOR UR: ABNORMAL
CREAT SERPL-MCNC: 1.72 MG/DL (ref 0.55–1.02)
D DIMER PPP FEU-MCNC: 3.09 MG/L FEU (ref 0–0.65)
DIFFERENTIAL METHOD BLD: ABNORMAL
DIFFERENTIAL METHOD BLD: ABNORMAL
EOSINOPHIL # BLD: 0 K/UL (ref 0–0.4)
EOSINOPHIL # BLD: 0 K/UL (ref 0–0.4)
EOSINOPHIL NFR BLD: 0 % (ref 0–7)
EOSINOPHIL NFR BLD: 0 % (ref 0–7)
EPITH CASTS URNS QL MICRO: ABNORMAL /LPF
ERYTHROCYTE [DISTWIDTH] IN BLOOD BY AUTOMATED COUNT: 15.7 % (ref 11.5–14.5)
ERYTHROCYTE [DISTWIDTH] IN BLOOD BY AUTOMATED COUNT: 15.8 % (ref 11.5–14.5)
FIBRINOGEN PPP-MCNC: 455 MG/DL (ref 200–475)
GAS FLOW.O2 O2 DELIVERY SYS: ABNORMAL L/MIN
GAS FLOW.O2 SETTING OXYMISER: 4 L/M
GLUCOSE SERPL-MCNC: 180 MG/DL (ref 65–100)
GLUCOSE UR STRIP.AUTO-MCNC: NEGATIVE MG/DL
HCO3 BLD-SCNC: 23.7 MMOL/L (ref 22–26)
HCT VFR BLD AUTO: 36.3 % (ref 35–47)
HCT VFR BLD AUTO: 41.7 % (ref 35–47)
HDLC SERPL-MCNC: 41 MG/DL
HDLC SERPL: 2.8 {RATIO} (ref 0–5)
HGB BLD-MCNC: 11.7 G/DL (ref 11.5–16)
HGB BLD-MCNC: 12.7 G/DL (ref 11.5–16)
HGB UR QL STRIP: ABNORMAL
IMM GRANULOCYTES # BLD AUTO: 0 K/UL (ref 0–0.04)
IMM GRANULOCYTES # BLD AUTO: 0.1 K/UL (ref 0–0.04)
IMM GRANULOCYTES NFR BLD AUTO: 0 % (ref 0–0.5)
IMM GRANULOCYTES NFR BLD AUTO: 1 % (ref 0–0.5)
INR PPP: 2.4 (ref 0.9–1.1)
INR PPP: 2.4 (ref 0.9–1.1)
KETONES UR QL STRIP.AUTO: NEGATIVE MG/DL
LACTATE BLD-SCNC: 4.26 MMOL/L (ref 0.4–2)
LACTATE SERPL-SCNC: 2.1 MMOL/L (ref 0.4–2)
LDLC SERPL CALC-MCNC: 53.4 MG/DL (ref 0–100)
LEUKOCYTE ESTERASE UR QL STRIP.AUTO: ABNORMAL
LIPID PROFILE,FLP: NORMAL
LYMPHOCYTES # BLD: 1.7 K/UL (ref 0.8–3.5)
LYMPHOCYTES # BLD: 2.4 K/UL (ref 0.8–3.5)
LYMPHOCYTES NFR BLD: 11 % (ref 12–49)
LYMPHOCYTES NFR BLD: 15 % (ref 12–49)
MAGNESIUM SERPL-MCNC: 2.3 MG/DL (ref 1.6–2.4)
MCH RBC QN AUTO: 27.5 PG (ref 26–34)
MCH RBC QN AUTO: 28.3 PG (ref 26–34)
MCHC RBC AUTO-ENTMCNC: 30.5 G/DL (ref 30–36.5)
MCHC RBC AUTO-ENTMCNC: 32.2 G/DL (ref 30–36.5)
MCV RBC AUTO: 87.9 FL (ref 80–99)
MCV RBC AUTO: 90.3 FL (ref 80–99)
MONOCYTES # BLD: 0.9 K/UL (ref 0–1)
MONOCYTES # BLD: 0.9 K/UL (ref 0–1)
MONOCYTES NFR BLD: 4 % (ref 5–13)
MONOCYTES NFR BLD: 8 % (ref 5–13)
NEUTS BAND NFR BLD MANUAL: 2 %
NEUTS SEG # BLD: 18.7 K/UL (ref 1.8–8)
NEUTS SEG # BLD: 8.4 K/UL (ref 1.8–8)
NEUTS SEG NFR BLD: 76 % (ref 32–75)
NEUTS SEG NFR BLD: 83 % (ref 32–75)
NITRITE UR QL STRIP.AUTO: POSITIVE
NRBC # BLD: 0 K/UL (ref 0–0.01)
NRBC # BLD: 0 K/UL (ref 0–0.01)
NRBC BLD-RTO: 0 PER 100 WBC
NRBC BLD-RTO: 0 PER 100 WBC
PCO2 BLD: 29.7 MMHG (ref 35–45)
PH BLD: 7.51 [PH] (ref 7.35–7.45)
PH UR STRIP: 5 [PH] (ref 5–8)
PLATELET # BLD AUTO: 165 K/UL (ref 150–400)
PLATELET # BLD AUTO: 188 K/UL (ref 150–400)
PMV BLD AUTO: 10.6 FL (ref 8.9–12.9)
PMV BLD AUTO: 10.8 FL (ref 8.9–12.9)
PO2 BLD: 82 MMHG (ref 80–100)
POTASSIUM SERPL-SCNC: 3.8 MMOL/L (ref 3.5–5.1)
PROT UR STRIP-MCNC: 100 MG/DL
PROTHROMBIN TIME: 24.6 SEC (ref 9–11.1)
PROTHROMBIN TIME: 24.6 SEC (ref 9–11.1)
RBC # BLD AUTO: 4.13 M/UL (ref 3.8–5.2)
RBC # BLD AUTO: 4.62 M/UL (ref 3.8–5.2)
RBC #/AREA URNS HPF: ABNORMAL /HPF (ref 0–5)
RBC MORPH BLD: ABNORMAL
SAO2 % BLD: 97 % (ref 92–97)
SODIUM SERPL-SCNC: 138 MMOL/L (ref 136–145)
SP GR UR REFRACTOMETRY: >1.03 (ref 1–1.03)
SPECIMEN TYPE: ABNORMAL
THERAPEUTIC RANGE,PTTT: ABNORMAL SECS (ref 58–77)
THERAPEUTIC RANGE,PTTT: ABNORMAL SECS (ref 58–77)
TOTAL RESP. RATE, ITRR: 16
TRIGL SERPL-MCNC: 108 MG/DL (ref ?–150)
TROPONIN I SERPL-MCNC: 3.5 NG/ML
TROPONIN I SERPL-MCNC: 5.76 NG/ML
UA: UC IF INDICATED,UAUC: ABNORMAL
UROBILINOGEN UR QL STRIP.AUTO: 0.2 EU/DL (ref 0.2–1)
VLDLC SERPL CALC-MCNC: 21.6 MG/DL
WBC # BLD AUTO: 11 K/UL (ref 3.6–11)
WBC # BLD AUTO: 22 K/UL (ref 3.6–11)
WBC URNS QL MICRO: ABNORMAL /HPF (ref 0–4)

## 2021-01-09 PROCEDURE — 87635 SARS-COV-2 COVID-19 AMP PRB: CPT

## 2021-01-09 PROCEDURE — 74011250637 HC RX REV CODE- 250/637: Performed by: GENERAL ACUTE CARE HOSPITAL

## 2021-01-09 PROCEDURE — 2709999900 HC NON-CHARGEABLE SUPPLY

## 2021-01-09 PROCEDURE — 93306 TTE W/DOPPLER COMPLETE: CPT | Performed by: INTERNAL MEDICINE

## 2021-01-09 PROCEDURE — 93880 EXTRACRANIAL BILAT STUDY: CPT

## 2021-01-09 PROCEDURE — 82803 BLOOD GASES ANY COMBINATION: CPT

## 2021-01-09 PROCEDURE — 83880 ASSAY OF NATRIURETIC PEPTIDE: CPT

## 2021-01-09 PROCEDURE — 83735 ASSAY OF MAGNESIUM: CPT

## 2021-01-09 PROCEDURE — 77010033678 HC OXYGEN DAILY

## 2021-01-09 PROCEDURE — 36415 COLL VENOUS BLD VENIPUNCTURE: CPT

## 2021-01-09 PROCEDURE — 74011000250 HC RX REV CODE- 250

## 2021-01-09 PROCEDURE — 85025 COMPLETE CBC W/AUTO DIFF WBC: CPT

## 2021-01-09 PROCEDURE — 83605 ASSAY OF LACTIC ACID: CPT

## 2021-01-09 PROCEDURE — 74011250636 HC RX REV CODE- 250/636: Performed by: STUDENT IN AN ORGANIZED HEALTH CARE EDUCATION/TRAINING PROGRAM

## 2021-01-09 PROCEDURE — 80061 LIPID PANEL: CPT

## 2021-01-09 PROCEDURE — 74011000258 HC RX REV CODE- 258: Performed by: STUDENT IN AN ORGANIZED HEALTH CARE EDUCATION/TRAINING PROGRAM

## 2021-01-09 PROCEDURE — 80048 BASIC METABOLIC PNL TOTAL CA: CPT

## 2021-01-09 PROCEDURE — 77030012794 HC KT NSL CANN/HGH TRAN -A

## 2021-01-09 PROCEDURE — 71045 X-RAY EXAM CHEST 1 VIEW: CPT

## 2021-01-09 PROCEDURE — 74011000250 HC RX REV CODE- 250: Performed by: GENERAL ACUTE CARE HOSPITAL

## 2021-01-09 PROCEDURE — 93880 EXTRACRANIAL BILAT STUDY: CPT | Performed by: PSYCHIATRY & NEUROLOGY

## 2021-01-09 PROCEDURE — 36600 WITHDRAWAL OF ARTERIAL BLOOD: CPT

## 2021-01-09 PROCEDURE — 93306 TTE W/DOPPLER COMPLETE: CPT

## 2021-01-09 PROCEDURE — 85610 PROTHROMBIN TIME: CPT

## 2021-01-09 PROCEDURE — P9047 ALBUMIN (HUMAN), 25%, 50ML: HCPCS | Performed by: NURSE PRACTITIONER

## 2021-01-09 PROCEDURE — 85730 THROMBOPLASTIN TIME PARTIAL: CPT

## 2021-01-09 PROCEDURE — 74011250636 HC RX REV CODE- 250/636: Performed by: NURSE PRACTITIONER

## 2021-01-09 PROCEDURE — 99223 1ST HOSP IP/OBS HIGH 75: CPT | Performed by: PSYCHIATRY & NEUROLOGY

## 2021-01-09 PROCEDURE — 84484 ASSAY OF TROPONIN QUANT: CPT

## 2021-01-09 PROCEDURE — 93005 ELECTROCARDIOGRAM TRACING: CPT

## 2021-01-09 PROCEDURE — 65660000001 HC RM ICU INTERMED STEPDOWN

## 2021-01-09 PROCEDURE — 74011250636 HC RX REV CODE- 250/636: Performed by: GENERAL ACUTE CARE HOSPITAL

## 2021-01-09 RX ORDER — DILTIAZEM HYDROCHLORIDE 5 MG/ML
10 INJECTION INTRAVENOUS ONCE
Status: COMPLETED | OUTPATIENT
Start: 2021-01-09 | End: 2021-01-09

## 2021-01-09 RX ORDER — CHOLECALCIFEROL TAB 125 MCG (5000 UNIT) 125 MCG
5000 TAB ORAL DAILY
COMMUNITY
End: 2021-12-30

## 2021-01-09 RX ORDER — FUROSEMIDE 80 MG/1
80 TABLET ORAL DAILY
COMMUNITY

## 2021-01-09 RX ORDER — METOPROLOL TARTRATE 5 MG/5ML
2.5 INJECTION INTRAVENOUS
Status: DISCONTINUED | OUTPATIENT
Start: 2021-01-08 | End: 2021-01-12 | Stop reason: HOSPADM

## 2021-01-09 RX ORDER — POTASSIUM CHLORIDE 750 MG/1
10 TABLET, FILM COATED, EXTENDED RELEASE ORAL 2 TIMES DAILY
COMMUNITY

## 2021-01-09 RX ORDER — VANCOMYCIN 1.75 GRAM/500 ML IN 0.9 % SODIUM CHLORIDE INTRAVENOUS
1750 ONCE
Status: DISCONTINUED | OUTPATIENT
Start: 2021-01-09 | End: 2021-01-09 | Stop reason: SDUPTHER

## 2021-01-09 RX ORDER — VANCOMYCIN HYDROCHLORIDE
1250
Status: DISCONTINUED | OUTPATIENT
Start: 2021-01-10 | End: 2021-01-09

## 2021-01-09 RX ORDER — DILTIAZEM HYDROCHLORIDE 5 MG/ML
INJECTION INTRAVENOUS
Status: COMPLETED
Start: 2021-01-09 | End: 2021-01-09

## 2021-01-09 RX ORDER — WARFARIN 2.5 MG/1
2.5 TABLET ORAL
COMMUNITY
End: 2021-12-30

## 2021-01-09 RX ORDER — FUROSEMIDE 10 MG/ML
40 INJECTION INTRAMUSCULAR; INTRAVENOUS ONCE
Status: COMPLETED | OUTPATIENT
Start: 2021-01-09 | End: 2021-01-09

## 2021-01-09 RX ORDER — METOPROLOL SUCCINATE 25 MG/1
25 TABLET, EXTENDED RELEASE ORAL DAILY
COMMUNITY

## 2021-01-09 RX ORDER — ALBUMIN HUMAN 250 G/1000ML
25 SOLUTION INTRAVENOUS ONCE
Status: COMPLETED | OUTPATIENT
Start: 2021-01-09 | End: 2021-01-09

## 2021-01-09 RX ADMIN — DILTIAZEM HYDROCHLORIDE 10 MG: 5 INJECTION INTRAVENOUS at 03:48

## 2021-01-09 RX ADMIN — Medication 10 ML: at 22:59

## 2021-01-09 RX ADMIN — FUROSEMIDE 40 MG: 10 INJECTION, SOLUTION INTRAMUSCULAR; INTRAVENOUS at 19:04

## 2021-01-09 RX ADMIN — CEFEPIME HYDROCHLORIDE 2 G: 2 INJECTION, POWDER, FOR SOLUTION INTRAVENOUS at 12:09

## 2021-01-09 RX ADMIN — Medication 10 ML: at 14:41

## 2021-01-09 RX ADMIN — ATENOLOL 50 MG: 50 TABLET ORAL at 12:22

## 2021-01-09 RX ADMIN — ATORVASTATIN CALCIUM 40 MG: 40 TABLET, FILM COATED ORAL at 00:26

## 2021-01-09 RX ADMIN — ALBUMIN (HUMAN) 25 G: 0.25 INJECTION, SOLUTION INTRAVENOUS at 05:39

## 2021-01-09 RX ADMIN — METHYLPREDNISOLONE SODIUM SUCCINATE 125 MG: 125 INJECTION, POWDER, FOR SOLUTION INTRAMUSCULAR; INTRAVENOUS at 12:09

## 2021-01-09 RX ADMIN — SODIUM CHLORIDE 75 ML/HR: 9 INJECTION, SOLUTION INTRAVENOUS at 00:27

## 2021-01-09 RX ADMIN — SODIUM CHLORIDE 500 ML: 9 INJECTION, SOLUTION INTRAVENOUS at 05:16

## 2021-01-09 RX ADMIN — METOPROLOL TARTRATE 2.5 MG: 1 INJECTION, SOLUTION INTRAVENOUS at 02:58

## 2021-01-09 NOTE — PROGRESS NOTES
Reason for Admission:  Covid Rule-Out                   RUR Score:     16%             PCP: First and Last name:  Rodriguez Parmar   Name of Practice:    Are you a current patient: Yes/No: Yes    Approximate date of last visit: Few days prior to admission    Can you participate in a virtual visit if needed:     Do you (patient/family) have any concerns for transition/discharge? No concerns identified               Plan for utilizing home health:   TBD    Current Advanced Directive/Advance Care Plan:  Pt is DNR and Advance Care Plan on file    Care Management Interventions  PCP Verified by CM: Yes(Pt reported that she last saw her PCP a few days ago.)  Physical Therapy Consult: Yes  Occupational Therapy Consult: Yes  Current Support Network: Family Lives Nearby(Pt daughter lives in [place] )     Pt is a 81 y/o female arrived via EMS to ED and was admitted to [address] [place] [address] for COVID r/o. CM contacted Pt to complete assessment. Pt reported living at [facility]. Pt reported being independent in ADL/IADL with assistance from a power chair and walker at times. Pt reported having [place] in the past which was provided by [facility]. At this time there are no recommendations for D/C planning. PT/OT consults pending. Transition of Care Plan:        1) CM will remain available to assist with coordination of care and D/C planning.     Gerald Stewart LCSW

## 2021-01-09 NOTE — CONSULTS
Consult/Admission    NAME: Janes Sousa   :  1925   MRN:  [id number]     Date/Time:  2021 1:13 PM    Patient PCP: Silvestre Kaur MD  ________________________________________________________________________     Assessment:     79 yo female admitted with acute infectious process, etiology yet to be defined. Fever 103. 2 on presentation to ER . No localized findings. CXR no infiltrates. Chronic Atrial Fibrillation ,  Noted in the records at least back 2015. RVR in setting of acute infectious process. Chronic anticoagulation with Warfarin , therapeutic level INR     Elevated Troponin , likely Type 2 NSTEMI secondary  to acute illness. Hypertension   Advanced age and Frailty . Acute / Chronic kidney failure. COVID rapid testing negative. COVID testing pending . Leukocytosis in setting of acute infection . Elevated Lacitic Acid . EKG Atrial Fibrillation . LBBB,   DNR . Resident [facility] . Plan:     Agree with current medical management     Recommend use of B blocker for rate control. Titrate dose as needed. Ca+ blocker could be added if needed. No plans for an invasive cardiac testing or intervention . Will review echo when that can be done and available. [x]           High complexity decision making was performed        Subjective:   CHIEF COMPLAINT:  None    Asked to see for A  Fib     HISTORY OF PRESENT ILLNESS:         HISTORY OF PRESENT ILLNESS:       From H and P :     \" Ramsey Mora is a 80 y.o.  female with past medical history of arthritis, atrial fibrillation on Coumadin, Rt breast mass, COPD, CKD stage III, hypertension, chronic venous stasis, spinal stenosis, who presents with an episode of confusion. Patient from [facility], was at her usual state of health until tonight when he was found to have some slurred speech and was confused, as per report.    When EMS arrived, patient had no focal neurological deficit and she was back at her baseline. During my evaluation, patient was alert and oriented x3, was able to get off bedside commode and get back to her bed with minimal assistance, she denies any complaints and she is telling her that she got stuck in her wheelchair and could not get up and then she was evaluated and sent to the hospital for further management, and she did not want to come to the hospital.  Patient denies cough, shortness of breath, fever, chills, abdominal pain, chest pain, nausea/vomiting/diarrhea, change in urinary habits, falls, worsening lower extremity swelling. Patient stated that she has been treated for UTI 3 weeks ago. Denies any sick contacts. At the emergency room, heart rate 115, maximum temperature 103.2, breathing at 26 breaths/min with sats 95% on room air, /76. Blood work remarkable for lactic acid of 3.3, WBC 14.1, INR 2.4, fibrinogen 552, D-dimer 3.6, BUN 28, creatinine 1.6, bilirubin 1.7, , troponin I 0.02. Rapid Covid test negative. \"   Currently    We were asked to admit for work up and evaluation of the above problems.      Past Medical History:   Diagnosis Date    Arthritis     Atrial fibrillation ([facility] [address] 2015    post hip surgery    Chronic obstructive pulmonary disease (HCC)     Gastrointestinal disorder     gallstones    Hypertension     Iron deficiency     Leg ulcer ([address]     Macular degeneration     OA (osteoarthritis) of shoulder     Spinal stenosis     lumbar      Past Surgical History:   Procedure Laterality Date    HX HEENT      cataract surgery with lens implants    HX ORTHOPAEDIC      right hip surgery     Allergies   Allergen Reactions    Latex Itching      Meds:  See below  Social History     Tobacco Use    Smoking status: Former Smoker     Years: 60.00     Quit date: 1991     Years since quittin.7    Smokeless tobacco: Never Used   Substance Use Topics    Alcohol use: Yes      Family History   Problem Relation Age of Onset    Cancer Mother         jaw;    Blase Liming Stroke Mother     Other Father         rheumatic fever       REVIEW OF SYSTEMS:     []            Unable to obtain  ROS due to ---   [x]            Total of 12 systems reviewed as follows:    Constitutional: negative fever, negative chills, negative weight loss  Eyes:   negative visual changes  ENT:   negative sore throat, tongue or lip swelling  Respiratory:  negative cough, negative dyspnea  Cards:  negative for chest pain, palpitations, lower extremity edema  GI:   negative for nausea, vomiting, diarrhea, and abdominal pain  Genitourinary: negative for frequency, dysuria  Integument:  negative for rash   Hematologic:  negative for easy bruising and gum/nose bleeding  Musculoskel: negative for myalgias,  back pain  Neurological:  negative for headaches, dizziness, vertigo, weakness  Behavl/Psych: negative for feelings of anxiety, depression     Pertinent Positives include :    Objective:      Physical Exam:    Last 24hrs VS reviewed since prior progress note. Most recent are:    Visit Vitals  BP (!) 142/65   Pulse 91   Temp 97.2 °F (36.2 °C)   Resp 24   Ht 5' 7\" (1.702 m)   Wt 86.2 kg (190 lb)   SpO2 92%   BMI 29.76 kg/m²     No intake or output data in the 24 hours ending 01/09/21 1313     General Appearance: Well developed, well nourished, alert & oriented x 3,    no acute distress. Ears/Nose/Mouth/Throat: Pupils equal and round, Hearing grossly normal.  Neck: Supple. JVP within normal limits. Carotids good upstrokes, with no bruit. Chest: Lungs clear to auscultation bilaterally. Cardiovascular: Regular rate and rhythm, S1S2 normal, no murmur, rubs, gallops. Abdomen: Soft, non-tender, bowel sounds are active. No organomegaly. Extremities: No edema bilaterally. Femoral pulses +2, Distal Pulses +1. Skin: Warm and dry. Neuro: CN II-XII grossly intact, Strength and sensation grossly intact.     Data:      Prior to Admission medications    Medication Sig Start Date End Date Taking? Authorizing Provider   acetaminophen (TYLENOL) 500 mg tablet Take 1,000 mg by mouth nightly. Other, MD Demetrius   atenolol (TENORMIN) 50 mg tablet TAKE 1 TABLET BY MOUTH AT BEDTIME 8/31/17   Palmira Escoto MD   furosemide (LASIX) 40 mg tablet TAKE ONE TABLET BY MOUTH DAILY 5/31/16   Magalie Laboy MD       Recent Results (from the past 24 hour(s))   CULTURE, BLOOD    Collection Time: 01/08/21  6:45 PM    Specimen: Blood   Result Value Ref Range    Special Requests: NO SPECIAL REQUESTS      Culture result: NO GROWTH AFTER 14 HOURS     EKG, 12 LEAD, INITIAL    Collection Time: 01/08/21  6:51 PM   Result Value Ref Range    Ventricular Rate 111 BPM    Atrial Rate 120 BPM    QRS Duration 122 ms    Q-T Interval 356 ms    QTC Calculation (Bezet) 484 ms    Calculated R Axis -83 degrees    Calculated T Axis 76 degrees    Diagnosis       Atrial fibrillation with rapid ventricular response with premature   ventricular or aberrantly conducted complexes  Left axis deviation  Inferior infarct , age undetermined      Confirmed by Malena Pineda, P.VLara ([id number]) on 1/8/2021 11:35:55 PM     CULTURE, BLOOD    Collection Time: 01/08/21  7:00 PM    Specimen: Blood   Result Value Ref Range    Special Requests: NO SPECIAL REQUESTS      Culture result:        ONE OF TWO BOTTLES HAS BEEN FLAGGED POSITIVE BY INSTRUMENT. BOTTLE HAS BEEN SENT TO [facility] TO ASSESS FOR POSSIBLE GROWTH.  REMAINING BOTTLE(S) HAS/HAVE NO GROWTH SO FAR   METABOLIC PANEL, COMPREHENSIVE    Collection Time: 01/08/21  7:00 PM   Result Value Ref Range    Sodium 137 136 - 145 mmol/L    Potassium 3.5 3.5 - 5.1 mmol/L    Chloride 102 97 - 108 mmol/L    CO2 27 21 - 32 mmol/L    Anion gap 8 5 - 15 mmol/L    Glucose 163 (H) 65 - 100 mg/dL    BUN 28 (H) 6 - 20 MG/DL    Creatinine 1.67 (H) 0.55 - 1.02 MG/DL    BUN/Creatinine ratio 17 12 - 20      GFR est AA 35 (L) >60 ml/min/1.73m2    GFR est non-AA 29 (L) >60 ml/min/1.73m2    Calcium 9.0 8.5 - 10.1 MG/DL    Bilirubin, total 1.7 (H) 0.2 - 1.0 MG/DL    ALT (SGPT) 17 12 - 78 U/L    AST (SGOT) 34 15 - 37 U/L    Alk. phosphatase 98 45 - 117 U/L    Protein, total 9.0 (H) 6.4 - 8.2 g/dL    Albumin 3.6 3.5 - 5.0 g/dL    Globulin 5.4 (H) 2.0 - 4.0 g/dL    A-G Ratio 0.7 (L) 1.1 - 2.2     CBC WITH AUTOMATED DIFF    Collection Time: 01/08/21  7:00 PM   Result Value Ref Range    WBC 14.1 (H) 3.6 - 11.0 K/uL    RBC 5.08 3.80 - 5.20 M/uL    HGB 14.3 11.5 - 16.0 g/dL    HCT 44.2 35.0 - 47.0 %    MCV 87.0 80.0 - 99.0 FL    MCH 28.1 26.0 - 34.0 PG    MCHC 32.4 30.0 - 36.5 g/dL    RDW 15.7 (H) 11.5 - 14.5 %    PLATELET 692 048 - 302 K/uL    MPV 10.9 8.9 - 12.9 FL    NRBC 0.0 0  WBC    ABSOLUTE NRBC 0.00 0.00 - 0.01 K/uL    NEUTROPHILS 77 (H) 32 - 75 %    LYMPHOCYTES 12 12 - 49 %    MONOCYTES 10 5 - 13 %    EOSINOPHILS 0 0 - 7 %    BASOPHILS 0 0 - 1 %    IMMATURE GRANULOCYTES 1 (H) 0.0 - 0.5 %    ABS. NEUTROPHILS 10.8 (H) 1.8 - 8.0 K/UL    ABS. LYMPHOCYTES 1.7 0.8 - 3.5 K/UL    ABS. MONOCYTES 1.4 (H) 0.0 - 1.0 K/UL    ABS. EOSINOPHILS 0.0 0.0 - 0.4 K/UL    ABS. BASOPHILS 0.0 0.0 - 0.1 K/UL    ABS. IMM.  GRANS. 0.1 (H) 0.00 - 0.04 K/UL    DF AUTOMATED     TROPONIN I    Collection Time: 01/08/21  7:00 PM   Result Value Ref Range    Troponin-I, Qt. 1.02 (H) <0.05 ng/mL   PTT    Collection Time: 01/08/21  7:00 PM   Result Value Ref Range    aPTT 33.0 (H) 22.1 - 31.0 sec    aPTT, therapeutic range     58.0 - 77.0 SECS   PROTHROMBIN TIME + INR    Collection Time: 01/08/21  7:00 PM   Result Value Ref Range    INR 2.4 (H) 0.9 - 1.1      Prothrombin time 23.9 (H) 9.0 - 11.1 sec   PROCALCITONIN    Collection Time: 01/08/21  7:00 PM   Result Value Ref Range    Procalcitonin 0.33 ng/mL   MAGNESIUM    Collection Time: 01/08/21  7:00 PM   Result Value Ref Range    Magnesium 2.6 (H) 1.6 - 2.4 mg/dL   LD    Collection Time: 01/08/21  7:00 PM   Result Value Ref Range     (H) 81 - 246 U/L   D DIMER    Collection Time: 01/08/21  7:00 PM   Result Value Ref Range    D-dimer 3.66 (H) 0.00 - 0.65 mg/L FEU   SARS-COV-2    Collection Time: 01/08/21  7:00 PM   Result Value Ref Range    Specimen source Nasopharyngeal      Specimen source Nasopharyngeal      COVID-19 rapid test Not detected NOTD      Specimen type NP Swab      Health status Symptomatic Testing     FIBRINOGEN    Collection Time: 01/08/21  7:00 PM   Result Value Ref Range    Fibrinogen 552 (H) 200 - 475 mg/dL   POC LACTIC ACID    Collection Time: 01/08/21  7:08 PM   Result Value Ref Range    Lactic Acid (POC) 3.30 (HH) 0.40 - 2.00 mmol/L   POC LACTIC ACID    Collection Time: 01/08/21 10:02 PM   Result Value Ref Range    Lactic Acid (POC) 3.63 (HH) 0.40 - 2.00 mmol/L   TROPONIN I    Collection Time: 01/08/21 10:07 PM   Result Value Ref Range    Troponin-I, Qt. 2.28 (H) <0.05 ng/mL   URINALYSIS W/ REFLEX CULTURE    Collection Time: 01/08/21 10:55 PM    Specimen: Urine   Result Value Ref Range    Color YELLOW/STRAW      Appearance TURBID (A) CLEAR      Specific gravity >1.030 (H) 1.003 - 1.030    pH (UA) 5.0 5.0 - 8.0      Protein 100 (A) NEG mg/dL    Glucose Negative NEG mg/dL    Ketone Negative NEG mg/dL    Bilirubin Negative NEG      Blood LARGE (A) NEG      Urobilinogen 0.2 0.2 - 1.0 EU/dL    Nitrites Positive (A) NEG      Leukocyte Esterase LARGE (A) NEG      WBC  0 - 4 /hpf    RBC 5-10 0 - 5 /hpf    Epithelial cells MODERATE (A) FEW /lpf    Bacteria 2+ (A) NEG /hpf    UA:UC IF INDICATED URINE CULTURE ORDERED (A) CNI     PTT    Collection Time: 01/08/21 11:27 PM   Result Value Ref Range    aPTT 34.7 (H) 22.1 - 31.0 sec    aPTT, therapeutic range     58.0 - 77.0 SECS   CBC WITH AUTOMATED DIFF    Collection Time: 01/08/21 11:27 PM   Result Value Ref Range    WBC 11.0 3.6 - 11.0 K/uL    RBC 4.13 3.80 - 5.20 M/uL    HGB 11.7 11.5 - 16.0 g/dL    HCT 36.3 35.0 - 47.0 %    MCV 87.9 80.0 - 99.0 FL    MCH 28.3 26.0 - 34.0 PG    MCHC 32.2 30.0 - 36.5 g/dL    RDW 15.7 (H) 11.5 - 14.5 %    PLATELET 875 318 - 136 K/uL    MPV 10.6 8.9 - 12.9 FL    NRBC 0.0 0  WBC    ABSOLUTE NRBC 0.00 0.00 - 0.01 K/uL    NEUTROPHILS 76 (H) 32 - 75 %    LYMPHOCYTES 15 12 - 49 %    MONOCYTES 8 5 - 13 %    EOSINOPHILS 0 0 - 7 %    BASOPHILS 0 0 - 1 %    IMMATURE GRANULOCYTES 1 (H) 0.0 - 0.5 %    ABS. NEUTROPHILS 8.4 (H) 1.8 - 8.0 K/UL    ABS. LYMPHOCYTES 1.7 0.8 - 3.5 K/UL    ABS. MONOCYTES 0.9 0.0 - 1.0 K/UL    ABS. EOSINOPHILS 0.0 0.0 - 0.4 K/UL    ABS. BASOPHILS 0.0 0.0 - 0.1 K/UL    ABS. IMM.  GRANS. 0.1 (H) 0.00 - 0.04 K/UL    DF AUTOMATED     PTT    Collection Time: 01/08/21 11:45 PM   Result Value Ref Range    aPTT 34.7 (H) 22.1 - 31.0 sec    aPTT, therapeutic range     58.0 - 77.0 SECS   PROTHROMBIN TIME + INR    Collection Time: 01/08/21 11:45 PM   Result Value Ref Range    INR 2.4 (H) 0.9 - 1.1      Prothrombin time 24.6 (H) 9.0 - 11.1 sec   D DIMER    Collection Time: 01/08/21 11:45 PM   Result Value Ref Range    D-dimer 3.09 (H) 0.00 - 0.65 mg/L FEU   FIBRINOGEN    Collection Time: 01/08/21 11:45 PM   Result Value Ref Range    Fibrinogen 455 200 - 475 mg/dL   EKG, 12 LEAD, INITIAL    Collection Time: 01/09/21  2:47 AM   Result Value Ref Range    Ventricular Rate 126 BPM    Atrial Rate 68 BPM    QRS Duration 124 ms    Q-T Interval 354 ms    QTC Calculation (Bezet) 512 ms    Calculated R Axis -87 degrees    Calculated T Axis 82 degrees    Diagnosis       Undetermined rhythm  Left axis deviation  Anteroseptal infarct (cited on or before 25-JAN-2018)  When compared with ECG of 08-JAN-2021 18:51,  Current undetermined rhythm precludes rhythm comparison, needs review  Criteria for Inferior infarct are no longer present  Questionable change in initial forces of Lateral leads     DUPLEX CAROTID BILATERAL    Collection Time: 01/09/21  4:00 AM   Result Value Ref Range    Right subclavian sys 71.2 cm/s    RIGHT SUBCLAVIAN ARTERY D 0.00 cm/s    Right cca dist sys 31.0 cm/s Right CCA dist anderson 6.5 cm/s    Right CCA prox sys 34.3 cm/s    Right CCA prox anderson 16.5 cm/s    Right ICA dist anderson 6.7 cm/s    Right ICA prox sys 22.7 cm/s    Right ICA prox anderson 7.2 cm/s    Right eca sys 58.6 cm/s    RIGHT EXTERNAL CAROTID ARTERY D 7.69 cm/s    Right vertebral sys 54.7 cm/s    RIGHT VERTEBRAL ARTERY D 9.03 cm/s    Right ICA/CCA sys 0.64     Left subclavian sys 50.3 cm/s    LEFT SUBCLAVIAN ARTERY D 0.00 cm/s    Left CCA dist sys 67.7 cm/s    Left CCA dist anderson 7.9 cm/s    Left CCA prox sys 98.3 cm/s    Left CCA prox anderson 7.7 cm/s    Left ICA prox sys 80.6 cm/s    Left ICA prox anderson 14.4 cm/s    Left ECA sys 129.8 cm/s    LEFT EXTERNAL CAROTID ARTERY D 27.64 cm/s    Left vertebral sys 76.4 cm/s    LEFT VERTEBRAL ARTERY D 22.99 cm/s    Left ICA/CCA sys 1.19    PTT    Collection Time: 01/09/21  4:19 AM   Result Value Ref Range    aPTT 33.0 (H) 22.1 - 31.0 sec    aPTT, therapeutic range     58.0 - 02.5 SECS   METABOLIC PANEL, BASIC    Collection Time: 01/09/21  4:19 AM   Result Value Ref Range    Sodium 138 136 - 145 mmol/L    Potassium 3.8 3.5 - 5.1 mmol/L    Chloride 103 97 - 108 mmol/L    CO2 26 21 - 32 mmol/L    Anion gap 9 5 - 15 mmol/L    Glucose 180 (H) 65 - 100 mg/dL    BUN 31 (H) 6 - 20 MG/DL    Creatinine 1.72 (H) 0.55 - 1.02 MG/DL    BUN/Creatinine ratio 18 12 - 20      GFR est AA 33 (L) >60 ml/min/1.73m2    GFR est non-AA 28 (L) >60 ml/min/1.73m2    Calcium 8.3 (L) 8.5 - 10.1 MG/DL   MAGNESIUM    Collection Time: 01/09/21  4:19 AM   Result Value Ref Range    Magnesium 2.3 1.6 - 2.4 mg/dL   CBC WITH AUTOMATED DIFF    Collection Time: 01/09/21  4:19 AM   Result Value Ref Range    WBC 22.0 (H) 3.6 - 11.0 K/uL    RBC 4.62 3.80 - 5.20 M/uL    HGB 12.7 11.5 - 16.0 g/dL    HCT 41.7 35.0 - 47.0 %    MCV 90.3 80.0 - 99.0 FL    MCH 27.5 26.0 - 34.0 PG    MCHC 30.5 30.0 - 36.5 g/dL    RDW 15.8 (H) 11.5 - 14.5 %    PLATELET 318 647 - 023 K/uL    MPV 10.8 8.9 - 12.9 FL    NRBC 0.0 0  WBC ABSOLUTE NRBC 0.00 0.00 - 0.01 K/uL    NEUTROPHILS 83 (H) 32 - 75 %    BAND NEUTROPHILS 2 %    LYMPHOCYTES 11 (L) 12 - 49 %    MONOCYTES 4 (L) 5 - 13 %    EOSINOPHILS 0 0 - 7 %    BASOPHILS 0 0 - 1 %    IMMATURE GRANULOCYTES 0 0.0 - 0.5 %    ABS. NEUTROPHILS 18.7 (H) 1.8 - 8.0 K/UL    ABS. LYMPHOCYTES 2.4 0.8 - 3.5 K/UL    ABS. MONOCYTES 0.9 0.0 - 1.0 K/UL    ABS. EOSINOPHILS 0.0 0.0 - 0.4 K/UL    ABS. BASOPHILS 0.0 0.0 - 0.1 K/UL    ABS. IMM. GRANS. 0.0 0.00 - 0.04 K/UL    DF MANUAL      RBC COMMENTS ANISOCYTOSIS  1+       TROPONIN I    Collection Time: 01/09/21  4:19 AM   Result Value Ref Range    Troponin-I, Qt. 3.50 (H) <0.05 ng/mL   PROTHROMBIN TIME + INR    Collection Time: 01/09/21  4:19 AM   Result Value Ref Range    INR 2.4 (H) 0.9 - 1.1      Prothrombin time 24.6 (H) 9.0 - 11.1 sec   NT-PRO BNP    Collection Time: 01/09/21  4:19 AM   Result Value Ref Range    NT pro-BNP 13,892 (H) <450 PG/ML   POC LACTIC ACID    Collection Time: 01/09/21  4:25 AM   Result Value Ref Range    Lactic Acid (POC) 4.26 (HH) 0.40 - 2.00 mmol/L   POC EG7    Collection Time: 01/09/21  8:53 AM   Result Value Ref Range    Calcium, ionized (POC) 1.08 (L) 1.12 - 1.32 mmol/L    pH (POC) 7.51 (H) 7.35 - 7.45      pCO2 (POC) 29.7 (L) 35.0 - 45.0 MMHG    pO2 (POC) 82 80 - 100 MMHG    HCO3 (POC) 23.7 22 - 26 MMOL/L    Base excess (POC) 1 mmol/L    sO2 (POC) 97 92 - 97 %    Site LEFT RADIAL      Device: NASAL CANNULA      Flow rate (POC) 4 L/M    Allens test (POC) YES      Specimen type (POC) ARTERIAL      Total resp.  rate 16     TROPONIN I    Collection Time: 01/09/21  9:43 AM   Result Value Ref Range    Troponin-I, Qt. 5.76 (H) <0.05 ng/mL   SARS-COV-2    Collection Time: 01/09/21  9:43 AM   Result Value Ref Range    Specimen source Nasopharyngeal      SARS-CoV-2 PENDING     SARS-CoV-2 PENDING     Specimen source NP SWAB     COVID-19 rapid test PENDING     Specimen type NP Swab      Health status PENDING     COVID-19 PENDING    LACTIC ACID Collection Time: 01/09/21  9:43 AM   Result Value Ref Range    Lactic acid 2.1 (HH) 0.4 - 2.0 MMOL/L

## 2021-01-09 NOTE — PROGRESS NOTES
Pharmacy Automatic Renal Dosing Protocol - Antimicrobials    Indication for Antimicrobials: UTI    Current Regimen of Each Antimicrobial:  Cefepime 2 g iv q24h (Start date ; day 1)    Previous Antimicrobial Therapy:    Significant Cultures:    blood: pending   urine: pending    Radiology / Imaging results: (X-ray, CT scan or MRI):     Paralysis, amputations, malnutrition:     Labs:  Recent Labs     21  0419 21  2327 21  1900   CREA 1.72*  --  1.67*   BUN 31*  --  28*   WBC 22.0* 11.0 14.1*     Temp (24hrs), Av.4 °F (37.4 °C), Min:97.6 °F (36.4 °C), Max:103.2 °F (39.6 °C)      Is the Patient on Dialysis? No    Creatinine Clearance (mL/min):   CrCl (Actual Body Weight): 37.8  CrCl (Adjusted Body Weight): 36.1  CrCl (Ideal Body Weight): 34.9    Impression/Plan:   Empiric cefepime renally adjusted to 2g IV q24h  BMP, CBC   Antimicrobial stop date: 5 days      Pharmacy will follow daily and adjust medications as appropriate for renal function and/or serum levels.     Thank you,  Kai Damon, PHARMD

## 2021-01-09 NOTE — PROGRESS NOTES
Preliminary Findings are consistent with right internal carotid occlusion (mid/distal) and <50% stenosis of the left internal carotid. Vertebrals are patent with antegrade flow.   Difficult exam due to patient heavy breathing and constant movement during exam.

## 2021-01-09 NOTE — H&P
Hospitalist Admission Note    NAME: Shy Banks   :  1925   MRN:  [id number]     Date/Time:  2021 11:09 PM    Patient PCP: Dacia Archer MD  ______________________________________________________________________  Given the patient's current clinical presentation, I have a high level of concern for decompensation if discharged from the emergency department. Complex decision making was performed, which includes reviewing the patient's available past medical records, laboratory results, and x-ray films. My assessment of this patient's clinical condition and my plan of care is as follows. Assessment / Plan:    SIRS, POA  So far no clear source of infection. Follow blood cultures  Follow-up urinalysis, was just sent during my evaluation  Continue broad spectrum antibiotics until blood cultures are back    Elevated troponin, POA  Pt denies any Chest pain/SOB/ARREOLA  INR is therapeutic, check in AM  Trend troponin  Echocardiogram  Repeat EKG in a.m. Cardiology consulted by ED  Start Asa/Statin, Cont BB    Episode of Confusion, POA  Resolved  Could be explained by active infection, will get Brain MRI to be on the safe side given Chronic A. Fib history     Mild PRACHI on CKD stage III  Hold Lasix for today and re-assess in AM  Avoid nephrotoxic meds    A. Fib with RVR   Hold coumadin as per Pharm D protocol  Resume Atenolol    Right breast mass  She has is for years and declines any further work-up/treatment/surgical interventions    Code Status: DNR/RNI    DVT Prophylaxis: Coumadin  GI Prophylaxis: not indicated     Baseline: independent       Subjective:   CHIEF COMPLAINT: Confusion/slurred speech    HISTORY OF PRESENT ILLNESS:     Terrance Raymond is a 80 y.o.    female with past medical history of arthritis, atrial fibrillation on Coumadin, Rt breast mass, COPD, CKD stage III, hypertension, chronic venous stasis, spinal stenosis, who presents with an episode of confusion. Patient from [facility], was at her usual state of health until tonight when he was found to have some slurred speech and was confused, as per report. When EMS arrived, patient had no focal neurological deficit and she was back at her baseline. During my evaluation, patient was alert and oriented x3, was able to get off bedside commode and get back to her bed with minimal assistance, she denies any complaints and she is telling her that she got stuck in her wheelchair and could not get up and then she was evaluated and sent to the hospital for further management, and she did not want to come to the hospital.  Patient denies cough, shortness of breath, fever, chills, abdominal pain, chest pain, nausea/vomiting/diarrhea, change in urinary habits, falls, worsening lower extremity swelling. Patient stated that she has been treated for UTI 3 weeks ago. Denies any sick contacts. We were asked to admit for work up and evaluation of the above problems. At the emergency room, heart rate 115, maximum temperature 103.2, breathing at 26 breaths/min with sats 95% on room air, /76. Blood work remarkable for lactic acid of 3.3, WBC 14.1, INR 2.4, fibrinogen 552, D-dimer 3.6, BUN 28, creatinine 1.6, bilirubin 1.7, , troponin I 0.02. Rapid Covid test negative. EKG with A. fib RVR with rates at 111 left axis deviation, left axis deviation.       Past Medical History:   Diagnosis Date    Arthritis     Atrial fibrillation ([address] 12/2015    post hip surgery    Chronic obstructive pulmonary disease (HCC)     Gastrointestinal disorder     gallstones    Hypertension     Iron deficiency     Leg ulcer ([address]     Macular degeneration     OA (osteoarthritis) of shoulder     Spinal stenosis     lumbar        Past Surgical History:   Procedure Laterality Date    HX HEENT      cataract surgery with lens implants    HX ORTHOPAEDIC      right hip surgery       Social History     Tobacco Use  Smoking status: Former Smoker     Years: 60.00     Quit date: 1991     Years since quittin.7    Smokeless tobacco: Never Used   Substance Use Topics    Alcohol use: Yes        Family History   Problem Relation Age of Onset    Cancer Mother         jaw;    Donaldo [place] Stroke Mother     Other Father         rheumatic fever     Allergies   Allergen Reactions    Latex Itching        Prior to Admission medications    Medication Sig Start Date End Date Taking? Authorizing Provider   acetaminophen (TYLENOL) 500 mg tablet Take 1,000 mg by mouth nightly. Other, MD Demetrius   atenolol (TENORMIN) 50 mg tablet TAKE 1 TABLET BY MOUTH AT BEDTIME 17   Ron Escoto Res, MD   furosemide (LASIX) 40 mg tablet TAKE ONE TABLET BY MOUTH DAILY 16   Ron Escoto Res, MD       REVIEW OF SYSTEMS:     Total of 12 systems reviewed and neg except for the above       Objective:   VITALS:    Visit Vitals  BP (!) 151/67   Pulse (!) 115   Temp 99.1 °F (37.3 °C)   Resp 24   Ht 5' 7\" (1.702 m)   Wt 86.2 kg (190 lb)   SpO2 94%   BMI 29.76 kg/m²       PHYSICAL EXAM:    General:    Alert, cooperative, no distress, appears stated age. HEENT: Atraumatic, anicteric sclerae, pink conjunctivae     No oral ulcers, mucosa moist, throat clear, dentition fair  Neck:  Supple, symmetrical,  thyroid: non tender  Lungs:   Clear to auscultation bilaterally. No Wheezing or Rhonchi. No rales. Chest wall:  No tenderness  No Accessory muscle use. Heart:   Irregularly irregular,  No  Murmur. Mild bilateral lower extremity edema with chronic venous stasis  Abdomen:   Soft, non-tender. Not distended. Bowel sounds normal  Extremities: No cyanosis. No clubbing,      Skin turgor normal, Capillary refill normal, Radial dial pulse 2+  Skin:     Not pale. Not Jaundiced  No rashes   Psych:  Good insight. Not depressed. Not anxious or agitated. Neurologic: EOMs intact. No facial asymmetry. No aphasia or slurred speech.  Symmetrical strength, Sensation grossly intact. Alert and oriented X 4.     _______________________________________________________________________  Care Plan discussed with:    Comments   Patient x    Family      RN x    Care Manager                    Consultant:      _______________________________________________________________________  Expected  Disposition:   Assisted living x   HH/PT/OT/RN    SNF/LTC    DEDE    ________________________________________________________________________  TOTAL TIME:  [address] [place] Provided     Minutes non procedure based      Comments    x Reviewed previous records   >50% of visit spent in counseling and coordination of care x Discussion with patient and/or family and questions answered       ________________________________________________________________________  Signed: Quincy Alejandre MD    Procedures: see electronic medical records for all procedures/Xrays and details which were not copied into this note but were reviewed prior to creation of Plan.     LAB DATA REVIEWED:    Recent Results (from the past 24 hour(s))   EKG, 12 LEAD, INITIAL    Collection Time: 01/08/21  6:51 PM   Result Value Ref Range    Ventricular Rate 111 BPM    Atrial Rate 120 BPM    QRS Duration 122 ms    Q-T Interval 356 ms    QTC Calculation (Bezet) 484 ms    Calculated R Axis -83 degrees    Calculated T Axis 76 degrees    Diagnosis       Atrial fibrillation with rapid ventricular response with premature   ventricular or aberrantly conducted complexes  Left axis deviation  Inferior infarct , age undetermined  Anterolateral infarct (cited on or before 25-JAN-2018)  When compared with ECG of 27-JAN-2018 06:05,  Left posterior fascicular block is no longer present  Inferior infarct is now present  Questionable change in initial forces of Anterolateral leads  ST elevation now present in Anterior leads  Nonspecific T wave abnormality no longer evident in Inferior leads     METABOLIC PANEL, COMPREHENSIVE    Collection Time: 01/08/21  7:00 PM   Result Value Ref Range    Sodium 137 136 - 145 mmol/L    Potassium 3.5 3.5 - 5.1 mmol/L    Chloride 102 97 - 108 mmol/L    CO2 27 21 - 32 mmol/L    Anion gap 8 5 - 15 mmol/L    Glucose 163 (H) 65 - 100 mg/dL    BUN 28 (H) 6 - 20 MG/DL    Creatinine 1.67 (H) 0.55 - 1.02 MG/DL    BUN/Creatinine ratio 17 12 - 20      GFR est AA 35 (L) >60 ml/min/1.73m2    GFR est non-AA 29 (L) >60 ml/min/1.73m2    Calcium 9.0 8.5 - 10.1 MG/DL    Bilirubin, total 1.7 (H) 0.2 - 1.0 MG/DL    ALT (SGPT) 17 12 - 78 U/L    AST (SGOT) 34 15 - 37 U/L    Alk. phosphatase 98 45 - 117 U/L    Protein, total 9.0 (H) 6.4 - 8.2 g/dL    Albumin 3.6 3.5 - 5.0 g/dL    Globulin 5.4 (H) 2.0 - 4.0 g/dL    A-G Ratio 0.7 (L) 1.1 - 2.2     CBC WITH AUTOMATED DIFF    Collection Time: 01/08/21  7:00 PM   Result Value Ref Range    WBC 14.1 (H) 3.6 - 11.0 K/uL    RBC 5.08 3.80 - 5.20 M/uL    HGB 14.3 11.5 - 16.0 g/dL    HCT 44.2 35.0 - 47.0 %    MCV 87.0 80.0 - 99.0 FL    MCH 28.1 26.0 - 34.0 PG    MCHC 32.4 30.0 - 36.5 g/dL    RDW 15.7 (H) 11.5 - 14.5 %    PLATELET 373 787 - 199 K/uL    MPV 10.9 8.9 - 12.9 FL    NRBC 0.0 0  WBC    ABSOLUTE NRBC 0.00 0.00 - 0.01 K/uL    NEUTROPHILS 77 (H) 32 - 75 %    LYMPHOCYTES 12 12 - 49 %    MONOCYTES 10 5 - 13 %    EOSINOPHILS 0 0 - 7 %    BASOPHILS 0 0 - 1 %    IMMATURE GRANULOCYTES 1 (H) 0.0 - 0.5 %    ABS. NEUTROPHILS 10.8 (H) 1.8 - 8.0 K/UL    ABS. LYMPHOCYTES 1.7 0.8 - 3.5 K/UL    ABS. MONOCYTES 1.4 (H) 0.0 - 1.0 K/UL    ABS. EOSINOPHILS 0.0 0.0 - 0.4 K/UL    ABS. BASOPHILS 0.0 0.0 - 0.1 K/UL    ABS. IMM.  GRANS. 0.1 (H) 0.00 - 0.04 K/UL    DF AUTOMATED     TROPONIN I    Collection Time: 01/08/21  7:00 PM   Result Value Ref Range    Troponin-I, Qt. 1.02 (H) <0.05 ng/mL   PTT    Collection Time: 01/08/21  7:00 PM   Result Value Ref Range    aPTT 33.0 (H) 22.1 - 31.0 sec    aPTT, therapeutic range     58.0 - 77.0 SECS   PROTHROMBIN TIME + INR    Collection Time: 01/08/21  7:00 PM   Result Value Ref Range    INR 2.4 (H) 0.9 - 1.1      Prothrombin time 23.9 (H) 9.0 - 11.1 sec   PROCALCITONIN    Collection Time: 01/08/21  7:00 PM   Result Value Ref Range    Procalcitonin 0.33 ng/mL   MAGNESIUM    Collection Time: 01/08/21  7:00 PM   Result Value Ref Range    Magnesium 2.6 (H) 1.6 - 2.4 mg/dL   LD    Collection Time: 01/08/21  7:00 PM   Result Value Ref Range     (H) 81 - 246 U/L   D DIMER    Collection Time: 01/08/21  7:00 PM   Result Value Ref Range    D-dimer 3.66 (H) 0.00 - 0.65 mg/L FEU   SARS-COV-2    Collection Time: 01/08/21  7:00 PM   Result Value Ref Range    Specimen source Nasopharyngeal      Specimen source Nasopharyngeal      COVID-19 rapid test Not detected NOTD      Specimen type NP Swab      Health status Symptomatic Testing     FIBRINOGEN    Collection Time: 01/08/21  7:00 PM   Result Value Ref Range    Fibrinogen 552 (H) 200 - 475 mg/dL   POC LACTIC ACID    Collection Time: 01/08/21  7:08 PM   Result Value Ref Range    Lactic Acid (POC) 3.30 (HH) 0.40 - 2.00 mmol/L   POC LACTIC ACID    Collection Time: 01/08/21 10:02 PM   Result Value Ref Range    Lactic Acid (POC) 3.63 (HH) 0.40 - 2.00 mmol/L   TROPONIN I    Collection Time: 01/08/21 10:07 PM   Result Value Ref Range    Troponin-I, Qt. 2.28 (H) <0.05 ng/mL

## 2021-01-09 NOTE — PROGRESS NOTES
Occupational Therapy note:    Order received and chart reviewed. Pt just transferred from ED. Noted elevated troponin (steadily increasing), increased RR/oxygen needs (pt recently on non-rebreather), and low grade temp.  Will hold at this time until pt more medically stable and appropriate for OT eval.     Kayla Acevedo, OTR/L

## 2021-01-09 NOTE — PROGRESS NOTES
Pharmacy Automatic Renal Dosing Protocol - Antimicrobials    Indication for Antimicrobials: Sepsis     Current Regimen of Each Antimicrobial:  Vancomycin 1750 mg IV once, then 1250 mg every 36 hours (Start Date ; Day # 2)  Cefepime 2 gm IV q24h - start  - day #1    Previous Antimicrobial Therapy:  Zosyn 3.375 gm IV every 12 hours  (Start Date ; Day # 2)  Goal Level: VANCOMYCIN TROUGH GOAL RANGE    Vancomycin Trough: 15 - 20 mcg/mL  (AUC: 400 - 600 mg/hr/Liter/day)     Date Dose & Interval Measured (mcg/mL) Extrapolated (mcg/mL)                       Date & time of next level:     Significant Cultures:    blood: pending   urine: pending  Radiology / Imaging results: (X-ray, CT scan or MRI):    CTA chest - No acute findings suspected   cxry - Chronic interstitial lung disease. No acute process on portable chest view. No significant change since 2018    Paralysis, amputations, malnutrition: not noted    Labs:  Recent Labs     21  0419 21  2327 21  1900   CREA 1.72*  --  1.67*   BUN 31*  --  28*   WBC 22.0* 11.0 14.1*     Temp (24hrs), Av.4 °F (37.4 °C), Min:97.6 °F (36.4 °C), Max:103.2 °F (39.6 °C)      Is the Patient on Dialysis? No    Creatinine Clearance (mL/min):   CrCl (Actual Body Weight): 26.6  CrCl (Adjusted Body Weight): 22.1  CrCl (Ideal Body Weight): 19.0    Impression/Plan:   Will adjust vancomycin to 1000 mg IV q36h for expected trough of ~14 and AUC of 484  Daily bmp   Antimicrobial stop date TBD     Pharmacy will follow daily and adjust medications as appropriate for renal function and/or serum levels.     Thank you,  Cristian Martins, PHARMD

## 2021-01-09 NOTE — ROUTINE PROCESS
TRANSFER - IN REPORT: 
 
Verbal report received from [facility] (name) on Deanne Root  being received from ED (unit) for routine progression of care Report consisted of patients Situation, Background, Assessment and  
Recommendations(SBAR). Information from the following report(s) ED Summary and Cardiac Rhythm Afib 100s  was reviewed with the receiving nurse. Opportunity for questions and clarification was provided. Assessment completed upon patients arrival to unit and care assumed.

## 2021-01-09 NOTE — ED NOTES
1950: Pt's oral temp is 99.1, tylenol still given as per order. 2000: Pt's trop is 1.02, notified Dr. Primitivo Kay. 0000: attempted to call [facility] to get pt's updated medication information, but no success. Notified Dr. Nabila Durán and Pharmacy. 0219: Called Nursing supervisor for duplex carotid test.       4547: TRANSFER - OUT REPORT:    [facility]) for routine progression of care       Report consisted of patients Situation, Background, Assessment and   Recommendations(SBAR). Information from the following report(s) SBAR, Kardex, ED Summary, Intake/Output, MAR, Recent Results and Cardiac Rhythm A-FIB RVR was reviewed with the receiving nurse. Lines:   Peripheral IV 01/08/21 Left Wrist (Active)   Site Assessment Clean, dry, & intact 01/08/21 1908   Phlebitis Assessment 0 01/08/21 1908   Infiltration Assessment 0 01/08/21 1908   Dressing Status Clean, dry, & intact 01/08/21 1908   Dressing Type Transparent 01/08/21 1908       Peripheral IV 01/08/21 Right Antecubital (Active)   Site Assessment Clean, dry, & intact 01/08/21 1908   Phlebitis Assessment 0 01/08/21 1908   Infiltration Assessment 0 01/08/21 1908   Dressing Status Clean, dry, & intact 01/08/21 1908   Dressing Type Transparent 01/08/21 1908        Opportunity for questions and clarification was provided.       Patient transported with:   Monitor  O2 @ 12 liters  Registered Nurse

## 2021-01-09 NOTE — PROGRESS NOTES
Pharmacy Daily Dosing of Warfarin    Indication: a fib    Goal INR: 2-3    PTA Warfarin Dose: unknown (MAR was not sent w/ pt from [facility], patient's family did not know per ED RN)     Concurrent anticoagulants: none    Concurrent antiplatelet: aspirin    Major Interacting Medications   Drugs that may increase INR: no major  Drugs that may decrease INR: no major    Conditions that may increase/decrease INR (CHF, C. diff, cirrhosis, thyroid disorder, hypoalbuminemia):     Labs:  Recent Labs     01/09/21  0419 01/08/21  2345 01/08/21  2327 01/08/21  1900   INR 2.4* 2.4*  --  2.4*   HGB 12.7  --  11.7 14.3     --  165 203   TBILI  --   --   --  1.7*   ALB  --   --   --  3.6     Impression/Plan:   No dose given yesterday and INR stable; no dose today  Pharmacy working on obtaining MAR from facility patient was at prior to arrival here  Daily INR  CBC w/o diff QOD     Pharmacy will follow daily and adjust the dose as appropriate.     Thanks  Rachelle Oleary, PHARMD

## 2021-01-09 NOTE — CONSULTS
Date of Consultation:  January 9, 2021    Referring Physician: Valdez Nguyen MD     Reason for Consultation:  Confusion     Chief Complaint   Patient presents with    Dysarthria       History of Present Illness:   Mihir Sams is a 80 y.o. female with a history of atrial fibrillation on Coumadin, COPD, CKD stage III, hypertension who is currently admitted to the hospital after she developed confusion noted while at her place of residence. It appears that she had been at her baseline state of health and she had not been answering the phone. EMS was called to check on patient and she was slightly confused at that time. She was brought to the emergency room for further evaluation. There she was found to be completely oriented however had a temperature of 103 rectally. She was admitted to the hospital for further work-up. This morning on my evaluation she appeared to be back at her baseline state of health. She did not have any difficulty with her speech and was able to answer all orientation questions properly. When asked her why she was at the hospital she reports that she was unable to answer certain questions such as who the present was yesterday when EMS was checking on her. Today she has no complaints and reports she feels fine.   Past Medical History:   Diagnosis Date    Arthritis     Atrial fibrillation ([address] 12/2015    post hip surgery    Chronic obstructive pulmonary disease (HCC)     Gastrointestinal disorder     gallstones    Hypertension     Iron deficiency     Leg ulcer ([address]     Macular degeneration     OA (osteoarthritis) of shoulder     Spinal stenosis     lumbar        Past Surgical History:   Procedure Laterality Date    HX HEENT      cataract surgery with lens implants    HX ORTHOPAEDIC      right hip surgery        Family History   Problem Relation Age of Onset    Cancer Mother         jaw;     Stroke Mother     Other Father         rheumatic fever        Social History     Tobacco Use    Smoking status: Former Smoker     Years: 60.00     Quit date: 1991     Years since quittin.7    Smokeless tobacco: Never Used   Substance Use Topics    Alcohol use: Yes        Allergies   Allergen Reactions    Latex Itching        Prior to Admission medications    Medication Sig Start Date End Date Taking? Authorizing Provider   acetaminophen (TYLENOL) 500 mg tablet Take 1,000 mg by mouth nightly. Demetrius Meza MD   atenolol (TENORMIN) 50 mg tablet TAKE 1 TABLET BY MOUTH AT BEDTIME 17   Vincent Escoto MD   furosemide (LASIX) 40 mg tablet TAKE ONE TABLET BY MOUTH DAILY 16   Bina Shaffer MD       Review of Systems:    General, constitutional: negative  Eyes, vision: negative  Ears, nose, throat: negative  Cardiovascular, heart: negative  Respiratory: negative  Gastrointestinal: negative  Genitourinary: negative  Musculoskeletal: negative  Skin and integumentary: negative  Psychiatric: negative  Endocrine: negative  Neurological: negative, except for HPI  Hematologic/lymphatic: negative  Allergy/immunology: negative    PHYSICAL EXAMINATION:   Visit Vitals  BP (!) 142/65   Pulse 91   Temp 97.2 °F (36.2 °C)   Resp 24   Ht 5' 7\" (1.702 m)   Wt 190 lb (86.2 kg)   SpO2 92%   BMI 29.76 kg/m²       Physical Exam:  General:  no acute distress  Neck: no carotid bruits  Lungs: clear to auscultation  Heart:  no murmurs, regular rate and rhythm   Lower extremity: no edema    Neurological exam:  Mental Status: Awake, alert, oriented to person, place and time  Attention and Concentration: Unable to state the days of the week backwards however said them forwards. Speech and Language: No dysarthria.  Able to name, repeat and follow commands   Fund of knowledge was preserved    Cranial nerves: II-XII:  Pupils equal and reactive, visual fields intact by confrontation   Extraocular movements intact, no evidence of nystagmus or ptosis   Facial sensation intact   Facial movements symmetric   Hearing intact to soft rub bilaterally   Shoulder shrug symmetric and strong   Tongue protrusion full and midline without fasciculation or atrophy    Motor:   Normal tone and Bulk   Drift: No evidence of pronator drift     Strength testing:   deltoid triceps biceps Wrist ext. Wrist flex. intrinsics   Right 5 5 5 5 5 5   Left 5 5 5 5 5 5      Hip flex. Hip ext. Knee ext.  Knee flex Dorsi flex Plantar flex   Right  5 5 5 5 5 5   Left  5 5 5 5 5 5       Sensory:  Sensation was intact to light touch and pinprick.  There is no extinction.    Reflexes:   Biceps Triceps  Brachiorad Patellar Achilles Plantar Hoffmans   Right  2 2 2 2 1 Down Neg   Left  2 2 2 2 1 Down Neg      Cerebellar testing: Finger-nose-finger was intact.  There is no dysmetria.    Romberg: absent    Gait: Deferred given A. fib with RVR.    Data:     Lab Results   Component Value Date/Time    Hemoglobin A1c 6.8 (H) 04/02/2016 07:36 AM    Sodium 138 01/09/2021 04:19 AM    Potassium 3.8 01/09/2021 04:19 AM    Chloride 103 01/09/2021 04:19 AM    Glucose 180 (H) 01/09/2021 04:19 AM    BUN 31 (H) 01/09/2021 04:19 AM    Creatinine 1.72 (H) 01/09/2021 04:19 AM    Calcium 8.3 (L) 01/09/2021 04:19 AM    WBC 22.0 (H) 01/09/2021 04:19 AM    HCT 41.7 01/09/2021 04:19 AM    HGB 12.7 01/09/2021 04:19 AM    PLATELET 188 01/09/2021 04:19 AM       Imaging:    Results from Hospital Encounter encounter on 11/02/12   MRI LUMB SPINE WO CONT    Narrative **Final Report**       ICD Codes / Adm.Diagnosis: 724.02  724.4 / Spinal stenosis, lumbar region    Thoracic or lumbosacral neur  Examination:  MRI L SPINE WO CON  - [id number] - Nov 2 2012  3:44PM  Accession No:  [id number]  Reason:  Stenosis, radiculitis      REPORT:  EXAM:  MRI L SPINE WO CON    INDICATION:  Stenosis, radiculitis    COMPARISON: No prior MRI studies  TECHNIQUE:  MR imaging of the lumbar spine was performed with sagittal T1, T2, STIR;    axial T1, T2, the study was performed on the low field open  MRI unit because   of the patient's claustrophobia. FINDINGS:  .  Vertebral body heights are maintained. There are no suspicious marrow   abnormalities. The conus medullaris terminates at T12-L1. There is a right   renal cyst.  There is a probable sebaceous cyst within the superficial soft   tissues of the back in the left parasagittal region. T12/L1:  No significant abnormalities    L1/2:  No significant abnormalities    L2/3:  Mild bulging disc. Mild facet hypertrophy. No stenosis or foraminal   narrowing. L3/4: Disc desiccation and moderate loss of height of the disc. Mild   overall disc bulge with superimposed mild central disc protrusion. Moderate   facet and ligamentous hypertrophy. Severe central spinal stenosis. Mild   right foraminal stenosis. L4/5:  Severe bilateral facet hypertrophy with grade 1 spondylolisthesis. Mild bulging of the disc. Mild central spinal stenosis. Mild bilateral   foraminal stenosis. L5/S1: Relatively small disc on a congenital basis No stenosis or foraminal   narrowing. IMPRESSION:  1. Degenerative spondylolisthesis at L3-4 with severe stenosis. 2.  Degenerative spondylolisthesis at L4-5 with mild stenosis. Signing/Reading Doctor: Charles Hanks ([id number])    Approved: Charles Hanks ([id number])  Nov 2 2012  5:52PM                                   Results from Hospital Encounter encounter on 01/08/21   CT ABD PELV W CONT    Narrative Clinical indication: Shortness of breath, fever, increased bilirubin Cuco. Sepsis. Localizer obtained without contrast at the level of the pulmonary arteries. Fast  injection rate of 100 cc of Isovue-370 with review of the raw data and MIP  reconstructions. No prior. CT dose reduction was achieved through the use of a  standardized protocol tailored for this examination and automatic exposure  control for dose modulation . Hammad Rangel No evidence for pulmonary emboli. No mediastinal masses or adenopathy. No  pericardial pleural effusion no shift or pneumothorax. Diffuse moderate  atherosclerosis of the thoracic aorta. No lung infiltrates or masses. Incidental  suspicion of a right breast mass. Impression IMPRESSION: No acute intrathoracic findings. Incidental right breast mass    Axial CT scan of the abdomen and pelvis obtained after intravenous injection 100  cc of Isovue-370 no oral contrast.  Gallstones are seen in a nondistended gallbladder. Liver and spleen appear normal in size. Pancreas appears unremarkable. There is  no bowel wall thickening or obstruction. Mild diverticulosis of the sigmoid. The  bladder is midline, uterus appears unremarkable. Prior right hip surgery. No  free air of free fluid. IMPRESSION: No acute findings suspected. Few incidentals as above. IMPRESSION/RECOMMENDATIONS:  Tracey Willis is a 80 y.o. female with a history of atrial fibrillation on Coumadin, COPD, CKD stage III, hypertension who is currently admitted to the hospital after she developed confusion which does appear to be transient nature. She does not have any additional confusion noted on her examination today.  -Would recommend primary team continue to follow her neurological exam and if she does develop any focal neurological deficits she should obtain an MRI of the brain.  -If she becomes lethargic or more confused, can also consider obtaining an EEG. -Both of these studies at this time are not necessary as her neurological exam is normal.  She underwent head CT that showed no acute abnormalities. Bilateral carotid stenosis right greater than left. Carotid Dopplers were done urgently overnight which showed an occluded right carotid artery. This is likely chronic as patient does not have any symptoms currently. -Patient is on aspirin 81 mg daily as well as Coumadin for secondary stroke prevention.  -This can be followed as an outpatient by her PCP or neurology.     Thank you very much for this consultation, will sign off for now.   Please call if there are any additional questions or if there is a change in her neurological exam.    Magen Tyson MD

## 2021-01-09 NOTE — PROGRESS NOTES
Order received and chart reviewed. Pt just transferred from ED. Noted elevated troponin (steadily increasing), increased RR/oxygen needs (pt recently on non-rebreather), and low grade temp.  Will hold at this time until pt more medically stable and appropriate for PT eval.

## 2021-01-09 NOTE — PROGRESS NOTES
Received notification from bedside RN about patient with regards to:  Afib with RVR, rhythm persistent in 120's-130's despite IV Metorpolol     Intervention given: Cardizem 10 mg IV x 1    0448: Lactic acid 4.23, patient tachypneic  VS: /53, , RR 37, O2 sat 92% on NRM  -  ml IV bolus x 1, pro-BNP ordered

## 2021-01-09 NOTE — PROGRESS NOTES
0830 Bedside shift change report given to [address] (oncoming nurse) by Crystal Rankin (offgoing nurse). Report included the following information SBAR, Kardex, ED Summary, Intake/Output, MAR, Recent Results and Cardiac Rhythm A. Fib.

## 2021-01-09 NOTE — PROGRESS NOTES
Cardiology consult called but patient is patient of [facility]. Have asked nursing to notify Dr Tianna Wilson. Thank you for allowing me to participate in this patients care.     Kalyan Rojas MD, Alex Will

## 2021-01-09 NOTE — PROGRESS NOTES
Pharmacy Automatic Renal Dosing Protocol - Antimicrobials    Indication for Antimicrobials: Sepsis     Current Regimen of Each Antimicrobial:  Vancomycin 1750 mg IV once, then 1250 mg every 36 hours (Start Date ; Day # 2)  Zosyn 3.375 gm IV every 12 hours  (Start Date ; Day # 2)    Previous Antimicrobial Therapy:    Goal Level: VANCOMYCIN TROUGH GOAL RANGE    Vancomycin Trough: 15 - 20 mcg/mL  (AUC: 400 - 600 mg/hr/Liter/day)     Date Dose & Interval Measured (mcg/mL) Extrapolated (mcg/mL)                       Date & time of next level:     Significant Cultures:    blood: pending    Radiology / Imaging results: (X-ray, CT scan or MRI):     Paralysis, amputations, malnutrition: not noted    Labs:  Recent Labs     21  1900   CREA 1.67*   BUN 28*   WBC 14.1*     Temp (24hrs), Av °F (37.8 °C), Min:97.6 °F (36.4 °C), Max:103.2 °F (39.6 °C)      Is the Patient on Dialysis? No    Creatinine Clearance (mL/min):   CrCl (Actual Body Weight): 27.4  CrCl (Adjusted Body Weight): 22.7  CrCl (Ideal Body Weight): 19.6    Impression/Plan:   Continue zosyn 3.375 gm IV every 12 hours  Vancomycin 1750 mg loading dose, then 1250 mg IV q36hr (predicted trough 17.12 mcg/ml)  Antimicrobial stop date TBD     Pharmacy will follow daily and adjust medications as appropriate for renal function and/or serum levels.     Thank you,  Tabitha Ashley, PHARMD

## 2021-01-09 NOTE — PROGRESS NOTES
Hospitalist Progress Note    NAME: Maye Milton   :  1925   MRN:  [id number]       Assessment / Plan:  Sepsis severe  UTI  Gram negative bacteremia  Acute hypoxic respiratory failure. Interstitial lung disease  Metabolic encephalopathy  -WBC 22.0, Lactic acid 2.1 improved. -Blood culture positive for gram-negative bacteremia. -Repeat blood culture tomorrow-1/10/2021  -Urine culture pending. UA suggestive of UTI. -CT chest shows no acute findings.  -Chest x-ray shows enlarged cardiac silhouette as compared to yesterday chest x-ray. BNP 13 892. -Give Lasix 40 mg IV today. -Covid pending.  -Echo pending.   -Neurology consultedrecommended the same medical management. Holding onto the MRI as per neurology recommendations. Patient strength and mental status is back to baseline.  -On vancomycin and cefepime, de-escalate antibiotics to get all the cultures back. Elevated troponin- type 2 MI  Trop I5.76 the last 1. Likely type II MI secondary to sepsis and bacteremia. Cardiology consultedappreciate input.     Mild PRACHI on CKD stage III   BUN/creatinine31/1.72, may be the baseline. Will check again tomorrow.     A. Fib with RVR   Pharmacy controlling Coumadin dosing. INR 2.4. Cardiology on boardrecommended beta-blocker.     Right breast mass  She has is for years and declines any further work-up/treatment/surgical interventions         25.0 - 29.9 Overweight / Body mass index is 29.76 kg/m². Code status: DNR  Prophylaxis: Coumadin  Recommended Disposition:  PT, OT, RN     Subjective:     Chief Complaint / Reason for Physician Visit  Seen today, says that she is feeling good. Still on 1 L nasal cannula which is new for her. Discussed with RN events overnight.      Review of Systems:  Symptom Y/N Comments  Symptom Y/N Comments   Fever/Chills    Chest Pain     Poor Appetite    Edema     Cough    Abdominal Pain     Sputum    Joint Pain     SOB/ARREOLA    Pruritis/Rash     Nausea/vomit Tolerating PT/OT     Diarrhea    Tolerating Diet     Constipation    Other       Could NOT obtain due to:      Objective:     VITALS:   Last 24hrs VS reviewed since prior progress note. Most recent are:  Patient Vitals for the past 24 hrs:   Temp Pulse Resp BP SpO2   01/09/21 1420    (!) 142/65    01/09/21 1217 97.2 °F (36.2 °C) 91 24 (!) 142/65 92 %   01/09/21 0800  100  (!) 99/47 95 %   01/09/21 0654  (!) 108 30  96 %   01/09/21 0645  (!) 107 26 (!) 117/51 97 %   01/09/21 0600 99.7 °F (37.6 °C) (!) 101 30 (!) 99/51 98 %   01/09/21 0430  (!) 105 (!) 37 (!) 124/53 92 %   01/09/21 0348  (!) 120  (!) 166/84    01/09/21 0315 99 °F (37.2 °C) (!) 120 28 (!) 159/108 94 %   01/09/21 0258  (!) 130  (!) 134/110    01/09/21 0200  76  (!) 143/69 97 %   01/08/21 2345 98 °F (36.7 °C) 77 22 (!) 112/59 96 %   01/08/21 2200 97.6 °F (36.4 °C) 84 23 (!) 114/48 92 %   01/08/21 1900 99.1 °F (37.3 °C) (!) 115 24 (!) 151/67 94 %   01/08/21 1836 (!) 103.2 °F (39.6 °C) (!) 107 26 135/76 95 %     No intake or output data in the 24 hours ending 01/09/21 1541     I had a face to face encounter and independently examined this patient on 1/9/2021, as outlined below:  PHYSICAL EXAM:  General: WD, WN. Alert, cooperative, no acute distress    EENT:  EOMI. Anicteric sclerae. MMM  Resp:  CTA bilaterally, no wheezing or rales. No accessory muscle use  CV:  Regular  rhythm,  No edema  GI:  Soft, Non distended, Non tender. +Bowel sounds  Neurologic:  Alert and oriented X 3, normal speech,   Psych:   Good insight. Not anxious nor agitated  Skin:  No rashes.   No jaundice    Reviewed most current lab test results and cultures  YES  Reviewed most current radiology test results   YES  Review and summation of old records today    NO  Reviewed patient's current orders and MAR    YES  PMH/SH reviewed - no change compared to H&P  ________________________________________________________________________  Care Plan discussed with:    Comments Patient x    Family  x    RN     Care Manager     Consultant  x                      Multidiciplinary team rounds were held today with , nursing, pharmacist and clinical coordinator. Patient's plan of care was discussed; medications were reviewed and discharge planning was addressed. ________________________________________________________________________  Total NON critical care TIME:  38  Minutes    Total CRITICAL CARE TIME Spent:   Minutes non procedure based      Comments   >50% of visit spent in counseling and coordination of care     ________________________________________________________________________  Jair Sahu MD     Procedures: see electronic medical records for all procedures/Xrays and details which were not copied into this note but were reviewed prior to creation of Plan. LABS:  I reviewed today's most current labs and imaging studies.   Pertinent labs include:  Recent Labs     01/09/21  0419 01/08/21  2327 01/08/21  1900   WBC 22.0* 11.0 14.1*   HGB 12.7 11.7 14.3   HCT 41.7 36.3 44.2    165 203     Recent Labs     01/09/21  0419 01/08/21  2345 01/08/21  1900     --  137   K 3.8  --  3.5     --  102   CO2 26  --  27   *  --  163*   BUN 31*  --  28*   CREA 1.72*  --  1.67*   CA 8.3*  --  9.0   MG 2.3  --  2.6*   ALB  --   --  3.6   TBILI  --   --  1.7*   ALT  --   --  17   INR 2.4* 2.4* 2.4*       Signed: Jair Sahu MD

## 2021-01-10 LAB
ANION GAP SERPL CALC-SCNC: 9 MMOL/L (ref 5–15)
APTT PPP: 34.6 SEC (ref 22.1–31)
BASOPHILS # BLD: 0 K/UL (ref 0–0.1)
BASOPHILS NFR BLD: 0 % (ref 0–1)
BUN SERPL-MCNC: 41 MG/DL (ref 6–20)
BUN/CREAT SERPL: 24 (ref 12–20)
CALCIUM SERPL-MCNC: 8.8 MG/DL (ref 8.5–10.1)
CHLORIDE SERPL-SCNC: 102 MMOL/L (ref 97–108)
CHOLEST SERPL-MCNC: 107 MG/DL
CO2 SERPL-SCNC: 24 MMOL/L (ref 21–32)
CREAT SERPL-MCNC: 1.72 MG/DL (ref 0.55–1.02)
DIFFERENTIAL METHOD BLD: ABNORMAL
ECHO AO ROOT DIAM: 2.93 CM
ECHO AV AREA PEAK VELOCITY: 0.8 CM2
ECHO AV AREA/BSA PEAK VELOCITY: 0.4 CM2/M2
ECHO AV PEAK GRADIENT: 14.67 MMHG
ECHO AV PEAK VELOCITY: 191.5 CM/S
ECHO EST RA PRESSURE: 10 MMHG
ECHO LA MAJOR AXIS: 3.54 CM
ECHO LA MINOR AXIS: 1.79 CM
ECHO LV E' LATERAL VELOCITY: 7.73 CM/S
ECHO LV E' SEPTAL VELOCITY: 7.57 CM/S
ECHO LV INTERNAL DIMENSION DIASTOLIC: 4.78 CM (ref 3.9–5.3)
ECHO LV INTERNAL DIMENSION SYSTOLIC: 4.23 CM
ECHO LV IVSD: 0.69 CM (ref 0.6–0.9)
ECHO LV MASS 2D: 107.1 G (ref 67–162)
ECHO LV MASS INDEX 2D: 54.1 G/M2 (ref 43–95)
ECHO LV POSTERIOR WALL DIASTOLIC: 0.72 CM (ref 0.6–0.9)
ECHO LVOT DIAM: 1.93 CM
ECHO LVOT PEAK GRADIENT: 1.09 MMHG
ECHO LVOT PEAK VELOCITY: 52.26 CM/S
ECHO PV MAX VELOCITY: 53.44 CM/S
ECHO PV PEAK INSTANTANEOUS GRADIENT SYSTOLIC: 1.14 MMHG
ECHO RIGHT VENTRICULAR SYSTOLIC PRESSURE (RVSP): 22.19 MMHG
ECHO TV REGURGITANT MAX VELOCITY: 173.81 CM/S
ECHO TV REGURGITANT MAX VELOCITY: 188.77 CM/S
ECHO TV REGURGITANT PEAK GRADIENT: 12.19 MMHG
EOSINOPHIL # BLD: 0 K/UL (ref 0–0.4)
EOSINOPHIL NFR BLD: 0 % (ref 0–7)
ERYTHROCYTE [DISTWIDTH] IN BLOOD BY AUTOMATED COUNT: 15.9 % (ref 11.5–14.5)
GLUCOSE SERPL-MCNC: 151 MG/DL (ref 65–100)
HCT VFR BLD AUTO: 40.8 % (ref 35–47)
HDLC SERPL-MCNC: 26 MG/DL
HDLC SERPL: 4.1 {RATIO} (ref 0–5)
HEALTH STATUS, XMCV2T: NORMAL
HGB BLD-MCNC: 12.6 G/DL (ref 11.5–16)
IMM GRANULOCYTES # BLD AUTO: 0.1 K/UL (ref 0–0.04)
IMM GRANULOCYTES NFR BLD AUTO: 1 % (ref 0–0.5)
INR PPP: 3.2 (ref 0.9–1.1)
LDLC SERPL CALC-MCNC: 60.2 MG/DL (ref 0–100)
LIPID PROFILE,FLP: NORMAL
LYMPHOCYTES # BLD: 1.9 K/UL (ref 0.8–3.5)
LYMPHOCYTES NFR BLD: 13 % (ref 12–49)
MAGNESIUM SERPL-MCNC: 2.6 MG/DL (ref 1.6–2.4)
MCH RBC QN AUTO: 27.8 PG (ref 26–34)
MCHC RBC AUTO-ENTMCNC: 30.9 G/DL (ref 30–36.5)
MCV RBC AUTO: 90.1 FL (ref 80–99)
MONOCYTES # BLD: 0.5 K/UL (ref 0–1)
MONOCYTES NFR BLD: 3 % (ref 5–13)
NEUTS SEG # BLD: 12.5 K/UL (ref 1.8–8)
NEUTS SEG NFR BLD: 83 % (ref 32–75)
NRBC # BLD: 0 K/UL (ref 0–0.01)
NRBC BLD-RTO: 0 PER 100 WBC
PLATELET # BLD AUTO: 148 K/UL (ref 150–400)
PMV BLD AUTO: 11.2 FL (ref 8.9–12.9)
POTASSIUM SERPL-SCNC: 3.6 MMOL/L (ref 3.5–5.1)
PROTHROMBIN TIME: 31.4 SEC (ref 9–11.1)
RBC # BLD AUTO: 4.53 M/UL (ref 3.8–5.2)
SARS-COV-2, COV2: NOT DETECTED
SODIUM SERPL-SCNC: 135 MMOL/L (ref 136–145)
SOURCE, COVRS: NORMAL
SPECIMEN SOURCE, FCOV2M: NORMAL
SPECIMEN TYPE, XMCV1T: NORMAL
THERAPEUTIC RANGE,PTTT: ABNORMAL SECS (ref 58–77)
TRIGL SERPL-MCNC: 104 MG/DL (ref ?–150)
VLDLC SERPL CALC-MCNC: 20.8 MG/DL
WBC # BLD AUTO: 15.1 K/UL (ref 3.6–11)

## 2021-01-10 PROCEDURE — 83036 HEMOGLOBIN GLYCOSYLATED A1C: CPT

## 2021-01-10 PROCEDURE — 83735 ASSAY OF MAGNESIUM: CPT

## 2021-01-10 PROCEDURE — 80048 BASIC METABOLIC PNL TOTAL CA: CPT

## 2021-01-10 PROCEDURE — 74011000258 HC RX REV CODE- 258: Performed by: STUDENT IN AN ORGANIZED HEALTH CARE EDUCATION/TRAINING PROGRAM

## 2021-01-10 PROCEDURE — 74011250636 HC RX REV CODE- 250/636: Performed by: STUDENT IN AN ORGANIZED HEALTH CARE EDUCATION/TRAINING PROGRAM

## 2021-01-10 PROCEDURE — 87040 BLOOD CULTURE FOR BACTERIA: CPT

## 2021-01-10 PROCEDURE — 85730 THROMBOPLASTIN TIME PARTIAL: CPT

## 2021-01-10 PROCEDURE — 36415 COLL VENOUS BLD VENIPUNCTURE: CPT

## 2021-01-10 PROCEDURE — 77010033678 HC OXYGEN DAILY

## 2021-01-10 PROCEDURE — 85610 PROTHROMBIN TIME: CPT

## 2021-01-10 PROCEDURE — 65660000001 HC RM ICU INTERMED STEPDOWN

## 2021-01-10 PROCEDURE — 85025 COMPLETE CBC W/AUTO DIFF WBC: CPT

## 2021-01-10 PROCEDURE — 80061 LIPID PANEL: CPT

## 2021-01-10 PROCEDURE — 74011250637 HC RX REV CODE- 250/637: Performed by: GENERAL ACUTE CARE HOSPITAL

## 2021-01-10 RX ADMIN — ASPIRIN 81 MG: 81 TABLET, CHEWABLE ORAL at 10:25

## 2021-01-10 RX ADMIN — Medication 10 ML: at 06:56

## 2021-01-10 RX ADMIN — VANCOMYCIN HYDROCHLORIDE 1000 MG: 1 INJECTION, POWDER, LYOPHILIZED, FOR SOLUTION INTRAVENOUS at 10:31

## 2021-01-10 RX ADMIN — Medication 10 ML: at 16:09

## 2021-01-10 RX ADMIN — CEFEPIME HYDROCHLORIDE 2 G: 2 INJECTION, POWDER, FOR SOLUTION INTRAVENOUS at 10:31

## 2021-01-10 RX ADMIN — ATENOLOL 50 MG: 50 TABLET ORAL at 10:25

## 2021-01-10 NOTE — PROGRESS NOTES
Progress Note      1/10/2021 11:49 AM  NAME: Francis Borges   MRN:  [id number]   Admit Diagnosis: Elevated troponin [R77.8]; Lactic acid acidosis [E87.2]; Confusion [R41.0]     Assessment:     79 yo female admitted with acute infectious process, etiology yet to be defined. Fever 103. 2 on presentation to ER . CXR no infiltrates. Found to have UTI with Gram Negative bacteremia , sepsis .          Chronic Atrial Fibrillation ,  Noted in the records at least back 20 2015. RVR in setting of acute infectious process. Chronic anticoagulation with Warfarin , therapeutic level INR     Elevated Troponin , likely Type 2 NSTEMI secondary  to acute illness. Hypertension   Advanced age and Frailty . Acute / Chronic kidney failure. COVID rapid testing negative. COVID testing pending . Leukocytosis in setting of acute infection . Elevated Lacitic Acid . EKG Atrial Fibrillation . LBBB,   DNR .      Resident [facility] . Plan:     Hemodynamically  stable at this time       /70    P 60s. No cardiac changes. [x]        High complexity decision making was performed    Subjective:     Francis Borges denies chest pain, dyspnea. Discussed with RN events overnight.      Patient Active Problem List   Diagnosis Code    Hip fracture ([facility] S72.009A    A-fib (HCC) I48.91    PRACHI (acute kidney injury) ([facility] N17.9    Fever R50.9    Elevated troponin R77.8    Breast mass, right N63.10    Slurred speech R47.81    Confusion R41.0    Lactic acid acidosis E87.2       Review of Systems:    Symptom Y/N Comments  Symptom Y/N Comments   Fever/Chills N   Chest Pain N    Poor Appetite N   Edema N    Cough N   Abdominal Pain N    Sputum N   Joint Pain N    SOB/ARREOLA N   Pruritis/Rash N    Nausea/vomit N   Tolerating PT/OT Y    Diarrhea N   Tolerating Diet Y    Constipation N   Other       Could NOT obtain due to:      Objective:      Physical Exam:    Last 24hrs VS reviewed since prior progress note. Most recent are:    Visit Vitals  BP (!) 148/69   Pulse 62   Temp 97.4 °F (36.3 °C)   Resp 20   Ht 5' 7\" (1.702 m)   Wt 74 kg (163 lb 2.3 oz)   SpO2 94%   BMI 25.55 kg/m²     No intake or output data in the 24 hours ending 01/10/21 1149     General Appearance: Well developed, well nourished, alert & oriented x 3,    no acute distress. Ears/Nose/Mouth/Throat: Hearing grossly normal.  Neck: Supple. Chest: Lungs clear to auscultation bilaterally. Cardiovascular: Regular rate and rhythm, S1S2 normal, no murmur. Abdomen: Soft, non-tender, bowel sounds are active. Extremities: No edema bilaterally. Skin: Warm and dry. PMH/SH reviewed - no change compared to H&P    Data Review    Telemetry: normal sinus rhythm     Lab Data Personally Reviewed:    Recent Labs     01/10/21  0329 01/09/21  0419   WBC 15.1* 22.0*   HGB 12.6 12.7   HCT 40.8 41.7   * 188   LABRCNT(INR:3,PTP:3,APTT:3,)  Recent Labs     01/10/21  0329 01/09/21  0419 01/08/21  1900   * 138 137   K 3.6 3.8 3.5    103 102   CO2 24 26 27   BUN 41* 31* 28*   CREA 1.72* 1.72* 1.67*   * 180* 163*   CA 8.8 8.3* 9.0   MG 2.6* 2.3 2.6*   LABRCNT(CPK:3,CpKMB:3,ckndx:3,troiq:3)  Lab Results   Component Value Date/Time    Cholesterol, total 107 01/10/2021 03:29 AM    HDL Cholesterol 26 01/10/2021 03:29 AM    LDL, calculated 60.2 01/10/2021 03:29 AM    Triglyceride 104 01/10/2021 03:29 AM    CHOL/HDL Ratio 4.1 01/10/2021 03:29 AM   LABRCNT(sgot:3,gpt:3,ap:3,tbiL:3,TP:3,ALB:3,GLOB:3,ggt:3,aml:3,amyp:3,lpse:3,hlpse:3)No results for input(s): PH, PCO2, PO2 in the last 72 hours.   Lab Results   Component Value Date/Time    Cholesterol, total 107 01/10/2021 03:29 AM    HDL Cholesterol 26 01/10/2021 03:29 AM    LDL, calculated 60.2 01/10/2021 03:29 AM    Triglyceride 104 01/10/2021 03:29 AM    CHOL/HDL Ratio 4.1 01/10/2021 03:29 AM   MEDTABLETimrogers Guerrero MD  No results for input(s): PH, PCO2, PO2 in the last 72 hours.     Medications Personally Reviewed:    Current Facility-Administered Medications   Medication Dose Route Frequency    WARFARIN INFORMATION NOTE (COUMADIN)   Other ONCE    metoprolol (LOPRESSOR) injection 2.5 mg  2.5 mg IntraVENous Q6H PRN    WARFARIN INFORMATION NOTE (COUMADIN)   Other QPM    cefepime (MAXIPIME) 2 g in 0.9% sodium chloride (MBP/ADV) 100 mL MBP  2 g IntraVENous Q24H    vancomycin (VANCOCIN) 1,000 mg in 0.9% sodium chloride 250 mL (VIAL-MATE)  1,000 mg IntraVENous Q36H    atenoloL (TENORMIN) tablet 50 mg  50 mg Oral DAILY    sodium chloride (NS) flush 5-40 mL  5-40 mL IntraVENous Q8H    sodium chloride (NS) flush 5-40 mL  5-40 mL IntraVENous PRN    acetaminophen (TYLENOL) tablet 650 mg  650 mg Oral Q6H PRN    Or    acetaminophen (TYLENOL) suppository 650 mg  650 mg Rectal Q6H PRN    polyethylene glycol (MIRALAX) packet 17 g  17 g Oral DAILY PRN    promethazine (PHENERGAN) tablet 12.5 mg  12.5 mg Oral Q6H PRN    Or    ondansetron (ZOFRAN) injection 4 mg  4 mg IntraVENous Q6H PRN    aspirin chewable tablet 81 mg  81 mg Oral DAILY         Monica Bhandari MD

## 2021-01-10 NOTE — PROGRESS NOTES
End of Shift Note    Bedside shift change report given to [address] [place] [address] (oncoming nurse) by Daniel Bolaños (offgoing nurse). Report included the following information SBAR and Kardex    Shift worked:  [id number]     Shift summary and any significant changes:     Blood cultures taken. 1 BM during shift. Ambulated to bathroom. Concerns for physician to address:       Zone phone for oncoming shift:   [phone]       Activity:  Activity Level: Up with Assistance  Number times ambulated in hallways past shift: 0  Number of times OOB to chair past shift: 0    Cardiac:   Cardiac Monitoring: Yes      Cardiac Rhythm: Atrial fibrillation    Access:   Current line(s): PIV     Genitourinary:   Urinary status: voiding    Respiratory:   O2 Device: Nasal cannula  Chronic home O2 use?: NO  Incentive spirometer at bedside: NO     GI:     Current diet:  DIET CARDIAC Regular  Passing flatus: YES  Tolerating current diet: YES       Pain Management:   Patient states pain is manageable on current regimen: YES    Skin:  Neel Score: 17  Interventions: increase time out of bed    Patient Safety:  Fall Score:  Total Score: 3  Interventions: bed/chair alarm and pt to call before getting OOB  High Fall Risk: Yes    Length of Stay:  Expected LOS: - - -  Actual LOS: 2      Noel Ramirez

## 2021-01-10 NOTE — PROGRESS NOTES
Problem: Falls - Risk of  Goal: *Absence of Falls  Description: Document Green Salvia Fall Risk and appropriate interventions in the flowsheet. Outcome: Progressing Towards Goal  Note: Fall Risk Interventions:  Mobility Interventions: Bed/chair exit alarm, Communicate number of staff needed for ambulation/transfer, Patient to call before getting OOB, Utilize walker, cane, or other assistive device    Mentation Interventions: Adequate sleep, hydration, pain control, Bed/chair exit alarm, Increase mobility, More frequent rounding, Reorient patient, Room close to nurse's station, Update white board    Medication Interventions: Bed/chair exit alarm, Patient to call before getting OOB, Teach patient to arise slowly    Elimination Interventions: Bed/chair exit alarm, Call light in reach, Patient to call for help with toileting needs, Toileting schedule/hourly rounds              Problem: Pressure Injury - Risk of  Goal: *Prevention of pressure injury  Description: Document Neel Scale and appropriate interventions in the flowsheet.   Outcome: Progressing Towards Goal  Note: Pressure Injury Interventions:  Sensory Interventions: Assess changes in LOC, Assess need for specialty bed, Float heels, Keep linens dry and wrinkle-free, Maintain/enhance activity level, Minimize linen layers    Moisture Interventions: Absorbent underpads, Apply protective barrier, creams and emollients, Limit adult briefs, Minimize layers    Activity Interventions: Assess need for specialty bed, Increase time out of bed, Pressure redistribution bed/mattress(bed type)    Mobility Interventions: Assess need for specialty bed, Pressure redistribution bed/mattress (bed type)    Nutrition Interventions: Document food/fluid/supplement intake    Friction and Shear Interventions: Apply protective barrier, creams and emollients, Foam dressings/transparent film/skin sealants, Minimize layers

## 2021-01-10 NOTE — PROGRESS NOTES
Pharmacy Daily Dosing of Warfarin    Indication: a fib    Goal INR: 2-3    PTA Warfarin Dose: called [facility] ELENA RANDHAWA she is independent and manages her own medications (MD doctor dosing as of 1/4 is 2.5 mg every day, except on Monday and Friday. No dose on Monday and Friday)  Concurrent anticoagulants: none    Concurrent antiplatelet: aspirin    Major Interacting Medications   Drugs that may increase INR: no major  Drugs that may decrease INR: no major    Conditions that may increase/decrease INR (CHF, C. diff, cirrhosis, thyroid disorder, hypoalbuminemia):     Labs:  Recent Labs     01/10/21  0329 01/09/21  0419 01/08/21  2345 01/08/21  2327 01/08/21  1900   INR 3.2* 2.4* 2.4*  --  2.4*   HGB 12.6 12.7  --  11.7 14.3   * 188  --  165 203   TBILI  --   --   --   --  1.7*   ALB  --   --   --   --  3.6     Impression/Plan:   No dose given yesterday and INR increased to 3.2; will continue to hold warfarin  Daily INR  CBC w/o diff QOD     Pharmacy will follow daily and adjust the dose as appropriate.     Thanks  José Milton, PHARMD

## 2021-01-10 NOTE — PROGRESS NOTES
Physical Therapy    Chart reviewed and RN cleared to see but patient adamantly refused to participate. Patient educated on importance of being up in chair and assessed by therapy but she continually stated that she was fine and she didn't need us. Patient left supine in bed and RN notified of patient's refusal.  Will attempt to see for eval again tomorrow.     Adrián Cruz

## 2021-01-10 NOTE — PROGRESS NOTES
Occupational Therapy Note  Orders received, chart reviewed, RN OK pt for OT. Pt declining all therapy services this admission, stating \"I don't need any physical therapy\" (even though informed this was OT as well). Informed pt regarding the benefit of OT and OOB tasks, provided pt with chair in the room, and offered to assist to chair. Pt continued to decline, stating \"I need to eat lunch first\", even though pt had already had lunch. Pt continues to adamantly decline therapy, stating \" I just don't need it. I can move fine. \"  Will defer OT today, and f/u tomorrow as pt received another OT consult this day as well. If pt continues to decline OT tomorrow, will discontinue OT. Thank you.    Brenda Novoa, OTR/L

## 2021-01-10 NOTE — PROGRESS NOTES
End of Shift Note    Bedside shift change report given to [facility] RN (oncoming nurse) by Tima Morales (offgoing nurse). Report included the following information SBAR, Kardex, ED Summary, Intake/Output, MAR, Recent Results and Cardiac Rhythm ALara Cortes    Shift worked:  2858-0336     Shift summary and any significant changes:     Pt had lactic and troponin drawn. Lactic came back 2.1, MD notified. Neuro signed off on Pt. Was unable to collect sputum culture. Still waiting for results of PCR. Cem Billing Blood culture positive for gram negative. Concerns for physician to address:       Zone phone for oncoming shift:   [phone]       Activity:     Number times ambulated in hallways past shift: 0  Number of times OOB to chair past shift: 0    Cardiac:   Cardiac Monitoring: Yes      Cardiac Rhythm: Atrial fibrillation    Access:   Current line(s): PIV     Genitourinary:   Urinary status: voiding and incontinent    Respiratory:   O2 Device: Nasal cannula  Chronic home O2 use?: NO  Incentive spirometer at bedside: NO     GI:     Current diet:  DIET CARDIAC Regular  Passing flatus: YES  Tolerating current diet: YES       Pain Management:   Patient states pain is manageable on current regimen: YES    Skin:  Neel Score: 18  Interventions: float heels, increase time out of bed, PT/OT consult and internal/external urinary devices    Patient Safety:  Fall Score:  Total Score: 3  Interventions: bed/chair alarm, assistive device (walker, cane, etc), gripper socks and pt to call before getting OOB  High Fall Risk: Yes    Length of Stay:  Expected LOS: - - -  Actual LOS: [address]

## 2021-01-10 NOTE — PROGRESS NOTES
Problem: Falls - Risk of  Goal: *Absence of Falls  Description: Document Leafy Rogers Fall Risk and appropriate interventions in the flowsheet. Outcome: Progressing Towards Goal  Note: Fall Risk Interventions:  Mobility Interventions: Bed/chair exit alarm, Patient to call before getting OOB    Mentation Interventions: Bed/chair exit alarm, Increase mobility, More frequent rounding, Reorient patient    Medication Interventions: Bed/chair exit alarm, Patient to call before getting OOB, Teach patient to arise slowly    Elimination Interventions: Bed/chair exit alarm, Call light in reach, Patient to call for help with toileting needs              Problem: Pressure Injury - Risk of  Goal: *Prevention of pressure injury  Description: Document Neel Scale and appropriate interventions in the flowsheet.   Outcome: Progressing Towards Goal  Note: Pressure Injury Interventions:  Sensory Interventions: Discuss PT/OT consult with provider, Float heels, Keep linens dry and wrinkle-free, Minimize linen layers, Monitor skin under medical devices    Moisture Interventions: Absorbent underpads, Check for incontinence Q2 hours and as needed, Minimize layers    Activity Interventions: Increase time out of bed, PT/OT evaluation    Mobility Interventions: Float heels, HOB 30 degrees or less, PT/OT evaluation    Nutrition Interventions: Document food/fluid/supplement intake, Offer support with meals,snacks and hydration    Friction and Shear Interventions: HOB 30 degrees or less, Minimize layers

## 2021-01-10 NOTE — PROGRESS NOTES
Hospitalist Progress Note    NAME: Aury Russell   :  1925   MRN:  [id number]       Assessment / Plan:  Sepsis severe  UTI  Gram negative bacteremia  Acute hypoxic respiratory failure. Interstitial lung disease  Metabolic encephalopathy  -WBC 22.0, Lactic acid 2.1 on admission  -Leukocytosis improved to 15,000  -Blood culture positive for gram-negative bacteremia. Culture also growing gram-negative rods  -Repeat blood culture obtained  -Patient was started empirically on Vanco and cefepime  -We will discontinue vancomycin and continue cefepime. -CT chest shows no acute findings.  -Chest x-ray shows enlarged cardiac silhouette as compared to yesterday chest x-ray. BNP 13 892. -Was given IV Lasix yesterday with improvement in hypoxia  -COVID-19 is negative.  -Echo pending.   -Neurology consultedrecommended the same medical management. Holding onto the MRI as per neurology recommendations. Patient strength and mental status is back to baseline. -PT OT. Elevated troponin- type 2 MI  Trop I5.76 the last 1. Likely type II MI secondary to sepsis and bacteremia. Cardiology consultedappreciate input.     Mildly elevated creatinine, not PRACHI on CKD stage III   BUN/creatinine31/1.72, may be the baseline.    -Creatinine is stable     A. Fib with RVR   Pharmacy controlling Coumadin dosing. Cardiology on boardrecommended beta-blocker.     Right breast mass  She has is for years and declines any further work-up/treatment/surgical interventions         25.0 - 29.9 Overweight / Body mass index is 25.55 kg/m². Code status: DNR  Prophylaxis: Coumadin  Recommended Disposition:  PT, OT, RN     Subjective:     Chief Complaint / Reason for Physician Visit  Denies any active complaints. She reports she feels fine. Denies any fever or chills.   Denies any chest pain    Review of Systems:  Symptom Y/N Comments  Symptom Y/N Comments   Fever/Chills n   Chest Pain n    Poor Appetite n   Edema n    Cough n Abdominal Pain n    Sputum n   Joint Pain     SOB/ARREOLA n   Pruritis/Rash     Nausea/vomit n   Tolerating PT/OT     Diarrhea n   Tolerating Diet     Constipation n   Other       Could NOT obtain due to:      Objective:     VITALS:   Last 24hrs VS reviewed since prior progress note. Most recent are:  Patient Vitals for the past 24 hrs:   Temp Pulse Resp BP SpO2   01/10/21 1025 97.4 °F (36.3 °C) 62 20 (!) 148/69 94 %   01/10/21 0656 97.4 °F (36.3 °C) 64 22 (!) 148/66 96 %   01/10/21 0323 97.3 °F (36.3 °C) 68 20 (!) 148/61 96 %   01/09/21 2300 97.7 °F (36.5 °C) 70 22 136/68 90 %   01/09/21 1904 97.6 °F (36.4 °C) 86 24 (!) 124/59 97 %   01/09/21 1545 98.4 °F (36.9 °C) 83 22 (!) 102/59 94 %   01/09/21 1420    (!) 142/65      No intake or output data in the 24 hours ending 01/10/21 1232     I had a face to face encounter and independently examined this patient on 1/10/2021, as outlined below:  PHYSICAL EXAM:  General: WD, WN. Alert, cooperative, no acute distress    EENT:  EOMI. Anicteric sclerae. MMM  Resp:  CTA bilaterally, no wheezing or rales. No accessory muscle use  CV:  Regular  rhythm,  No edema  GI:  Soft, Non distended, Non tender. +Bowel sounds  Neurologic:  Alert and oriented X 3, normal speech,   Psych:   Good insight. Not anxious nor agitated  Skin:  No rashes. No jaundice    Reviewed most current lab test results and cultures  YES  Reviewed most current radiology test results   YES  Review and summation of old records today    NO  Reviewed patient's current orders and MAR    YES  PMH/SH reviewed - no change compared to H&P  ________________________________________________________________________  Care Plan discussed with:    Comments   Patient x    Family  x    RN     Care Manager     Consultant                        Multidiciplinary team rounds were held today with , nursing, pharmacist and clinical coordinator.   Patient's plan of care was discussed; medications were reviewed and discharge planning was addressed. ________________________________________________________________________  Total NON critical care TIME:  30 Minutes    Total CRITICAL CARE TIME Spent:   Minutes non procedure based      Comments   >50% of visit spent in counseling and coordination of care     ________________________________________________________________________  Alyson Massey MD     Procedures: see electronic medical records for all procedures/Xrays and details which were not copied into this note but were reviewed prior to creation of Plan. LABS:  I reviewed today's most current labs and imaging studies.   Pertinent labs include:  Recent Labs     01/10/21  0329 01/09/21  0419 01/08/21  2327   WBC 15.1* 22.0* 11.0   HGB 12.6 12.7 11.7   HCT 40.8 41.7 36.3   * 188 165     Recent Labs     01/10/21  0329 01/09/21  0419 01/08/21  2345 01/08/21  1900   * 138  --  137   K 3.6 3.8  --  3.5    103  --  102   CO2 24 26  --  27   * 180*  --  163*   BUN 41* 31*  --  28*   CREA 1.72* 1.72*  --  1.67*   CA 8.8 8.3*  --  9.0   MG 2.6* 2.3  --  2.6*   ALB  --   --   --  3.6   TBILI  --   --   --  1.7*   ALT  --   --   --  17   INR 3.2* 2.4* 2.4* 2.4*       Signed: Alyson Massey MD

## 2021-01-10 NOTE — PROGRESS NOTES
1900: End of Shift Note    Bedside shift change report given to Gloria Whitlock RN (oncoming nurse) by Monik Nickerson (offgoing nurse). Report included the following information SBAR    Shift worked:  9301-4200     Shift summary and any significant changes:     COVID test negative  Pt refused PT/OT     Concerns for physician to address:       Zone phone for oncoming shift:   [phone]       Activity:  Activity Level: Up with Assistance  Number times ambulated in hallways past shift: 0  Number of times OOB to chair past shift: 0    Cardiac:   Cardiac Monitoring: Yes      Cardiac Rhythm: Atrial fibrillation    Access:   Current line(s): PIV     Genitourinary:   Urinary status: voiding    Respiratory:   O2 Device: Room air  Chronic home O2 use?: NO  Incentive spirometer at bedside: NO     GI:  Last Bowel Movement Date: 01/09/21  Current diet:  DIET CARDIAC Regular  Passing flatus: YES  Tolerating current diet: YES       Pain Management:   Patient states pain is manageable on current regimen: YES    Skin:  Neel Score: 17  Interventions: PT/OT consult, internal/external urinary devices and nutritional support     Patient Safety:  Fall Score:  Total Score: 3  Interventions: bed/chair alarm, assistive device (walker, cane, etc), gripper socks and pt to call before getting OOB  High Fall Risk: Yes    Length of Stay:  Expected LOS: - - -  Actual LOS: [facility] Aug[facility] 23

## 2021-01-11 LAB
ANION GAP SERPL CALC-SCNC: 8 MMOL/L (ref 5–15)
APTT PPP: 34.7 SEC (ref 22.1–31)
ATRIAL RATE: 68 BPM
BACTERIA SPEC CULT: ABNORMAL
BUN SERPL-MCNC: 63 MG/DL (ref 6–20)
BUN/CREAT SERPL: 33 (ref 12–20)
CALCIUM SERPL-MCNC: 8.8 MG/DL (ref 8.5–10.1)
CALCULATED R AXIS, ECG10: -87 DEGREES
CALCULATED T AXIS, ECG11: 82 DEGREES
CC UR VC: ABNORMAL
CHLORIDE SERPL-SCNC: 100 MMOL/L (ref 97–108)
CO2 SERPL-SCNC: 24 MMOL/L (ref 21–32)
CREAT SERPL-MCNC: 1.91 MG/DL (ref 0.55–1.02)
DIAGNOSIS, 93000: NORMAL
EST. AVERAGE GLUCOSE BLD GHB EST-MCNC: 143 MG/DL
GLUCOSE SERPL-MCNC: 97 MG/DL (ref 65–100)
HBA1C MFR BLD: 6.6 % (ref 4–5.6)
INR PPP: 3.1 (ref 0.9–1.1)
POTASSIUM SERPL-SCNC: 3.8 MMOL/L (ref 3.5–5.1)
PROTHROMBIN TIME: 30.9 SEC (ref 9–11.1)
Q-T INTERVAL, ECG07: 354 MS
QRS DURATION, ECG06: 124 MS
QTC CALCULATION (BEZET), ECG08: 512 MS
SERVICE CMNT-IMP: ABNORMAL
SODIUM SERPL-SCNC: 132 MMOL/L (ref 136–145)
THERAPEUTIC RANGE,PTTT: ABNORMAL SECS (ref 58–77)
VENTRICULAR RATE, ECG03: 126 BPM

## 2021-01-11 PROCEDURE — 74011250637 HC RX REV CODE- 250/637: Performed by: GENERAL ACUTE CARE HOSPITAL

## 2021-01-11 PROCEDURE — 97161 PT EVAL LOW COMPLEX 20 MIN: CPT

## 2021-01-11 PROCEDURE — 85730 THROMBOPLASTIN TIME PARTIAL: CPT

## 2021-01-11 PROCEDURE — 85610 PROTHROMBIN TIME: CPT

## 2021-01-11 PROCEDURE — 97535 SELF CARE MNGMENT TRAINING: CPT

## 2021-01-11 PROCEDURE — 74011000258 HC RX REV CODE- 258: Performed by: INTERNAL MEDICINE

## 2021-01-11 PROCEDURE — 97116 GAIT TRAINING THERAPY: CPT

## 2021-01-11 PROCEDURE — 74011250636 HC RX REV CODE- 250/636: Performed by: INTERNAL MEDICINE

## 2021-01-11 PROCEDURE — 80048 BASIC METABOLIC PNL TOTAL CA: CPT

## 2021-01-11 PROCEDURE — 74011250637 HC RX REV CODE- 250/637: Performed by: INTERNAL MEDICINE

## 2021-01-11 PROCEDURE — 36415 COLL VENOUS BLD VENIPUNCTURE: CPT

## 2021-01-11 PROCEDURE — 74011250636 HC RX REV CODE- 250/636: Performed by: STUDENT IN AN ORGANIZED HEALTH CARE EDUCATION/TRAINING PROGRAM

## 2021-01-11 PROCEDURE — 77010033678 HC OXYGEN DAILY

## 2021-01-11 PROCEDURE — 97165 OT EVAL LOW COMPLEX 30 MIN: CPT

## 2021-01-11 PROCEDURE — 74011000258 HC RX REV CODE- 258: Performed by: STUDENT IN AN ORGANIZED HEALTH CARE EDUCATION/TRAINING PROGRAM

## 2021-01-11 PROCEDURE — 65660000001 HC RM ICU INTERMED STEPDOWN

## 2021-01-11 RX ORDER — ATORVASTATIN CALCIUM 40 MG/1
40 TABLET, FILM COATED ORAL
Status: DISCONTINUED | OUTPATIENT
Start: 2021-01-11 | End: 2021-01-12 | Stop reason: HOSPADM

## 2021-01-11 RX ORDER — WARFARIN 1 MG/1
1 TABLET ORAL ONCE
Status: COMPLETED | OUTPATIENT
Start: 2021-01-11 | End: 2021-01-11

## 2021-01-11 RX ORDER — CARVEDILOL 12.5 MG/1
12.5 TABLET ORAL 2 TIMES DAILY WITH MEALS
Status: DISCONTINUED | OUTPATIENT
Start: 2021-01-11 | End: 2021-01-12 | Stop reason: HOSPADM

## 2021-01-11 RX ADMIN — ACETAMINOPHEN 650 MG: 325 TABLET ORAL at 17:38

## 2021-01-11 RX ADMIN — WARFARIN SODIUM 1 MG: 1 TABLET ORAL at 17:38

## 2021-01-11 RX ADMIN — Medication 10 ML: at 22:06

## 2021-01-11 RX ADMIN — ASPIRIN 81 MG: 81 TABLET, CHEWABLE ORAL at 09:20

## 2021-01-11 RX ADMIN — Medication 10 ML: at 18:10

## 2021-01-11 RX ADMIN — ATENOLOL 50 MG: 50 TABLET ORAL at 09:20

## 2021-01-11 RX ADMIN — Medication 10 ML: at 04:07

## 2021-01-11 RX ADMIN — CARVEDILOL 12.5 MG: 12.5 TABLET, FILM COATED ORAL at 17:38

## 2021-01-11 RX ADMIN — CEFEPIME HYDROCHLORIDE 2 G: 2 INJECTION, POWDER, FOR SOLUTION INTRAVENOUS at 09:20

## 2021-01-11 RX ADMIN — CEFTRIAXONE SODIUM 2 G: 2 INJECTION, POWDER, FOR SOLUTION INTRAMUSCULAR; INTRAVENOUS at 12:48

## 2021-01-11 NOTE — PROGRESS NOTES
Hospitalist Progress Note    NAME: Mihir Sams   :  1925   MRN:  [id number]       Assessment / Plan:  Sepsis severe  UTI  Gram negative bacteremia  Acute hypoxic respiratory failure. Interstitial lung disease  Metabolic encephalopathy  -WBC 22.0, Lactic acid 2.1 on admission  -Leukocytosis improved to 15,000  -Blood culture positive E. coli. Urine culture also growing E. coli  -Repeat blood culture obtained and negative so far  -Patient was started empirically on Vanco and cefepime initially  -Currently on cefepime, will transition to ceftriaxone for convenience of dosing e. -CT chest shows no acute findings.  -Chest x-ray shows enlarged cardiac silhouette as compared to yesterday chest x-ray. BNP 13 892. -Was given IV Lasix yesterday with improvement in hypoxia  -COVID-19 is negative.  -Echo pending showed ejection fraction of 30 to 35%  -Neurology consultedrecommended the same medical management. Holding onto the MRI as per neurology recommendations. Patient strength and mental status is back to baseline. -PT OT. Elevated troponin- type 2 MI/systolic congestive heart failure  Troponin peaked to 5.7  Likely type II MI secondary to sepsis and bacteremia. Cardiology consultedappreciate input medical management recommended due to patient's age and likelihood of type II MI  Ejection fraction is 30 to 35%  Continue beta-blocker and transition to Coreg for low EF  Continue aspirin  And statin  . Anticoagulation with warfarin  No asymmetry or angiograms receptor blocker due to renal function  Stable Lasix on admission, currently euvolemic     Mildly elevated creatinine, not PRACHI on CKD stage III   BUN/creatinine31/1.72, may be the baseline.    -Creatinine is stable around 1.9     A. Fib with R   Pharmacy controlling Coumadin dosing.     Cardiology on board  Continue beta-blocker    Right breast mass  She has is for years and declines any further work-up/treatment/surgical interventions    25.0 - 29.9 Overweight / Body mass index is 25.37 kg/m². Code status: DNR  Prophylaxis: Coumadin  Recommended Disposition:  PT, OT, RN     Subjective:     Chief Complaint / Reason for Physician Visit  Denies any active complaints. She reports she feels fine. Denies any fever or chills. Denies any chest pain, participating in therapy    Review of Systems:  Symptom Y/N Comments  Symptom Y/N Comments   Fever/Chills n   Chest Pain n    Poor Appetite n   Edema n    Cough n   Abdominal Pain n    Sputum n   Joint Pain     SOB/ARREOLA n   Pruritis/Rash     Nausea/vomit n   Tolerating PT/OT y    Diarrhea n   Tolerating Diet     Constipation n   Other       Could NOT obtain due to:      Objective:     VITALS:   Last 24hrs VS reviewed since prior progress note. Most recent are:  Patient Vitals for the past 24 hrs:   Temp Pulse Resp BP SpO2   01/11/21 1123 97.4 °F (36.3 °C) 61 18 (!) 123/56 93 %   01/11/21 0622 98.2 °F (36.8 °C) (!) 57 18 126/60 95 %   01/11/21 0406 98 °F (36.7 °C) (!) 56 20 (!) 134/49 96 %   01/10/21 2216 97.5 °F (36.4 °C) 66 18 (!) 123/58 97 %   01/10/21 1834 97.6 °F (36.4 °C) (!) 59 18 96/60 96 %   01/10/21 1534    138/64    01/10/21 1442 97.8 °F (36.6 °C) 60 20 (!) 138/105 92 %     No intake or output data in the 24 hours ending 01/11/21 1305     I had a face to face encounter and independently examined this patient on 1/11/2021, as outlined below:  PHYSICAL EXAM:  General: WD, WN. Alert, cooperative, no acute distress    EENT:  EOMI. Anicteric sclerae. MMM  Resp:  CTA bilaterally, no wheezing or rales. No accessory muscle use  CV:  Regular  rhythm,  No edema  GI:  Soft, Non distended, Non tender. +Bowel sounds  Neurologic:  Alert and oriented X 3, normal speech,   Psych:   Good insight. Not anxious nor agitated  Skin:  No rashes.   No jaundice    Reviewed most current lab test results and cultures  YES  Reviewed most current radiology test results   YES  Review and summation of old records today    NO  Reviewed patient's current orders and MAR    YES  PMH/SH reviewed - no change compared to H&P  ________________________________________________________________________  Care Plan discussed with:    Comments   Patient x    Family  x    RN     Care Manager     Consultant                        Multidiciplinary team rounds were held today with , nursing, pharmacist and clinical coordinator. Patient's plan of care was discussed; medications were reviewed and discharge planning was addressed. ________________________________________________________________________  Total NON critical care TIME:  30 Minutes    Total CRITICAL CARE TIME Spent:   Minutes non procedure based      Comments   >50% of visit spent in counseling and coordination of care     ________________________________________________________________________  [place] MD Nell     Procedures: see electronic medical records for all procedures/Xrays and details which were not copied into this note but were reviewed prior to creation of Plan. LABS:  I reviewed today's most current labs and imaging studies. Pertinent labs include:  Recent Labs     01/10/21  0329 01/09/21  0419 01/08/21  2327   WBC 15.1* 22.0* 11.0   HGB 12.6 12.7 11.7   HCT 40.8 41.7 36.3   * 188 165     Recent Labs     01/11/21  0356 01/10/21  0329 01/09/21  0419 01/08/21  1900 01/08/21  1900   * 135* 138  --  137   K 3.8 3.6 3.8  --  3.5    102 103  --  102   CO2 24 24 26  --  27   GLU 97 151* 180*  --  163*   BUN 63* 41* 31*  --  28*   CREA 1.91* 1.72* 1.72*  --  1.67*   CA 8.8 8.8 8.3*  --  9.0   MG  --  2.6* 2.3  --  2.6*   ALB  --   --   --   --  3.6   TBILI  --   --   --   --  1.7*   ALT  --   --   --   --  17   INR 3.1* 3.2* 2.4*   < > 2.4*    < > = values in this interval not displayed.        Signed: Cristine Camejo MD

## 2021-01-11 NOTE — PROGRESS NOTES
End of Shift Note    Bedside shift change report given to GIRISH Johnson (oncoming nurse) by Shelia Galvez RN (offgoing nurse). Report included the following information SBAR    Shift worked:  7pm-7sm     Shift summary and any significant changes:     Uneventful night. Concerns for physician to address:       Zone phone for oncoming shift:          Activity:  Activity Level: Up with Assistance  Number times ambulated in hallways past shift: 0  Number of times OOB to chair past shift: 0    Cardiac:   Cardiac Monitoring: Yes      Cardiac Rhythm: Atrial fibrillation    Access:   Current line(s): PIV     Genitourinary:   Urinary status: voiding    Respiratory:   O2 Device: Room air  Chronic home O2 use?: NO  Incentive spirometer at bedside: NO     GI:  Last Bowel Movement Date: 01/09/21  Current diet:  DIET CARDIAC Regular  Passing flatus: YES  Tolerating current diet: YES       Pain Management:   Patient states pain is manageable on current regimen: YES    Skin:  Neel Score: 17  Interventions: float heels    Patient Safety:  Fall Score:  Total Score: 3  Interventions: bed/chair alarm  High Fall Risk: Yes    Length of Stay:  Expected LOS: - - -  Actual LOS: 3      Shelia Galvez RN

## 2021-01-11 NOTE — PROGRESS NOTES
Progress Note      1/11/2021 11:49 AM  NAME: Hodan Bonds   MRN:  [id number]   Admit Diagnosis: Elevated troponin [R77.8]; Lactic acid acidosis [E87.2]; Confusion [R41.0]     Assessment:     79 yo female admitted with acute infectious process, etiology yet to be defined. Fever 103. 2 on presentation to ER . CXR no infiltrates. Found to have UTI with Gram Negative bacteremia , sepsis .          Chronic Atrial Fibrillation ,  Noted in the records at least back 20 2015. RVR in setting of acute infectious process. Chronic anticoagulation with Warfarin , therapeutic level INR     Elevated Troponin , likely Type 2 NSTEMI secondary  to acute illness. Hypertension   Advanced age and Frailty . Acute / Chronic kidney failure. COVID rapid testing negative. COVID testing pending . Leukocytosis in setting of acute infection . Elevated Lacitic Acid . EKG Atrial Fibrillation . LBBB,   DNR .      Resident [address] . Plan:     Hemodynamically  stable at this time       /70    P 60s. No cardiac changes. 1/11  Cardiac stable . Will see on request.       [x]        High complexity decision making was performed    Subjective:     Hodan Bonds denies chest pain, dyspnea. Discussed with RN events overnight.      Patient Active Problem List   Diagnosis Code    Hip fracture ([facility] S72.009A    A-fib (HCC) I48.91    [facility] N17.9    Fever R50.9    Elevated troponin R77.8    Breast mass, right N63.10    Slurred speech R47.81    Confusion R41.0    Lactic acid acidosis E87.2       Review of Systems:    Symptom Y/N Comments  Symptom Y/N Comments   Fever/Chills N   Chest Pain N    Poor Appetite N   Edema N    Cough N   Abdominal Pain N    Sputum N   Joint Pain N    SOB/ARREOLA N   Pruritis/Rash N    Nausea/vomit N   Tolerating PT/OT Y    Diarrhea N   Tolerating Diet Y    Constipation N   Other       Could NOT obtain due to: Objective:      Physical Exam:    Last 24hrs VS reviewed since prior progress note. Most recent are:    Visit Vitals  BP (!) 123/56 (BP 1 Location: Left arm, BP Patient Position: At rest)   Pulse 61   Temp 97.4 °F (36.3 °C)   Resp 18   Ht 5' 7\" (1.702 m)   Wt 73.5 kg (162 lb)   SpO2 93%   BMI 25.37 kg/m²     No intake or output data in the 24 hours ending 01/11/21 1219     General Appearance: Well developed, well nourished, alert & oriented x 3,    no acute distress. Ears/Nose/Mouth/Throat: Hearing grossly normal.  Neck: Supple. Chest: Lungs clear to auscultation bilaterally. Cardiovascular: Regular rate and rhythm, S1S2 normal, no murmur. Abdomen: Soft, non-tender, bowel sounds are active. Extremities: No edema bilaterally. Skin: Warm and dry. PMH/SH reviewed - no change compared to H&P    Data Review    Telemetry: normal sinus rhythm     Lab Data Personally Reviewed:    Recent Labs     01/10/21  0329 01/09/21  0419   WBC 15.1* 22.0*   HGB 12.6 12.7   HCT 40.8 41.7   * 188   LABRCNT(INR:3,PTP:3,APTT:3,)  Recent Labs     01/11/21  0356 01/10/21  0329 01/09/21  0419 01/08/21  1900   * 135* 138 137   K 3.8 3.6 3.8 3.5    102 103 102   CO2 24 24 26 27   BUN 63* 41* 31* 28*   CREA 1.91* 1.72* 1.72* 1.67*   GLU 97 151* 180* 163*   CA 8.8 8.8 8.3* 9.0   MG  --  2.6* 2.3 2.6*   LABRCNT(CPK:3,CpKMB:3,ckndx:3,troiq:3)  Lab Results   Component Value Date/Time    Cholesterol, total 107 01/10/2021 03:29 AM    HDL Cholesterol 26 01/10/2021 03:29 AM    LDL, calculated 60.2 01/10/2021 03:29 AM    Triglyceride 104 01/10/2021 03:29 AM    CHOL/HDL Ratio 4.1 01/10/2021 03:29 AM   LABRCNT(sgot:3,gpt:3,ap:3,tbiL:3,TP:3,ALB:3,GLOB:3,ggt:3,aml:3,amyp:3,lpse:3,hlpse:3)No results for input(s): PH, PCO2, PO2 in the last 72 hours.   Lab Results   Component Value Date/Time    Cholesterol, total 107 01/10/2021 03:29 AM    HDL Cholesterol 26 01/10/2021 03:29 AM    LDL, calculated 60.2 01/10/2021 03:29 AM Triglyceride 104 01/10/2021 03:29 AM    CHOL/HDL Ratio 4.1 01/10/2021 03:29 AM   MEDTABLEDavid Brambila MD  No results for input(s): PH, PCO2, PO2 in the last 72 hours.    Medications Personally Reviewed:    Current Facility-Administered Medications   Medication Dose Route Frequency   • cefTRIAXone (ROCEPHIN) 2 g in 0.9% sodium chloride (MBP/ADV) 50 mL MBP  2 g IntraVENous Q24H   • metoprolol (LOPRESSOR) injection 2.5 mg  2.5 mg IntraVENous Q6H PRN   • WARFARIN INFORMATION NOTE (COUMADIN)   Other QPM   • atenoloL (TENORMIN) tablet 50 mg  50 mg Oral DAILY   • sodium chloride (NS) flush 5-40 mL  5-40 mL IntraVENous Q8H   • sodium chloride (NS) flush 5-40 mL  5-40 mL IntraVENous PRN   • acetaminophen (TYLENOL) tablet 650 mg  650 mg Oral Q6H PRN    Or   • acetaminophen (TYLENOL) suppository 650 mg  650 mg Rectal Q6H PRN   • polyethylene glycol (MIRALAX) packet 17 g  17 g Oral DAILY PRN   • promethazine (PHENERGAN) tablet 12.5 mg  12.5 mg Oral Q6H PRN    Or   • ondansetron (ZOFRAN) injection 4 mg  4 mg IntraVENous Q6H PRN   • aspirin chewable tablet 81 mg  81 mg Oral DAILY         David Brambila MD

## 2021-01-11 NOTE — PROGRESS NOTES
DAVIDA: [facility]    3:45pm- CM called Genevieve Cone with [facility] ([phone]) via phone. CM inquired with Cherise if pt is able to go the healthcare side for the IV antibiotics. Cherise requested that CM faxed clinical documentation to [phone].     3:42pm-Rafy with [facility] called CM via phone. CM inquired with Shauna Corrales if they are able to handle IV antibiotics. Shauna Corrales stated that they are not able to handle the IV antibiotics. 3:40pm- Genevieve Cone with [facility] ([phone]) called CM via phone. CM inquired with Cherise about [facility] for the IV antibiotic. Akbar Davis stated that she will get Shauna Corrales with [facility] to call CM.     3:25pm- CM called Genevieve Cone with [facility] ([phone]) via phone to inquired about home health. CM left a voicemail message. 11:10am- Genevieve Cone with [facility] ([phone]) called CM via phone. Cherise inquired with CM as to whether she need to go the healthcare side when she returns. CM informed Cherise that PT/OT recommended no needs. CM will continue to follow patient for discharge planning needs and arrange for services as deemed necessary.     Abhishek Foster [address] [place] [address]  [phone]

## 2021-01-11 NOTE — PROGRESS NOTES
0700: Bedside shift change report given to Leobardo RN (oncoming nurse) by Rodríguez Vázquez RN (offgoing nurse). Report included the following information SBAR and Kardex.

## 2021-01-11 NOTE — PROGRESS NOTES
Problem: Falls - Risk of  Goal: *Absence of Falls  Description: Document Catherine Zuniga Fall Risk and appropriate interventions in the flowsheet.   Outcome: Progressing Towards Goal  Note: Fall Risk Interventions:  Mobility Interventions: Bed/chair exit alarm, Communicate number of staff needed for ambulation/transfer, Patient to call before getting OOB, Utilize walker, cane, or other assistive device    Mentation Interventions: Adequate sleep, hydration, pain control, Bed/chair exit alarm, Door open when patient unattended, Increase mobility, More frequent rounding, Reorient patient, Room close to nurse's station, Update white board    Medication Interventions: Bed/chair exit alarm, Patient to call before getting OOB, Teach patient to arise slowly    Elimination Interventions: Bed/chair exit alarm, Call light in reach, Patient to call for help with toileting needs, Toileting schedule/hourly rounds

## 2021-01-11 NOTE — PROGRESS NOTES
OCCUPATIONAL THERAPY EVALUATION/DISCHARGE  Patient: Deanne Root (31 y.o. female)  Date: 1/11/2021  Primary Diagnosis: Elevated troponin [R77.8]  Lactic acid acidosis [E87.2]  Confusion [R41.0]       Precautions:   Fall    ASSESSMENT  Based on the objective data described below, the patient presents with decreased higher level activity tolerance; however, she is noted to be completing self-care routine with Modified [place] with use of RW, benefiting from extended time for distal LE ADL management. Pt reports all DME/AE needs fulfilled within home environment, with PRN assist available from staffing at [facility] staffing, with pt declining any acute OT needs. Pt educated on benefits of sitting up for all meals, active ADL engagement, as well as, using RW to/from bathroom; good understanding verbalized. Given pt's current high level of self-care independence, DME/AE needs fulfilled and PRN assist available from [facility] staffing, no other acute OT needs identified, with OT answering pt's questions/concerns regarding discharge from acute OT services and pt thanking therapist for his efforts. Current Level of Function (ADLs/self-care): Modified [place]    Functional Outcome Measure: The patient scored 80/100 on the Barthel Index outcome measure. Other factors to consider for discharge: lives alone     PLAN :  Recommend with staff: OOB meals, RW to/from bathroom, Active ADL engagement    Recommendation for discharge: (in order for the patient to meet his/her long term goals)  No skilled occupational therapy/ follow up rehabilitation needs identified at this time. This discharge recommendation:  Has not yet been discussed the attending provider and/or case management    IF patient discharges home will need the following DME: patient owns DME required for discharge       SUBJECTIVE:   Patient stated I don't need any therapy, I won't do any.     OBJECTIVE DATA SUMMARY:   HISTORY: Past Medical History:   Diagnosis Date    Arthritis     Atrial fibrillation ([facility] 12/2015    post hip surgery    Chronic obstructive pulmonary disease (HCC)     Gastrointestinal disorder     gallstones    Hypertension     Iron deficiency     Leg ulcer ([facility]     Macular degeneration     OA (osteoarthritis) of shoulder     Spinal stenosis     lumbar     Past Surgical History:   Procedure Laterality Date    HX HEENT      cataract surgery with lens implants    HX ORTHOPAEDIC      right hip surgery       Prior Level of Function/Environment/Context: Independent using Rollator verses cruising furniture. PRN IADL assist from children. Expanded or extensive additional review of patient history:   Home Situation  Home Environment: Independent living  # Steps to Enter: 0  One/Two Story Residence: One story  Living Alone: Yes  Support Systems: Child(cliff)  Patient Expects to be Discharged to[de-identified] Independent living facility  Current DME Used/Available at Home: Krista Ply, rollator, Grab bars, Shower chair, Adaptive bathing aides, Adaptive dressing aides(Power scooter)  Tub or Shower Type: Shower    Hand dominance: Right    EXAMINATION OF PERFORMANCE DEFICITS:  Cognitive/Behavioral Status:  Neurologic State: Alert  Orientation Level: Oriented X4  Cognition: Follows commands  Perception: Appears intact  Perseveration: No perseveration noted  Safety/Judgement: Awareness of environment     Hearing: Auditory  Auditory Impairment: None    Vision/Perceptual:    Corrective Lenses: Glasses    Range of Motion:  AROM: Within functional limits  PROM: Within functional limits    Strength:  Strength: Within functional limits    Coordination:  Coordination: Within functional limits  Fine Motor Skills-Upper: Right Intact; Left Intact    Gross Motor Skills-Upper: Left Intact; Right Intact    Tone & Sensation:  Tone: Normal  Sensation: Intact    Balance:  Sitting: Intact  Standing: Intact; With support    Functional Mobility and Transfers for ADLs:  Bed Mobility:  Rolling: Modified independent  Supine to Sit: Modified independent  Sit to Supine: Modified independent  Scooting: Modified independent    Transfers:  Sit to Stand: Modified independent  Stand to Sit: Modified independent  Bed to Chair: Modified independent  Bathroom Mobility: Modified independent  Toilet Transfer : Modified independent    ADL Assessment:  Feeding: Independent    Oral Facial Hygiene/Grooming: Modified Independent    Bathing: Modified independent    Upper Body Dressing: Modified independent    Lower Body Dressing: Modified independent    Toileting: Modified independent    ADL Intervention and task modifications:  Patient instructed and indicated understanding the benefits of maintaining activity tolerance, functional mobility, and independence with self care tasks during acute stay  to ensure safe return home and to baseline. Encouraged patient to increase frequency and duration OOB, be out of bed for all meals, perform daily ADLs (as approved by RN/MD regarding bathing etc), and performing functional mobility to/from bathroom. Pt educated on safe transfer techniques, with specific emphasis on proper hand placement to push up from seated surface rather than attempt to pull self up, fully positioning self in-front of desired seated location, feeling chair on back of legs and reaching back with 1-2 UE to slowly lower self to seated position. Cognitive Retraining  Safety/Judgement: Awareness of environment     Functional Measure:  Barthel Index:    Bathin  Bladder: 10  Bowels: 10  Groomin  Dressing: 10  Feeding: 10  Mobility: 0  Stairs: 5  Toilet Use: 10  Transfer (Bed to Chair and Back): 15  Total: 80/100        The Barthel ADL Index: Guidelines  1. The index should be used as a record of what a patient does, not as a record of what a patient could do.   2. The main aim is to establish degree of independence from any help, physical or verbal, however minor and for whatever reason. 3. The need for supervision renders the patient not independent. 4. A patient's performance should be established using the best available evidence. Asking the patient, friends/relatives and nurses are the usual sources, but direct observation and common sense are also important. However direct testing is not needed. 5. Usually the patient's performance over the preceding 24-48 hours is important, but occasionally longer periods will be relevant. 6. Middle categories imply that the patient supplies over 50 per cent of the effort. 7. Use of aids to be independent is allowed. Jaja Mariscal., Barthel, D.W. ([id number]). Functional evaluation: the Barthel Index. [address] [facility] (14)2. Kelly Monsivais darrell LILIAN Valladares, Mami Hirsch., Severa Mouse., Amy, [address] (1999). Measuring the change indisability after inpatient rehabilitation; comparison of the responsiveness of the Barthel Index and Functional [place] Measure. Journal of Neurology, Neurosurgery, and Psychiatry, 66(4), 686-337. Bell Marcano, N.J.A, PRISCILLA Antunez, & Samira Belcher MASHLEY. (2004.) Assessment of post-stroke quality of life in cost-effectiveness studies: The usefulness of the Barthel Index and the EuroQoL-5D.  Quality of Life Research, 15, 296-83     Occupational Therapy Evaluation Charge Determination   History Examination Decision-Making   LOW Complexity : Brief history review  LOW Complexity : 1-3 performance deficits relating to physical, cognitive , or psychosocial skils that result in activity limitations and / or participation restrictions  LOW Complexity : No comorbidities that affect functional and no verbal or physical assistance needed to complete eval tasks       Based on the above components, the patient evaluation is determined to be of the following complexity level: LOW   Pain Rating:  No c/o pain    Activity Tolerance:   Good, Fair and requires rest breaks    After treatment patient left in no apparent distress: Supine in bed, Call bell within reach and Side rails x 3    COMMUNICATION/EDUCATION:   The patients plan of care was discussed with: Physical therapist and Registered nurse.      Thank you for this referral.  Josefina Pratt OT  Time Calculation: 21 mins

## 2021-01-11 NOTE — PROGRESS NOTES
PHYSICAL THERAPY EVALUATION/DISCHARGE  Patient: Leora Lennox (43 y.o. female)  Date: 1/11/2021  Primary Diagnosis: Elevated troponin [R77.8]  Lactic acid acidosis [E87.2]  Confusion [R41.0]       Precautions:   Fall      ASSESSMENT  Based on the objective data described below, the patient presents with generalized weakness and slightly decreased activity tolerance. Pt received supine in bed, agreeable to get up just to show the PT how she is moving around. Pt stating she doesn't need PT, but then stating that she will be going to the healthcare side of [facility]. Overall pt demonstrates mobility at mod I to S. Pt prefers to use the RW, but reports at home she mostly furniture walks in her apartment and uses her rollator or power chair for distance ambulation. Pt reporting she is moving like she normally does at home and declining further PT intervention. Pt education provided regarding benefits of being OOB for meals, staying active, using the RW to/from bathroom, with verbalizing understanding. No further acute PT needs that warrant further PT intervention given pt's current LOF and PRN assist available from the [address] [place] [address] staff. Will sign off. Current Level of Function (Mobility): Modified [place]     Functional Outcome Measure: The patient scored 80/100 on the Barthel Index outcome measure.       Other factors to consider for discharge: has staff support at [address] [place] [address] skilled acute physical therapy is not indicated at this time. PLAN :  Recommendation for discharge: (in order for the patient to meet his/her long term goals)  No skilled physical therapy/ follow up rehabilitation needs identified at this time.     This discharge recommendation:  Has been made in collaboration with the attending provider and/or case management    IF patient discharges home will need the following DME: patient owns DME required for discharge       SUBJECTIVE:   Patient stated I don't need Physical Therapy. I have done that in the past.    OBJECTIVE DATA SUMMARY:   HISTORY:    Past Medical History:   Diagnosis Date    Arthritis     Atrial fibrillation ([facility] 12/2015    post hip surgery    Chronic obstructive pulmonary disease (HCC)     Gastrointestinal disorder     gallstones    Hypertension     Iron deficiency     Leg ulcer ([facility]     Macular degeneration     OA (osteoarthritis) of shoulder     Spinal stenosis     lumbar     Past Surgical History:   Procedure Laterality Date    HX HEENT      cataract surgery with lens implants    HX ORTHOPAEDIC      right hip surgery       Prior level of function: Mod I    Personal factors and/or comorbidities impacting plan of care: none    Home Situation  Home Environment: Independent living  # Steps to Enter: 0  One/Two Story Residence: One story  Living Alone: Yes  Support Systems: Child(cliff)  Patient Expects to be Discharged to[de-identified] Independent living facility  Current DME Used/Available at Home: Zuleima Oren, rollator, Grab bars, Shower chair, Adaptive bathing aides, Adaptive dressing aides(Power scooter)  Tub or Shower Type: Shower    EXAMINATION/PRESENTATION/DECISION MAKING:   Critical Behavior:  Neurologic State: Alert  Orientation Level: Oriented X4  Cognition: Follows commands  Safety/Judgement: Awareness of environment  Hearing: Auditory  Auditory Impairment: None  Range Of Motion:  AROM: Within functional limits           PROM: Within functional limits           Strength:    Strength:  Within functional limits                    Tone & Sensation:   Tone: Normal              Sensation: Intact               Coordination:  Coordination: Within functional limits  Vision:   Corrective Lenses: Glasses  Functional Mobility:  Bed Mobility:  Rolling: Modified independent  Supine to Sit: Modified independent  Sit to Supine: Modified independent  Scooting: Modified independent  Transfers:  Sit to Stand: Modified independent  Stand to Sit: Modified independent        Bed to Chair: Modified independent              Balance:   Sitting: Intact  Standing: Intact; With support  Ambulation/Gait Training:  Distance (ft): 30 Feet (ft)  Assistive Device: Walker, rolling  Ambulation - Level of Assistance: Modified independent     Gait Description (WDL): Exceptions to WDL  Gait Abnormalities: Decreased step clearance        Base of Support: Widened     Speed/Yamilex: Pace decreased (<100 feet/min)  Step Length: Left shortened;Right shortened             Functional Measure:  Barthel Index:    Bathin  Bladder: 10  Bowels: 10  Groomin  Dressing: 10  Feeding: 10  Mobility: 0  Stairs: 5  Toilet Use: 10  Transfer (Bed to Chair and Back): 15  Total: 80/100       The Barthel ADL Index: Guidelines  1. The index should be used as a record of what a patient does, not as a record of what a patient could do. 2. The main aim is to establish degree of independence from any help, physical or verbal, however minor and for whatever reason. 3. The need for supervision renders the patient not independent. 4. A patient's performance should be established using the best available evidence. Asking the patient, friends/relatives and nurses are the usual sources, but direct observation and common sense are also important. However direct testing is not needed. 5. Usually the patient's performance over the preceding 24-48 hours is important, but occasionally longer periods will be relevant. 6. Middle categories imply that the patient supplies over 50 per cent of the effort. 7. Use of aids to be independent is allowed. Raphael Montoya., Barthel, DLaraW. ([id number]). Functional evaluation: the Barthel Index. [facility] [address] [facility] (14)2. LILIAN Constantino, Terrell Basilio.Sukhwinder Florina Eddy, [address] [place] [address] (). Measuring the change indisability after inpatient rehabilitation; comparison of the responsiveness of the Barthel Index and Functional [place] Measure.  Journal of Neurology, Neurosurgery, and Psychiatry, 66(9), 686-813. SHERYL Levi, PRISCILLA Antunez, & Pam Park M.A. (2004.) Assessment of post-stroke quality of life in cost-effectiveness studies: The usefulness of the Barthel Index and the EuroQoL-5D. Quality of Life Research, 15, 281-91          Physical Therapy Evaluation Charge Determination   History Examination Presentation Decision-Making   LOW Complexity : Zero comorbidities / personal factors that will impact the outcome / POC LOW Complexity : 1-2 Standardized tests and measures addressing body structure, function, activity limitation and / or participation in recreation  LOW Complexity : Stable, uncomplicated  LOW Complexity : FOTO score of       Based on the above components, the patient evaluation is determined to be of the following complexity level: LOW     Pain Rating:  None reported    Activity Tolerance:   Good      After treatment patient left in no apparent distress:   Supine in bed    COMMUNICATION/EDUCATION:   The patients plan of care was discussed with: Occupational therapist, Registered nurse and Case management. Fall prevention education was provided and the patient/caregiver indicated understanding.     Thank you for this referral.  Larry Khan, PT   Time Calculation: 20 mins

## 2021-01-11 NOTE — PROGRESS NOTES
Speech path  Patient was referred for speech evaluation per protocol. She had dysarthria which is why she was admitted for work up. Her dysartrhia fully resolved. We will sign off.    Carly Quinonez, SLP

## 2021-01-12 VITALS
TEMPERATURE: 97.3 F | RESPIRATION RATE: 15 BRPM | OXYGEN SATURATION: 94 % | BODY MASS INDEX: 25.74 KG/M2 | DIASTOLIC BLOOD PRESSURE: 46 MMHG | HEIGHT: 67 IN | WEIGHT: 164 LBS | HEART RATE: 57 BPM | SYSTOLIC BLOOD PRESSURE: 108 MMHG

## 2021-01-12 PROBLEM — I65.23 BILATERAL CAROTID ARTERY STENOSIS: Status: ACTIVE | Noted: 2021-01-12

## 2021-01-12 PROBLEM — I67.89 CEREBRAL MICROVASCULAR DISEASE: Status: ACTIVE | Noted: 2021-01-12

## 2021-01-12 LAB
ANION GAP SERPL CALC-SCNC: 9 MMOL/L (ref 5–15)
APTT PPP: 33 SEC (ref 22.1–31)
BUN SERPL-MCNC: 69 MG/DL (ref 6–20)
BUN/CREAT SERPL: 39 (ref 12–20)
CALCIUM SERPL-MCNC: 8.2 MG/DL (ref 8.5–10.1)
CHLORIDE SERPL-SCNC: 103 MMOL/L (ref 97–108)
CO2 SERPL-SCNC: 24 MMOL/L (ref 21–32)
CREAT SERPL-MCNC: 1.77 MG/DL (ref 0.55–1.02)
ERYTHROCYTE [DISTWIDTH] IN BLOOD BY AUTOMATED COUNT: 15.2 % (ref 11.5–14.5)
GLUCOSE SERPL-MCNC: 105 MG/DL (ref 65–100)
HCT VFR BLD AUTO: 38.3 % (ref 35–47)
HGB BLD-MCNC: 12 G/DL (ref 11.5–16)
INR PPP: 2.2 (ref 0.9–1.1)
LEFT CCA DIST DIAS: 7.9 CM/S
LEFT CCA DIST SYS: 67.7 CM/S
LEFT CCA PROX DIAS: 7.7 CM/S
LEFT CCA PROX SYS: 98.3 CM/S
LEFT ECA DIAS: 27.64 CM/S
LEFT ECA SYS: 129.8 CM/S
LEFT ICA PROX DIAS: 14.4 CM/S
LEFT ICA PROX SYS: 80.6 CM/S
LEFT ICA/CCA SYS: 1.19
LEFT SUBCLAVIAN DIAS: 0 CM/S
LEFT SUBCLAVIAN SYS: 50.3 CM/S
LEFT VERTEBRAL DIAS: 22.99 CM/S
LEFT VERTEBRAL SYS: 76.4 CM/S
MCH RBC QN AUTO: 27.6 PG (ref 26–34)
MCHC RBC AUTO-ENTMCNC: 31.3 G/DL (ref 30–36.5)
MCV RBC AUTO: 88 FL (ref 80–99)
NRBC # BLD: 0 K/UL (ref 0–0.01)
NRBC BLD-RTO: 0 PER 100 WBC
PLATELET # BLD AUTO: 157 K/UL (ref 150–400)
PMV BLD AUTO: 11.6 FL (ref 8.9–12.9)
POTASSIUM SERPL-SCNC: 3.2 MMOL/L (ref 3.5–5.1)
PROTHROMBIN TIME: 22.4 SEC (ref 9–11.1)
RBC # BLD AUTO: 4.35 M/UL (ref 3.8–5.2)
RIGHT CCA DIST DIAS: 6.5 CM/S
RIGHT CCA DIST SYS: 31 CM/S
RIGHT CCA PROX DIAS: 16.5 CM/S
RIGHT CCA PROX SYS: 34.3 CM/S
RIGHT ECA DIAS: 7.69 CM/S
RIGHT ECA SYS: 58.6 CM/S
RIGHT ICA DIST DIAS: 6.7 CM/S
RIGHT ICA PROX DIAS: 7.2 CM/S
RIGHT ICA PROX SYS: 22.7 CM/S
RIGHT ICA/CCA SYS: 0.64
RIGHT SUBCLAVIAN DIAS: 0 CM/S
RIGHT SUBCLAVIAN SYS: 71.2 CM/S
RIGHT VERTEBRAL DIAS: 9.03 CM/S
RIGHT VERTEBRAL SYS: 54.7 CM/S
SODIUM SERPL-SCNC: 136 MMOL/L (ref 136–145)
THERAPEUTIC RANGE,PTTT: ABNORMAL SECS (ref 58–77)
WBC # BLD AUTO: 9.8 K/UL (ref 3.6–11)

## 2021-01-12 PROCEDURE — 2709999900 HC NON-CHARGEABLE SUPPLY

## 2021-01-12 PROCEDURE — 77030018786 HC NDL GD F/USND BARD -B

## 2021-01-12 PROCEDURE — C1751 CATH, INF, PER/CENT/MIDLINE: HCPCS

## 2021-01-12 PROCEDURE — 85730 THROMBOPLASTIN TIME PARTIAL: CPT

## 2021-01-12 PROCEDURE — 85027 COMPLETE CBC AUTOMATED: CPT

## 2021-01-12 PROCEDURE — 74011250637 HC RX REV CODE- 250/637: Performed by: INTERNAL MEDICINE

## 2021-01-12 PROCEDURE — 74011000258 HC RX REV CODE- 258: Performed by: INTERNAL MEDICINE

## 2021-01-12 PROCEDURE — 74011250636 HC RX REV CODE- 250/636: Performed by: INTERNAL MEDICINE

## 2021-01-12 PROCEDURE — 74011250637 HC RX REV CODE- 250/637: Performed by: GENERAL ACUTE CARE HOSPITAL

## 2021-01-12 PROCEDURE — 80048 BASIC METABOLIC PNL TOTAL CA: CPT

## 2021-01-12 PROCEDURE — 36415 COLL VENOUS BLD VENIPUNCTURE: CPT

## 2021-01-12 PROCEDURE — 85610 PROTHROMBIN TIME: CPT

## 2021-01-12 PROCEDURE — 76937 US GUIDE VASCULAR ACCESS: CPT

## 2021-01-12 RX ORDER — BACITRACIN 500 UNIT/G
1 PACKET (EA) TOPICAL AS NEEDED
Status: CANCELLED | OUTPATIENT
Start: 2021-01-12

## 2021-01-12 RX ORDER — POTASSIUM CHLORIDE 20 MEQ/1
40 TABLET, EXTENDED RELEASE ORAL
Status: COMPLETED | OUTPATIENT
Start: 2021-01-12 | End: 2021-01-12

## 2021-01-12 RX ORDER — WARFARIN 2 MG/1
2 TABLET ORAL ONCE
Status: DISCONTINUED | OUTPATIENT
Start: 2021-01-12 | End: 2021-01-12 | Stop reason: HOSPADM

## 2021-01-12 RX ORDER — ASPIRIN 81 MG/1
81 TABLET ORAL DAILY
Qty: 30 TAB | Refills: 2 | Status: SHIPPED | OUTPATIENT
Start: 2021-01-12 | End: 2021-12-30

## 2021-01-12 RX ORDER — HEPARIN 100 UNIT/ML
300 SYRINGE INTRAVENOUS AS NEEDED
Status: CANCELLED | OUTPATIENT
Start: 2021-01-12

## 2021-01-12 RX ORDER — FUROSEMIDE 20 MG/1
20 TABLET ORAL DAILY
Status: DISCONTINUED | OUTPATIENT
Start: 2021-01-12 | End: 2021-01-12 | Stop reason: HOSPADM

## 2021-01-12 RX ADMIN — POTASSIUM CHLORIDE 40 MEQ: 20 TABLET, EXTENDED RELEASE ORAL at 13:36

## 2021-01-12 RX ADMIN — ASPIRIN 81 MG: 81 TABLET, CHEWABLE ORAL at 08:58

## 2021-01-12 RX ADMIN — FUROSEMIDE 20 MG: 20 TABLET ORAL at 08:58

## 2021-01-12 RX ADMIN — CARVEDILOL 12.5 MG: 12.5 TABLET, FILM COATED ORAL at 08:58

## 2021-01-12 RX ADMIN — CEFTRIAXONE SODIUM 2 G: 2 INJECTION, POWDER, FOR SOLUTION INTRAMUSCULAR; INTRAVENOUS at 12:36

## 2021-01-12 NOTE — PROGRESS NOTES
Problem: Falls - Risk of  Goal: *Absence of Falls  Description: Document Pedro Martinez Fall Risk and appropriate interventions in the flowsheet.   Outcome: Progressing Towards Goal  Note: Fall Risk Interventions:  Mobility Interventions: Bed/chair exit alarm, Assess mobility with egress test, PT Consult for mobility concerns, OT consult for ADLs, Patient to call before getting OOB, Utilize walker, cane, or other assistive device    Mentation Interventions: Adequate sleep, hydration, pain control, Bed/chair exit alarm, Increase mobility, More frequent rounding, Reorient patient    Medication Interventions: Assess postural VS orthostatic hypotension, Bed/chair exit alarm, Patient to call before getting OOB, Teach patient to arise slowly    Elimination Interventions: Call light in reach, Bed/chair exit alarm, Stay With Me (per policy), Toilet paper/wipes in reach

## 2021-01-12 NOTE — DISCHARGE INSTRUCTIONS
HOSPITALIST DISCHARGE INSTRUCTIONS    NAME: Mihir Sams   :  1925   MRN:  [id number]     Date/Time:  2021 10:36 AM    ADMIT DATE: 2021     DISCHARGE DATE: 2021     DISCHARGE DIAGNOSIS:  Sepsis due to E Coli Bacteremia  NSTEMI  New onset systolic CHF    MEDICATIONS:  · It is important that you take the medication exactly as they are prescribed. · Keep your medication in the bottles provided by the pharmacist and keep a list of the medication names, dosages, and times to be taken in your wallet. · Do not take other medications without consulting your doctor. Pain Management: per above medications    What to do at Home    Recommended diet:  Cardiac Diet    Recommended activity: Activity as tolerated    If you have questions regarding the hospital related prescriptions or hospital related issues please call [facility] valery Hightower at [phone]. If you experience any of the following symptoms then please call your primary care physician or return to the emergency room if you cannot get hold of your doctor:  Fever, chills, nausea, vomiting, diarrhea, change in mentation, falling, bleeding, shortness of breath. Follow Up:  Dr. Cookie Garcia MD  you are to call and set up an appointment to see them in 7-10 days. Information obtained by :  I understand that if any problems occur once I am at home I am to contact my physician. I understand and acknowledge receipt of the instructions indicated above.                                                                                                                                            Physician's or R.N.'s Signature                                                                  Date/Time                                                                                                                                              Patient or Representative Signature Date/Time

## 2021-01-12 NOTE — PROGRESS NOTES
[facility] INTERDISCIPLINARY ROUNDS    Cardiopulmonary Care Interdisciplinary Rounds were held today to discuss patient's plan of care and outcomes. The following members were present: NP/Physician, Pharmacy, Nursing and Case Management. PLAN OF CARE:   Continue current treatment plan, pt to discharge to [facility] today at 1400.      Expected Length of Stay:  4d 19h

## 2021-01-12 NOTE — PROGRESS NOTES
DAVIDA: [facility]    10:57am-No further CM needs identified. CM notified pt's nurse of d/c.    10:56am- CM called Cherise with [facility] via phone to discuss d/c plan. CM informed Cherise that CM faxed discharge summary and that pt will be transported at 2:00pm    Transition of Care Plan to SNF/Rehab    SNF/Rehab Transition:  Patient has been accepted to [facility] and meets criteria for admission. Patient will transported by [facility] and expected to leave at 2:00pm.    Communication to Patient/Family:  Met with patient and Humaira Ko (identified care giver) and they are agreeable to the transition plan. Communication to SNF/Rehab:  Bedside RN, , has been notified to update the transition plan to the facility and call report (phone number [phone]) Room. Discharge information has been updated on the AVS.     Discharge instructions to be fax'd to facility at [facility] # [phone]). Nursing Please include all hard scripts for controlled substances, med rec and dc summary, and AVS in packet.      SNF/Rehab Transition:  Patient to follow-up with Home Health: None  PCP/Specialist: Dr. Nickolas Hilliard and confirmed with facility, [facility], can manage the patient care needs for the following:    Tre Neighbours with (X) only those applicable:    Medication:  [x]  Medications will be available at the facility  [x]  IV Antibiotics   []  Controlled Substance - hard copy to be sent with patient   []  Weekly Labs   Documents:  [] Hard RX  [x] MAR  [] Kardex  [x] AVS  []Transfer Summary  [x]Discharge   Equipment:  []  CPAP/BiPAP  []  Wound Vacuum  []  Beard or Urinary Device  [x]  PICC/Central Line  []  Nebulizer  []  Ventilator   Treatment:  []Isolation (for MRSA, VRE, etc.)  []Surgical Drain Management  []Tracheostomy Care  []Dressing Changes  []Dialysis with transportation and chair time   []PEG Care  []Oxygen  []Daily Weights for Heart Failure   Dietary:  []Any diet limitations  []Tube Feedings   []Total Parenteral Management (TPN)   Eligible for Medicaid Long Term Services and Supports  Yes:  [] Eligible for medical assistance or will become eligible within 180 days and UAI completed. [] Provider/Patient and/or support system has requested screening. [] UAI copy provided to patient or responsible party,   [] UAI unavailable at discharge will send once processed to SNF provider. [] UAI unavailable at discharged mailed to patient  No:   [x] Private pay and is not financially eligible for Medicaid within the next 180 days. [] Reside out-of-state. [] A residents of a state owned/operated facility that is licensed  by [facility] or [facility]  [] Enrollment in [facility] hospice services  [] [address]  [] Patient /Family declines to have screening completed or provide financial information for screening     Financial Resources:  Medicaid    [] Initiated and application pending   [] Full coverage     Advanced Care Plan:  []Surrogate Decision Maker of Care  []POA  [x]Communicated Code Status DNR   Other       10:20am- set up medical transport with [facility] via [employer].  time 2:00pm. [facility] paperwork placed in pt's chart. 10:10am- CM faxed clinical documentation to [facility] [phone]     10:00am-Cherise Vargas with [facility] ([phone]) called CM via phone. CM inquired with Cherise if pt is able to come to [facility] today. [place] stated that pt is able to come. She will be in room 9345.     9:04am-Cherise Vargas with [facility] ([phone]) called CM via phone. Cherise inquired with CM about pt's IV antibiotics. CM provided Cherise with information regarding IV antibiotics. Care Management Interventions  PCP Verified by CM:  Yes  Palliative Care Criteria Met (RRAT>21 & CHF Dx)?: No  Mode of Transport at Discharge: BLS(Pt will be transport by medical transport)  Hospital Transport Time of Discharge: 1400  Transition of Care Consult (CM Consult): SNF([facility])  Partner SNF: No  Discharge Durable Medical Equipment: No  Physical Therapy Consult: Yes  Occupational Therapy Consult: Yes  Speech Therapy Consult: Yes  Current Support Network:  Other  Confirm Follow Up Transport: Other (see comment)  [address] Provided?: No  Discharge Location  Discharge Placement: Skilled nursing facility([facility])    George Powell [facility]  [phone]

## 2021-01-12 NOTE — PROGRESS NOTES
End of Shift Note    Bedside shift change report given to GIRISH Johnson (oncoming nurse) by Nia Antunez RN (offgoing nurse). Report included the following information SBAR    Shift worked:  7pm-7am     Shift summary and any significant changes:     Uneventful night. Pt for possible discharge back to [facility] today. Concerns for physician to address:       Zone phone for oncoming shift:          Activity:  Activity Level: Up with Assistance  Number times ambulated in hallways past shift: 0  Number of times OOB to chair past shift: 2    Cardiac:   Cardiac Monitoring: Yes      Cardiac Rhythm: Atrial fibrillation    Access:   Current line(s): PIV     Genitourinary:   Urinary status: voiding    Respiratory:   O2 Device: Room air  Chronic home O2 use?: NO  Incentive spirometer at bedside: NO     GI:  Last Bowel Movement Date: 01/09/21  Current diet:  DIET CARDIAC Regular  Passing flatus: YES  Tolerating current diet: YES       Pain Management:   Patient states pain is manageable on current regimen: YES    Skin:  Neel Score: 17  Interventions: float heels    Patient Safety:  Fall Score:  Total Score: 3  Interventions: pt to call before getting OOB  High Fall Risk: Yes    Length of Stay:  Expected LOS: 4d 19h  Actual LOS: 1915 Monae Barnes RN

## 2021-01-12 NOTE — PROGRESS NOTES
Pharmacy Daily Dosing of Warfarin    Indication: a fib  Goal INR: 2-3    PTA Warfarin Dose: called [facility] she is independent and manages her own medications (MD doctor dosing as of 1/4 is 2.5 mg every day, except on Monday and Friday. No dose on Monday and Friday)    Concurrent anticoagulants: none  Concurrent antiplatelet: aspirin    Major Interacting Medications   Drugs that may increase INR: no major  Drugs that may decrease INR: no major    Conditions that may increase/decrease INR (CHF, C. diff, cirrhosis, thyroid disorder, hypoalbuminemia): None    Labs:  Recent Labs     01/12/21  0416 01/11/21  0356 01/10/21  0329   INR 2.2* 3.1* 3.2*   HGB 12.0  --  12.6     --  148*     Impression/Plan:   Will order 2 mg x 1 dose tonight in case she is still here  Daily INR  CBC w/o diff every other day      Pharmacy will follow daily and adjust the dose as appropriate.     Thanks  Bhargav Jackson, PHARMD

## 2021-01-12 NOTE — PROGRESS NOTES
Progress Note      1/12/2021 11:49 AM  NAME: Aury Russell   MRN:  [id number]   Admit Diagnosis: Elevated troponin [R77.8]; Lactic acid acidosis [E87.2]; Confusion [R41.0]     Assessment:     81 yo female admitted with acute infectious process, etiology yet to be defined. Fever 103. 2 on presentation to ER . CXR no infiltrates. Found to have UTI with Gram Negative bacteremia , sepsis .          Chronic Atrial Fibrillation ,  Noted in the records at least back 20 2015. RVR in setting of acute infectious process. Chronic anticoagulation with Warfarin , therapeutic level INR     Elevated Troponin , likely Type 2 NSTEMI secondary  to acute illness. Hypertension   Advanced age and Frailty . Acute / Chronic kidney failure. COVID rapid testing negative. COVID testing pending . Leukocytosis in setting of acute infection . Elevated Lacitic Acid . EKG Atrial Fibrillation . LBBB,   CKD stage 3 - 4. DNR .     1/12  I reviewed the Echo  . It shows LV EF ~ 40% . Global.    There is significant Aortic valve sclerosis with mild AI . There is decreased leaflet mobility but by doppler there is not significant gradient to support AS . There is mild  - moderate Mitral regurgitation . As regard further treatment , in this 81 yo patient , would be very conservative. I think it would be best to continue medical management with ASA , B  Blocker . There is no role for invasive intervention . Can continue Atorvastatin if you want , but at age 80 I don't see much need for it .       Resident [facility] . Plan:     Hemodynamically  stable at this time        No cardiac changes. 1/12  Cardiac stable . Will see on request.       [x]        High complexity decision making was performed    Subjective:     Aury Russell denies chest pain, dyspnea. Discussed with RN events overnight.      Patient Active Problem List   Diagnosis Code    Hip fracture ([facility] S72.009A    A-fib (HCC) I48.91    PRACHI (acute kidney injury) ([facility] N17.9    Fever R50.9    Elevated troponin R77.8    Breast mass, right N63.10    Slurred speech R47.81    Confusion R41.0    Lactic acid acidosis E87.2       Review of Systems:    Symptom Y/N Comments  Symptom Y/N Comments   Fever/Chills N   Chest Pain N    Poor Appetite N   Edema N    Cough N   Abdominal Pain N    Sputum N   Joint Pain N    SOB/ARREOLA N   Pruritis/Rash N    Nausea/vomit N   Tolerating PT/OT Y    Diarrhea N   Tolerating Diet Y    Constipation N   Other       Could NOT obtain due to:      Objective:      Physical Exam:    Last 24hrs VS reviewed since prior progress note. Most recent are:    Visit Vitals  BP (!) 115/45   Pulse (!) 57   Temp 97.5 °F (36.4 °C)   Resp 18   Ht 5' 7\" (1.702 m)   Wt 74.4 kg (164 lb)   SpO2 94%   BMI 25.69 kg/m²       Intake/Output Summary (Last 24 hours) at 1/12/2021 1035  Last data filed at 1/12/2021 0851  Gross per 24 hour   Intake 150 ml   Output    Net 150 ml        General Appearance: Well developed, well nourished, alert & oriented x 3,    no acute distress. Ears/Nose/Mouth/Throat: Hearing grossly normal.  Neck: Supple. Chest: Lungs clear to auscultation bilaterally. Cardiovascular: Regular rate and rhythm, S1S2 normal, no murmur. Abdomen: Soft, non-tender, bowel sounds are active. Extremities: No edema bilaterally. Skin: Warm and dry.     PMH/SH reviewed - no change compared to H&P    Data Review    Telemetry: normal sinus rhythm     Lab Data Personally Reviewed:    Recent Labs     01/12/21  0416 01/10/21  0329   WBC 9.8 15.1*   HGB 12.0 12.6   HCT 38.3 40.8    148*   LABRCNT(INR:3,PTP:3,APTT:3,)  Recent Labs     01/12/21  0416 01/11/21  0356 01/10/21  0329    132* 135*   K 3.2* 3.8 3.6    100 102   CO2 24 24 24   BUN 69* 63* 41*   CREA 1.77* 1.91* 1.72*   * 97 151*   CA 8.2* 8.8 8.8   MG  --   --  2.6*   LABRCNT(CPK:3,CpKMB:3,ckndx:3,troiq:3)  Lab Results Component Value Date/Time    Cholesterol, total 107 01/10/2021 03:29 AM    HDL Cholesterol 26 01/10/2021 03:29 AM    LDL, calculated 60.2 01/10/2021 03:29 AM    Triglyceride 104 01/10/2021 03:29 AM    CHOL/HDL Ratio 4.1 01/10/2021 03:29 AM   LABRCNT(sgot:3,gpt:3,ap:3,tbiL:3,TP:3,ALB:3,GLOB:3,ggt:3,aml:3,amyp:3,lpse:3,hlpse:3)No results for input(s): PH, PCO2, PO2 in the last 72 hours. Lab Results   Component Value Date/Time    Cholesterol, total 107 01/10/2021 03:29 AM    HDL Cholesterol 26 01/10/2021 03:29 AM    LDL, calculated 60.2 01/10/2021 03:29 AM    Triglyceride 104 01/10/2021 03:29 AM    CHOL/HDL Ratio 4.1 01/10/2021 03:29 AM   MEDTABLETimrogers Butts MD  No results for input(s): PH, PCO2, PO2 in the last 72 hours.     Medications Personally Reviewed:    Current Facility-Administered Medications   Medication Dose Route Frequency    furosemide (LASIX) tablet 20 mg  20 mg Oral DAILY    cefTRIAXone (ROCEPHIN) 2 g in 0.9% sodium chloride (MBP/ADV) 50 mL MBP  2 g IntraVENous Q24H    atorvastatin (LIPITOR) tablet 40 mg  40 mg Oral QHS    carvediloL (COREG) tablet 12.5 mg  12.5 mg Oral BID WITH MEALS    metoprolol (LOPRESSOR) injection 2.5 mg  2.5 mg IntraVENous Q6H PRN    WARFARIN INFORMATION NOTE (COUMADIN)   Other QPM    sodium chloride (NS) flush 5-40 mL  5-40 mL IntraVENous Q8H    sodium chloride (NS) flush 5-40 mL  5-40 mL IntraVENous PRN    acetaminophen (TYLENOL) tablet 650 mg  650 mg Oral Q6H PRN    Or    acetaminophen (TYLENOL) suppository 650 mg  650 mg Rectal Q6H PRN    polyethylene glycol (MIRALAX) packet 17 g  17 g Oral DAILY PRN    promethazine (PHENERGAN) tablet 12.5 mg  12.5 mg Oral Q6H PRN    Or    ondansetron (ZOFRAN) injection 4 mg  4 mg IntraVENous Q6H PRN    aspirin chewable tablet 81 mg  81 mg Oral DAILY         Chuy Blackwell MD

## 2021-01-12 NOTE — ROUTINE PROCESS
TRANSFER - OUT REPORT: 
 
Verbal report given to [address] (name) on Farrah Esqueda  being transferred to [facility] for routine progression of care Report consisted of patients Situation, Background, Assessment and  
Recommendations(SBAR). Information from the following report(s) SBAR and Recent Results was reviewed with the receiving nurse. Lines:  
Peripheral IV 01/08/21 Left Wrist (Active) Site Assessment Clean, dry, & intact 01/12/21 7464 Phlebitis Assessment 0 01/12/21 0851 Infiltration Assessment 0 01/12/21 0520 Dressing Status Clean, dry, & intact 01/12/21 0520 Dressing Type Tape;Transparent 01/12/21 0520 Hub Color/Line Status Blue;Flushed 01/12/21 0520 Opportunity for questions and clarification was provided. Patient transported with: To go to facility with transport, will have one prescription

## 2021-01-12 NOTE — ROUTINE PROCESS
Hospital to [address] [place] [address] 80 y.o.   female Shira 34   Room: 2286/01    [facility] [address] [place]  [address] Phone# :  [phone] [address] 
[facility] 2 CARDIOPULMONARY CARE 
[address] [facility] [place] [address] Dept: [phone] Loc: [id number] SITUATION  
  
  BACKGROUND Allergies: Allergies Allergen Reactions  Latex Itching Past Medical History:  
Diagnosis Date  Arthritis  Atrial fibrillation ([facility] 12/2015  
 post hip surgery  Chronic obstructive pulmonary disease ([facility]  Gastrointestinal disorder   
 gallstones  Hypertension  Iron deficiency  Leg ulcer ([facility]  Macular degeneration  OA (osteoarthritis) of shoulder  Spinal stenosis   
 lumbar Past Surgical History:  
Procedure Laterality Date  HX HEENT    
 cataract surgery with lens implants  HX ORTHOPAEDIC    
 right hip surgery Medications Prior to Admission Medication Sig  
 metoprolol succinate (TOPROL-XL) 25 mg XL tablet Take 25 mg by mouth daily.  potassium chloride SR (KLOR-CON 10) 10 mEq tablet Take 10 mEq by mouth two (2) times a day.  furosemide (LASIX) 80 mg tablet Take 80 mg by mouth daily.  cholecalciferol (Vitamin D3) (5000 Units/125 mcg) tab tablet Take 5,000 Units by mouth daily.  warfarin (COUMADIN) 2.5 mg tablet Take 2.5 mg by mouth five (5) days a week. 2.5 mg every day, except no dose on Monday and Friday  acetaminophen (TYLENOL) 500 mg tablet Take 1,000 mg by mouth nightly. Hard scripts included in transfer packet Vaccinations:   
Immunization History Administered Date(s) Administered  Influenza Vaccine 10/01/2017  Tdap 04/16/2015 Readmission Risks:    Known Risks: none The Charlson CoMorbitiy Index tool is an evidenced based tool that has more automatic generated information. The tool looks at many different items such as the age of the patient, how many times they were admitted in the last calendar year, current length of stay in the hospital and their diagnosis. All of these items are pulled automatically from information documented in the chart from various places and will generate a score that predicts whether a patient is at low (less than 13), medium (13-20) or high (21 or greater) risk of being readmitted. ASSESSMENT Temp: 97.3 °F (36.3 °C) (01/12/21 1119) Pulse (Heart Rate): (!) 57 (01/12/21 1119) Resp Rate: 15 (01/12/21 1119)           BP: (!) 108/46 (01/12/21 1119) O2 Sat (%): 94 % (01/12/21 1119) Weight: 74.4 kg (164 lb)    Height: 5' 7\" (170.2 cm) (01/09/21 1420) If above not within 1 hour of discharge: 
 
BP:_____  P:____  R:____ T:_____ O2 Sat: ___%  O2: ______ Active Orders Diet DIET CARDIAC Regular Orientation: oriented to time, place, person and situation Active Behaviors: None Active Lines/Drains:  (Peg Tube / Beard / CL or S/L?): yes Urinary Status: Voiding     Last BM: Last Bowel Movement Date: 01/09/21 Skin Integrity: Intact Mobility: Slightly limited Weight Bearing Status: WBAT (Weight Bearing as Tolerated) Gait Training Assistive Device: Walker, rolling Ambulation - Level of Assistance: Modified independent Distance (ft): 30 Feet (ft) Lab Results Component Value Date/Time Glucose 105 (H) 01/12/2021 04:16 AM  
 Hemoglobin A1c 6.6 (H) 01/10/2021 03:29 AM  
 INR 2.2 (H) 01/12/2021 04:16 AM  
 INR 3.1 (H) 01/11/2021 03:56 AM  
 HGB 12.0 01/12/2021 04:16 AM  
 HGB 12.6 01/10/2021 03:29 AM  
  
  RECOMMENDATION See After Visit Summary (AVS) for: · Discharge instructions · After [facility] · Special equipment needed (entered pre-discharge by Care Management) · Medication Reconciliation · Follow up Appointment(s) Report given/sent by:  Elisa Quintero RN Verbal report given to: see chart   Estimated discharge time:  1/12/2021 at 2:00 pm

## 2021-01-12 NOTE — PROGRESS NOTES
Midline insertion procedure note:  Pt has very limited vascular access. Procedure explained to patient along with risks and benefits. Procedure teaching completed. Pre procedure assessment done. Patient denies questions or concerns at this time. Midline sign over the [facility]. Maximum sterile barrier precautions observed throughout procedure. Lidocaine 1% 3ml sc injected to site prior to access the vein. Cannulated right basilic vein using ultrasound guidance. Inserted 4.5 Fr. single lumen midline to right arm. Blood return verified and flushed with 20ml normal saline. Sterile dressing applied with Biopatch, StatLock and occlusive dressing as per protocol. Curos caps applied to port. Patient tolerated procedure well with minimal blood loss. Reason for access : long term IV antibiotics   Complications related to insertion : none  See nursing message on [employer] for midline reminders. Midline is CT and MRI compatible. Inserted by : GIRISH Mariscal-BC / Vascular Access Nurse  Assisted by : DAVID Celis, RN, [facility]-BC / Vascular Access Nurse  Catheter Length : 15 cm   External Length : 0 cm   Right arm circumference : 27 cm  Catheter occupies 8% of vein. Type of Midline: ARROW  Ref#: [id number]  Lot#: [id number]  Expiration Date: 2021-10-31  Informed primary nurse GIRISH Celestin midline is ready for use and to hang new infusion tubing prior to use.     Ayaka Duran RN VA/BC   Vascular Access Nurse

## 2021-01-12 NOTE — PROGRESS NOTES
Physician Progress Note      PATIENTJude   CSN #:                  [id number]  :                       1925  ADMIT DATE:       2021 6:29 PM  [address] [place] DATE:  RESPONDING  PROVIDER #:        Marvin Vasquez MD          QUERY TEXT:    Pt admitted with Sepsis and has systolic CHF documented. If possible, please document in progress notes and discharge summary further specificity regarding the acuity of CHF:    The medical record reflects the following:  Risk Factors: Hx: A-Fib / COPD / HTN / JOLYNN /  Clinical Indicators: 103.2 107 26 135/76 95% on RA. ..... ED VS: Tmax: 103.2; HR: 105-130; RR: 22-37. .. 98% on 12L NRB in ED. Augustine Singh Tnl: 1.02 ^ 2.28 ^ 3.50 ^ 5.76. .... Augustine Singh pBNP: [id number]. .. Augustine Singh CXR: Chronic interstitial lung disease. ... Augustine Singh EKG: A-Fib w/ RVR . .. Kathrene Samantha Worleyene Samantha Repeat EKG: Paced Rhythm. .. Avahrene Samantha Singh Echo: LV: Estimated LVEF is 30 - 35%. Visually measured ejection fraction. Normal cavity size and wall thickness. Moderately reduced systolic function. ... Treatment: IVF Bolus in ED; IV NS gtt @ 75ml/hr in ED; CXR; Echo; Cards consult; IV Metoprolol in ED; IV Cardizem in ED;  CXR; EKG; Echo; Start ASA/Statin/ BB; Trend Tnl    Thank you,    Hali Bruno  [facility]    Options provided:  -- Acute on Chronic Systolic CHF/HFrEF  -- Acute on Chronic Diastolic CHF/HFpEF  -- Acute on Chronic Systolic and Diastolic CHF  -- Acute Systolic CHF/HFrEF  -- Acute Diastolic CHF/HFpEF  -- Acute Systolic and Diastolic CHF  -- Chronic Systolic CHF/HFrEF  -- Chronic Diastolic CHF/HFpEF  -- Chronic Systolic and Diastolic CHF  -- Other - I will add my own diagnosis  -- Disagree - Not applicable / Not valid  -- Disagree - Clinically unable to determine / Unknown  -- Refer to Clinical Documentation Reviewer    PROVIDER RESPONSE TEXT:    This patient is in acute on chronic systolic CHF/HFrEF.     Query created by: Nyla Montaño on 2021 4:05 PM      Electronically signed by:  Marvin Vasquez MD 2021 2:30 PM

## 2021-01-12 NOTE — DISCHARGE SUMMARY
Hospitalist Discharge Summary     Patient ID:  Francis Borges  [id number]  83 y.o.  4/30/1925 1/8/2021    PCP on record: Fritz Miranda MD    Admit date: 1/8/2021  Discharge date and time: 1/12/2021    DISCHARGE DIAGNOSIS:  Sepsis severe  UTI  Gram negative bacteremia  Acute hypoxic respiratory failure. Interstitial lung disease  Metabolic encephalopathy  Elevated troponin- type 2 MI/  New onset Acute systolic congestive heart failure  Mildly elevated creatinine, not PRACHI on CKD stage III  A. Fib with RVR   Right breast mass      CONSULTATIONS:  IP CONSULT TO CARDIOLOGY  IP CONSULT TO CARDIOLOGY  IP CONSULT TO NEUROLOGY    Excerpted HPI from H&P of Shana Jain MD:    Angelica Briggs is a 80 y.o.  female with past medical history of arthritis, atrial fibrillation on Coumadin, Rt breast mass, COPD, CKD stage III, hypertension, chronic venous stasis, spinal stenosis, who presents with an episode of confusion. Patient from [facility], was at her usual state of health until tonight when he was found to have some slurred speech and was confused, as per report. When EMS arrived, patient had no focal neurological deficit and she was back at her baseline. During my evaluation, patient was alert and oriented x3, was able to get off bedside commode and get back to her bed with minimal assistance, she denies any complaints and she is telling her that she got stuck in her wheelchair and could not get up and then she was evaluated and sent to the hospital for further management, and she did not want to come to the hospital.  Patient denies cough, shortness of breath, fever, chills, abdominal pain, chest pain, nausea/vomiting/diarrhea, change in urinary habits, falls, worsening lower extremity swelling. Patient stated that she has been treated for UTI 3 weeks ago. Denies any sick contacts. We were asked to admit for work up and evaluation of the above problems.    At the emergency room, heart rate 115, maximum temperature 103.2, breathing at 26 breaths/min with sats 95% on room air, /76. Blood work remarkable for lactic acid of 3.3, WBC 14.1, INR 2.4, fibrinogen 552, D-dimer 3.6, BUN 28, creatinine 1.6, bilirubin 1.7, , troponin I 0.02. Rapid Covid test negative. EKG with A. fib RVR with rates at 111 left axis deviation, left axis deviation.     ______________________________________________________________________  DISCHARGE SUMMARY/HOSPITAL COURSE:  for full details see H&P, daily progress notes, labs, consult notes. Sepsis severe  UTI  Gram negative bacteremia  Acute hypoxic respiratory failure. Interstitial lung disease  Metabolic encephalopathy  -WBC 22.0, Lactic acid 2.1 on admission  -Leukocytosis resolved  -Blood culture positive E. coli. Urine culture also growing E. coli  -Repeat blood culture obtained and negative so far  -Patient was started empirically on Vanco and cefepime initially  -transitioned to ceftriaxone for convenience of dosing  for 12 more days. -CT chest shows no acute findings.  -Chest x-ray shows enlarged cardiac silhouette as compared to yesterday chest x-ray. BNP 13 892. -Was given IV Lasix yesterday with improvement in hypoxia  -COVID-19 is negative.  -Echo pending showed ejection fraction of 30 to 35%  -Previous Ef is 55-60%  -Neurology consultedrecommended the same medical management. Holding onto the MRI as per neurology recommendations. Patient strength and mental status is back to baseline. -PT OT.     Elevated troponin- type 2 MI/ New Onset systolic congestive heart failure  Troponin peaked to 5.7  Likely type II MI secondary to sepsis and bacteremia. Cardiology consultedappreciate input medical management recommended due to patient's age and likelihood of type II MI  Ejection fraction is 30 to 35%  Continue Toprol  Continue aspirin  Intolerance to statins  . Anticoagulation with warfarin  No ACEI or angiotensin receptor blocker due to renal function  C/w lasix   Patient does not want any intervention and just want medical management     Mildly elevated creatinine, not PRACHI on CKD stage III   BUN/creatinine31/1.72, may be the baseline.    -Creatinine is stable around 1.7     A. Fib with RVR   Rate controlled  Continue beta-blocker  S/P Pacemaker  INR therapeutic     Right breast mass  She has is for years and declines any further work-up/treatment/surgical interventions         _______________________________________________________________________  Patient seen and examined by me on discharge day. Pertinent Findings:  Gen:    Not in distress  Chest: Clear lungs  CVS:   Irregular rhythm. No edema  Abd:  Soft, not distended, not tender  Neuro:  Alert, oriented x4  _______________________________________________________________________  DISCHARGE MEDICATIONS:   Current Discharge Medication List      START taking these medications    Details   aspirin delayed-release 81 mg tablet Take 1 Tab by mouth daily. Qty: 30 Tab, Refills: 2      cefTRIAXone 2 gram 2 g IVPB 2 g by IntraVENous route every twenty-four (24) hours. Qty: 12 Dose, Refills: 0         CONTINUE these medications which have NOT CHANGED    Details   metoprolol succinate (TOPROL-XL) 25 mg XL tablet Take 25 mg by mouth daily. potassium chloride SR (KLOR-CON 10) 10 mEq tablet Take 10 mEq by mouth two (2) times a day. furosemide (LASIX) 80 mg tablet Take 80 mg by mouth daily. cholecalciferol (Vitamin D3) (5000 Units/125 mcg) tab tablet Take 5,000 Units by mouth daily. warfarin (COUMADIN) 2.5 mg tablet Take 2.5 mg by mouth five (5) days a week. 2.5 mg every day, except no dose on Monday and Friday      acetaminophen (TYLENOL) 500 mg tablet Take 1,000 mg by mouth nightly. Patient Follow Up Instructions:    Activity: Activity as tolerated  Diet: Cardiac Diet  Wound Care: None needed    Follow-up with PCP and Cardiology in 1-2 weeks  Follow-up tests/labs none  Follow-up Information     Follow up With Specialties Details Why Contact Info    Mary Jane Padilla MD Family Medicine In 1 week  [address]      Jelani Bullock MD Cardiology In 2 weeks  [address]  [address]  [address] [address]  [phone]          ________________________________________________________________    Risk of deterioration: High    Condition at Discharge:  Stable  __________________________________________________________________    Disposition  SNF/LTC    ____________________________________________________________________    Code Status: DNR/DNI  ___________________________________________________________________      Total time in minutes spent coordinating this discharge (includes going over instructions, follow-up, prescriptions, and preparing report for sign off to her PCP) :  40 minutes    Signed:  Walter Glez MD

## 2021-01-14 LAB
BACTERIA SPEC CULT: ABNORMAL
BACTERIA SPEC CULT: ABNORMAL
BACTERIA SPEC CULT: NORMAL
SERVICE CMNT-IMP: ABNORMAL
SERVICE CMNT-IMP: NORMAL

## 2021-01-15 LAB
BACTERIA SPEC CULT: NORMAL
SERVICE CMNT-IMP: NORMAL

## 2021-12-29 ENCOUNTER — OFFICE VISIT (OUTPATIENT)
Dept: SURGERY | Age: 86
End: 2021-12-29
Payer: MEDICARE

## 2021-12-29 VITALS — BODY MASS INDEX: 25.74 KG/M2 | HEIGHT: 67 IN | WEIGHT: 164 LBS

## 2021-12-29 DIAGNOSIS — C50.911 MALIGNANT NEOPLASM OF RIGHT FEMALE BREAST, UNSPECIFIED ESTROGEN RECEPTOR STATUS, UNSPECIFIED SITE OF BREAST (HCC): Primary | ICD-10-CM

## 2021-12-29 DIAGNOSIS — C50.911 LOCALLY ADVANCED CARCINOMA OF BREAST, RIGHT (HCC): Primary | ICD-10-CM

## 2021-12-29 PROCEDURE — 99203 OFFICE O/P NEW LOW 30 MIN: CPT | Performed by: SURGERY

## 2021-12-29 NOTE — LETTER
12/29/2021 5:30 PM    Patient:  Keira Hickman   YOB: 1925  Date of Visit: 12/29/2021      Dear Dr. Parul Schmidt: Thank you for referring Ms. Sharona Robertson to me for evaluation/treatment. Below are the relevant portions of my assessment and plan of care. If you have questions, please do not hesitate to call me. I look forward to following Ms. Thompson along with you.         Sincerely,      Eva Donis MD

## 2021-12-29 NOTE — PROGRESS NOTES
HISTORY OF PRESENT ILLNESS  Melanie Bower is a 80 y.o. female. HPI  NEW patient consult referred by  Dr. Luis Shelley for RIGHT breast mass. The area has been noticeable for about 5 years and it has started to come through the skin. She has also noticed an odor coming from the lesion.      Breast history-   Has has bilateral breast biopsies in 1970's for cysts.      Family History:  None     Breast imaging-   None       Past Medical History:   Diagnosis Date    Arthritis     Atrial fibrillation ([address] 2015    post hip surgery    Chronic obstructive pulmonary disease (HCC)     Gastrointestinal disorder     gallstones    Hypertension     Iron deficiency     Leg ulcer (HCC)     Macular degeneration     OA (osteoarthritis) of shoulder     Spinal stenosis     lumbar       Past Surgical History:   Procedure Laterality Date    HX HEENT      cataract surgery with lens implants    HX ORTHOPAEDIC      right hip surgery       Social History     Socioeconomic History    Marital status:      Spouse name: Not on file    Number of children: Not on file    Years of education: Not on file    Highest education level: Not on file   Occupational History    Not on file   Tobacco Use    Smoking status: Former Smoker     Years: 60.00     Quit date: 1991     Years since quittin.7    Smokeless tobacco: Never Used   Substance and Sexual Activity    Alcohol use: Yes    Drug use: Not on file    Sexual activity: Not on file   Other Topics Concern    Not on file   Social History Narrative    Not on file     Social Determinants of Health     Financial Resource Strain:     Difficulty of Paying Living Expenses: Not on file   Food Insecurity:     Worried About Running Out of Food in the Last Year: Not on file    Laura of Food in the Last Year: Not on file   Transportation Needs:     Lack of Transportation (Medical): Not on file    Lack of Transportation (Non-Medical):  Not on file   Physical Activity:     Days of Exercise per Week: Not on file    Minutes of Exercise per Session: Not on file   Stress:     Feeling of Stress : Not on file   Social Connections:     Frequency of Communication with Friends and Family: Not on file    Frequency of Social Gatherings with Friends and Family: Not on file    Attends Judaism Services: Not on file    Active Member of [address] [place] [address] or Organizations: Not on file    Attends Club or Organization Meetings: Not on file    Marital Status: Not on file   Intimate Partner Violence:     Fear of Current or Ex-Partner: Not on file    Emotionally Abused: Not on file    Physically Abused: Not on file    Sexually Abused: Not on file   Housing Stability:     Unable to Pay for Housing in the Last Year: Not on file    Number of [place] in the Last Year: Not on file    Unstable Housing in the Last Year: Not on file       Current Outpatient Medications on File Prior to Visit   Medication Sig Dispense Refill    aspirin delayed-release 81 mg tablet Take 1 Tab by mouth daily. 30 Tab 2    cefTRIAXone 2 gram 2 g IVPB 2 g by IntraVENous route every twenty-four (24) hours. 12 Dose 0    metoprolol succinate (TOPROL-XL) 25 mg XL tablet Take 25 mg by mouth daily.  potassium chloride SR (KLOR-CON 10) 10 mEq tablet Take 10 mEq by mouth two (2) times a day.  furosemide (LASIX) 80 mg tablet Take 80 mg by mouth daily.  cholecalciferol (Vitamin D3) (5000 Units/125 mcg) tab tablet Take 5,000 Units by mouth daily.  warfarin (COUMADIN) 2.5 mg tablet Take 2.5 mg by mouth five (5) days a week. 2.5 mg every day, except no dose on Monday and Friday      acetaminophen (TYLENOL) 500 mg tablet Take 1,000 mg by mouth nightly. No current facility-administered medications on file prior to visit. Allergies   Allergen Reactions    Latex Itching       OB History    No obstetric history on file.       Obstetric Comments   Menarche 12, LMP 52, # of children 2, age of 4st delivery 24, Hysterectomy/oophorectomy No/No, Breast bx Yes, history of breast feeding No, BCP No, Hormone therapy No             ROS  Eyes: Positive for blurred vision. Respiratory: Positive for shortness of breath and wheezing. Endo/Heme/Allergies: Bruises/bleeds easily. All other systems reviewed and are negative. Physical Exam  Exam conducted with a chaperone present. Cardiovascular:      Rate and Rhythm: Normal rate and regular rhythm. Heart sounds: Normal heart sounds. Pulmonary:      Breath sounds: Normal breath sounds. Chest:   Breasts: Breasts are symmetrical.      Right: Bleeding, mass and skin change present. No swelling, inverted nipple, nipple discharge, tenderness, axillary adenopathy or supraclavicular adenopathy. Left: Normal. No swelling, bleeding, inverted nipple, mass, nipple discharge, skin change, tenderness, axillary adenopathy or supraclavicular adenopathy. Lymphadenopathy:      Cervical:      Right cervical: No superficial, deep or posterior cervical adenopathy. Left cervical: No superficial, deep or posterior cervical adenopathy. Upper Body:      Right upper body: No supraclavicular or axillary adenopathy. Left upper body: No supraclavicular or axillary adenopathy. ASSESSMENT and PLAN    ICD-10-CM ICD-9-CM    1. Locally advanced carcinoma of breast, right ([facility]  C50.911 174.9      New patient presents for evaluation of RIGHT breast mass. , and is doing well overall. Pt presents will locally advanced breast cancer with skin metastases. Will schedule a palliative mastectomy. Mastectomy is an inpatient operation, with overnight stay in the hospital, and expected 4-6 week recovery time. Total time spent was 40 minutes.     Written by Odessa Burt, as dictated by Dr. Joan Yu MD.

## 2021-12-29 NOTE — PATIENT INSTRUCTIONS

## 2021-12-29 NOTE — PROGRESS NOTES
HISTORY OF PRESENT ILLNESS  Anuradha Laguna is a 80 y.o. female. HPI NEW patient consult referred by  Dr. Afshan Barrientos for RIGHT breast mass. The area has been noticeable for about 5 years and it has started to come through the skin. She has also noticed an odor coming from the lesion. Breast history-   Has has bilateral breast biopsies on 1970's for cysts. Family History:  None    Breast imaging-   None      Review of Systems   Eyes: Positive for blurred vision. Respiratory: Positive for shortness of breath and wheezing. Endo/Heme/Allergies: Bruises/bleeds easily. All other systems reviewed and are negative.       Physical Exam    ASSESSMENT and PLAN  {ASSESSMENT/PLAN:17310}

## 2021-12-30 ENCOUNTER — HOSPITAL ENCOUNTER (OUTPATIENT)
Dept: PREADMISSION TESTING | Age: 86
Discharge: HOME | End: 2021-12-30
Payer: MEDICARE

## 2021-12-30 ENCOUNTER — TRANSCRIBE ORDER (OUTPATIENT)
Dept: REGISTRATION | Age: 86
End: 2021-12-30

## 2021-12-30 VITALS
TEMPERATURE: 98.5 F | SYSTOLIC BLOOD PRESSURE: 129 MMHG | HEART RATE: 86 BPM | WEIGHT: 150.35 LBS | DIASTOLIC BLOOD PRESSURE: 71 MMHG | HEIGHT: 65 IN | RESPIRATION RATE: 18 BRPM | BODY MASS INDEX: 25.05 KG/M2

## 2021-12-30 DIAGNOSIS — Z01.812 PRE-PROCEDURE LAB EXAM: Primary | ICD-10-CM

## 2021-12-30 LAB
APTT PPP: 41.2 SEC (ref 22.1–31)
ATRIAL RATE: 38 BPM
BASOPHILS # BLD: 0.1 K/UL (ref 0–0.1)
BASOPHILS NFR BLD: 1 % (ref 0–1)
CALCULATED R AXIS, ECG10: 94 DEGREES
CALCULATED T AXIS, ECG11: 40 DEGREES
DIAGNOSIS, 93000: NORMAL
DIFFERENTIAL METHOD BLD: ABNORMAL
EOSINOPHIL # BLD: 0.1 K/UL (ref 0–0.4)
EOSINOPHIL NFR BLD: 1 % (ref 0–7)
ERYTHROCYTE [DISTWIDTH] IN BLOOD BY AUTOMATED COUNT: 14.9 % (ref 11.5–14.5)
HCT VFR BLD AUTO: 42.4 % (ref 35–47)
HGB BLD-MCNC: 12.9 G/DL (ref 11.5–16)
IMM GRANULOCYTES # BLD AUTO: 0 K/UL (ref 0–0.04)
IMM GRANULOCYTES NFR BLD AUTO: 1 % (ref 0–0.5)
INR PPP: 3.7 (ref 0.9–1.1)
LYMPHOCYTES # BLD: 1.9 K/UL (ref 0.8–3.5)
LYMPHOCYTES NFR BLD: 23 % (ref 12–49)
MCH RBC QN AUTO: 27.9 PG (ref 26–34)
MCHC RBC AUTO-ENTMCNC: 30.4 G/DL (ref 30–36.5)
MCV RBC AUTO: 91.8 FL (ref 80–99)
MONOCYTES # BLD: 0.7 K/UL (ref 0–1)
MONOCYTES NFR BLD: 8 % (ref 5–13)
NEUTS SEG # BLD: 5.5 K/UL (ref 1.8–8)
NEUTS SEG NFR BLD: 66 % (ref 32–75)
NRBC # BLD: 0 K/UL (ref 0–0.01)
NRBC BLD-RTO: 0 PER 100 WBC
PLATELET # BLD AUTO: 302 K/UL (ref 150–400)
PMV BLD AUTO: 9.9 FL (ref 8.9–12.9)
PROTHROMBIN TIME: 36.6 SEC (ref 9–11.1)
Q-T INTERVAL, ECG07: 418 MS
QRS DURATION, ECG06: 84 MS
QTC CALCULATION (BEZET), ECG08: 476 MS
RBC # BLD AUTO: 4.62 M/UL (ref 3.8–5.2)
THERAPEUTIC RANGE,PTTT: ABNORMAL SECS (ref 58–77)
VENTRICULAR RATE, ECG03: 78 BPM
WBC # BLD AUTO: 8.3 K/UL (ref 3.6–11)

## 2021-12-30 PROCEDURE — 85610 PROTHROMBIN TIME: CPT

## 2021-12-30 PROCEDURE — 85025 COMPLETE CBC W/AUTO DIFF WBC: CPT

## 2021-12-30 PROCEDURE — 93005 ELECTROCARDIOGRAM TRACING: CPT

## 2021-12-30 PROCEDURE — 85730 THROMBOPLASTIN TIME PARTIAL: CPT

## 2021-12-30 PROCEDURE — 36415 COLL VENOUS BLD VENIPUNCTURE: CPT

## 2021-12-30 RX ORDER — TRAMADOL HYDROCHLORIDE 50 MG/1
50 TABLET ORAL
COMMUNITY

## 2021-12-30 RX ORDER — FUROSEMIDE 40 MG/1
40 TABLET ORAL
COMMUNITY

## 2021-12-30 NOTE — PERIOP NOTES
[facility]  PREOPERATIVE INSTRUCTIONS    Surgery Date:   01/06/21    Surgery arrival time given by surgeon: NO     If no, [facility] staff will call you between 4 PM- 8 PM the day before surgery with your arrival time. If your surgery is on a Monday, we will call you the preceding Friday. Please call [phone] after 8 PM if you did not receive your arrival time. 1. Please report to [facility] Patient Access/Admitting on the 1st floor. Bring your insurance card, photo identification, and any copayment ( if applicable). 2. If you are going home the same day of your surgery, you must have a responsible adult to drive you home. You need to have a responsible adult to stay with you the first 24 hours after surgery and you should not drive a car for 24 hours following your surgery. 3. Do NOT eat any solid foods after midnight the night before surgery including candy, mint or gum. You may drink clear liquids from midnight until 1 hour prior to your arrival. You may drink up to 12 ounces at one time every 4 hours. Please note special instructions, if applicable, below for medications. 4. Do NOT drink alcohol or smoke 24 hours before surgery. STOP smoking for 14 days prior as it helps with breathing and healing after surgery. 5. If you are being admitted to the hospital, please leave personal belongings/luggage in your car until you have an assigned hospital room number. 6. Please wear comfortable clothes. Wear your glasses instead of contacts. We ask that all money, jewelry and valuables be left at home. Wear no make up, particularly mascara, the day of surgery. 7.  All body piercings, rings, and jewelry need to be removed and left at home. Please wear your hair loose or down. Please no pony-tails, buns, or any metal hair accessories. If you shower the morning of surgery, please do not apply any lotions or powders afterwards. You may wear deodorant, unless having breast surgery.   Do not shave any body area within 24 hours of your surgery. 8. Please follow all instructions to avoid any potential surgical cancellation. 9. Should your physical condition change, (i.e. fever, cold, flu, etc.) please notify your surgeon as soon as possible. 10. It is important to be on time. If a situation occurs where you may be delayed, please call:  [phone] / [phone] on the day of surgery. 11. The Preadmission Testing staff can be reached at [phone]. 12. Special instructions: COVID TEST ON 01/03/21 AT 1240      Current Outpatient Medications   Medication Sig    furosemide (LASIX) 40 mg tablet Take 40 mg by mouth nightly.  cyanocobalamin, vitamin B-12, (VITAMIN B-12 INJECTION) by Injection route every thirty (30) days. LAST DOSE WAS 12/19/21    traMADoL (ULTRAM) 50 mg tablet Take 50 mg by mouth every eight (8) hours as needed for Pain. 1-2 IF NEEDED    metoprolol succinate (TOPROL-XL) 25 mg XL tablet Take 25 mg by mouth daily.  potassium chloride SR (KLOR-CON 10) 10 mEq tablet Take 10 mEq by mouth two (2) times a day. (Patient not taking: Reported on 12/30/2021)    furosemide (LASIX) 80 mg tablet Take 80 mg by mouth daily.  acetaminophen (TYLENOL) 500 mg tablet Take 1,000 mg by mouth nightly. No current facility-administered medications for this encounter. 1. YOU MUST ONLY TAKE THESE MEDICATIONS THE MORNING OF SURGERY WITH A SIP OF WATER: METOPROLOL   2. MEDICATIONS TO TAKE THE MORNING OF SURGERY ONLY IF NEEDED: TYLENOL, TRAMADOL- LAST DOSE 4 HOURS  PRIOR TO ARRIVAL   3. HOLD these medications BEFORE Surgery:   4. Ask your surgeon/prescribing physician about when/if to STOP taking these medications:   5. Stop all vitamins, herbal medicines and Aspirin containing products 7 days prior to surgery. Stop any non-steroidal anti-inflammatory drugs (i.e. Ibuprofen, Naproxen, Advil, Aleve) 3 days before surgery. You may take Tylenol.     6. If you are currently taking Plavix, Coumadin,or any other blood-thinning/anticoagulant medication contact your prescribing physician for instructions. Eating and Drinking Before Surgery     You may eat a regular dinner at the usual time on the day before your surgery.  Do NOT eat any solid foods after midnight unless your arrival time at the hospital is 3pm or later.  You may drink clear liquids only from 12 midnight until 1 hours prior to your arrival time at the hospital on the day of your surgery. Do NOT drink alcohol.  Clear liquids include:  o Water  o Fruit juices without pulp( i.e. apple juice)  o Carbonated beverages  o Black coffee (no cream/milk)  o Tea (no cream/milk)  o Gatorade   You may drink up to 12-16 ounces at one time every 4 hours between the hours of midnight and 1 hour before your arrival time at the hospital. Example- if your arrival time at the hospital is 6am, you may drink 12-16 ounces of clear liquids no later than 5am.   If your arrival time at the hospital is 3pm or later, you may eat a light breakfast before 8am.   A light breakfast includes:  o Toast or bagel (no butter)  o Black coffee (no cream/milk)  o Tea (no cream/milk)  o Fruit juices without pulp ( i.e. apple juice)  o Do NOT eat meat, eggs, vegetables or fruit   If you have any questions, please contact your surgeon's office. Preventing Infections Before and After  Your Surgery    IMPORTANT INSTRUCTIONS    Please read and follow these instructions carefully. If you are unable to comply with the below instructions your procedure will be cancelled. You play an important role in your health and preparation for surgery. To reduce the germs on your skin you will need to shower with CHG soap (Chorhexidine gluconate 4%) two times before surgery. CHG soap (Hibiclens, Hex-A-Clens or store brand)   CHG soap will be provided at your Preadmission Testing (PAT) appointment.    If you do not have a PAT appointment before surgery, you may arrange to  CHG soap from our office or purchase CHG soap at a pharmacy, grocery or department store.  You need to purchase TWO 4 ounce bottles to use for your 2 showers. Steps to follow:  1. Wash your hair with your normal shampoo and your body with regular soap and rinse well to remove shampoo and soap from your skin. 2. Wet a clean washcloth and turn off the shower. 3. Put CHG soap on washcloth and apply to your entire body from the neck down. Do not use on your head, face or private parts(genitals). Do not use CHG soap on open sores, wounds or areas of skin irritation. 4. Wash you body gently for 5 minutes. Do not wash your skin too hard. This soap does not create lather. Pay special attention to your underarms and from your belly button to your feet. 5. Turn the shower back on and rinse well to get CHG soap off your body. 6. Pat your skin dry with a clean, dry towel. Do not apply lotions or moisturizer. 7. Put on clean clothes and sleep on fresh bed sheets and do not allow pets to sleep with you. Shower with CHG soap 2 times before your surgery   The evening before your surgery   The morning of your surgery      Tips to help prevent infections after your surgery:  1. Protect your surgical wound from germs:  ? Hand washing is the most important thing you and your caregivers can do to prevent infections. ? Keep your bandage clean and dry! ? Do not touch your surgical wound. 2. Use clean, freshly washed towels and washcloths every time you shower; do not share bath linens with others. 3. Until your surgical wound is healed, wear clothing and sleep on bed linens each day that are clean and freshly washed. 4. Do not allow pets to sleep in your bed with you or touch your surgical wound. 5. Do not smoke  smoking delays wound healing. This may be a good time to stop smoking.   6. If you have diabetes, it is important for you to manage your blood sugar levels properly before your surgery as well as after your surgery. Poorly managed blood sugar levels slow down wound healing and prevent you from healing completely. [employer]   Instructions for Pre-Surgery COVID-19 Testing     Across our ministry we have established standard guidelines to ensure the health and safety of our patients, residents and associates as we resume elective services for patients. All patients presenting for surgery are required to have a COVID-19 test result within 96 hours of their scheduled surgery. [address] [place] [address] is providing this test free of charge to the patient. Instructions for COVID-19 Testing:     Patients will receive a call from Pre-Admission Testing 4-5 days prior to surgery to schedule a date and time to come to the [address] [place] [address] Drive for their COVID-19 test   Patients are advised to self-quarantine after testing until their scheduled surgery   Once on site, patients will be registered and receive COVID test in their vehicle   If a patient is scheduled for normal Pre Admission Testing 96 hours from date of surgery, the patient will still have their COVID test done at the [address] [place] [address] located at [address] [place] [address] Positive results will be shared with the surgeon and anesthesiologist and may result in cancellation of the elective procedure    Testing Hours and Location:   Address:  [address] [place] [address] Up Pre Admission 11 [facility] in the Discharge Lot on [facility] (Map Attached)  174 [facility], [address]   Hours: Monday- Friday 7a-3p    PAT Phone Number: [phone]              Patient Information Regarding COVID Restrictions    Patients are advised to self-quarantine after COVID testing up to the day of the scheduled procedure. Day of Procedure     Please park in the parking deck or any designated visitor parking lot.    Enter the facility through the Main Entrance of the hospital.   A temperature check and appropriate symptom/exposure screening will be done prior to entry to the facility.  On the day of surgery, please provide the cell phone number of the person who will be waiting for you to the Patient Access representative at the time of registration.  Please wear a mask on the day of your procedure.  We are now allowing one designated visitor per stay. Pediatric patients may have 2 designated visitors. This one person may come in with you on the day of your procedure.  No visitors under the age of 13.  The designated visitor must also wear a mask.  Once your procedure and the immediate recovery period is completed, a nurse in the recovery area will contact your designated visitor to inform them of your room number or to otherwise review other pertinent information regarding your care.  Social distancing practices are to be adhered to in waiting areas and the cafeteria. The patient was contacted  in person. She verbalized understanding of all instructions does not  need reinforcement.

## 2021-12-30 NOTE — PERIOP NOTES
CALLED PCP FOR LOV, LABS AND CHEST XRAY RESULTS     COPY OF COVID CARD PLACED ON CHART     INSTRUCTIONS GIVEN AND REVIEWED WITH PATIENT AND DAUGHTER, GIVEN TIME TO ASK QUESTIONS, 2 BOTTLES OF SOAP GIVEN AND REVIEWED    EKG PERFORMED     CALLED [facility] FOR LOV NOTES AND RECENT TESTING, PT WAS LAST SEEN ON 08/2020    PT STATES STOPPED COUMADIN ON 12/29/21 PER MD ORDER DUE TO BLEEDING TIME WILL PUT IN FOR STAT PT/PTT DOS TO LOOK AT NUMBERS     PT LIVES AT [facility] IN THE INDEPENDENT LIVING PART, DAUGHTER TO BRING HER DOS     PATIENT   MADE AWARE OF COVID-19 TESTING NEEDED TO BE DONE WITHIN 96 HOURS OF SURGERY. COVID-19 TESTING APPOINTMENT MADE FOR PATIENT. PATIENT INSTRUCTED ON SELF QUARANTINE BETWEEN TESTING AND ARRIVAL TIME DAY OF SURGERY.     PT HAD UTI TODAY 12/30/2021 LAST DOSE OF ANTIBIOTIC

## 2022-01-05 DIAGNOSIS — C50.911 MALIGNANT NEOPLASM OF RIGHT FEMALE BREAST, UNSPECIFIED ESTROGEN RECEPTOR STATUS, UNSPECIFIED SITE OF BREAST (HCC): Primary | ICD-10-CM

## 2022-01-20 ENCOUNTER — APPOINTMENT (OUTPATIENT)
Dept: GENERAL RADIOLOGY | Age: 87
End: 2022-01-20
Attending: STUDENT IN AN ORGANIZED HEALTH CARE EDUCATION/TRAINING PROGRAM
Payer: MEDICARE

## 2022-01-20 ENCOUNTER — ANESTHESIA (OUTPATIENT)
Dept: MEDSURG UNIT | Age: 87
End: 2022-01-20
Payer: MEDICARE

## 2022-01-20 ENCOUNTER — ANESTHESIA EVENT (OUTPATIENT)
Dept: MEDSURG UNIT | Age: 87
End: 2022-01-20
Payer: MEDICARE

## 2022-01-20 ENCOUNTER — HOSPITAL ENCOUNTER (OUTPATIENT)
Age: 87
Setting detail: OBSERVATION
Discharge: NURSING, REHAB OR LONG-TERM CARE | End: 2022-01-21
Attending: SURGERY | Admitting: SURGERY
Payer: MEDICARE

## 2022-01-20 DIAGNOSIS — C50.911 BREAST CANCER, STAGE 4, RIGHT (HCC): ICD-10-CM

## 2022-01-20 DIAGNOSIS — C50.911 MALIGNANT NEOPLASM OF RIGHT FEMALE BREAST, UNSPECIFIED ESTROGEN RECEPTOR STATUS, UNSPECIFIED SITE OF BREAST (HCC): ICD-10-CM

## 2022-01-20 PROBLEM — Z98.890 STATUS POST SURGERY: Status: ACTIVE | Noted: 2022-01-20

## 2022-01-20 LAB — INR BLD: 1.1 (ref 0.9–1.2)

## 2022-01-20 PROCEDURE — 74011250636 HC RX REV CODE- 250/636: Performed by: NURSE ANESTHETIST, CERTIFIED REGISTERED

## 2022-01-20 PROCEDURE — 88309 TISSUE EXAM BY PATHOLOGIST: CPT

## 2022-01-20 PROCEDURE — 77030002933 HC SUT MCRYL J&J -A: Performed by: SURGERY

## 2022-01-20 PROCEDURE — 77030002996 HC SUT SLK J&J -A: Performed by: SURGERY

## 2022-01-20 PROCEDURE — 76030000004 HC AMB SURG OR TIME 2 TO 2.5: Performed by: SURGERY

## 2022-01-20 PROCEDURE — 74011000258 HC RX REV CODE- 258: Performed by: SURGERY

## 2022-01-20 PROCEDURE — 76210000034 HC AMBSU PH I REC 0.5 TO 1 HR: Performed by: SURGERY

## 2022-01-20 PROCEDURE — 77030010507 HC ADH SKN DERMBND J&J -B: Performed by: SURGERY

## 2022-01-20 PROCEDURE — 2709999900 HC NON-CHARGEABLE SUPPLY: Performed by: SURGERY

## 2022-01-20 PROCEDURE — 74011000250 HC RX REV CODE- 250: Performed by: NURSE ANESTHETIST, CERTIFIED REGISTERED

## 2022-01-20 PROCEDURE — 77030038692 HC WND DEB SYS IRMX -B: Performed by: SURGERY

## 2022-01-20 PROCEDURE — G0378 HOSPITAL OBSERVATION PER HR: HCPCS

## 2022-01-20 PROCEDURE — 77030011267 HC ELECTRD BLD COVD -A: Performed by: SURGERY

## 2022-01-20 PROCEDURE — 19307 MAST MOD RAD: CPT | Performed by: SURGERY

## 2022-01-20 PROCEDURE — 77030026438 HC STYL ET INTUB CARD -A: Performed by: NURSE ANESTHETIST, CERTIFIED REGISTERED

## 2022-01-20 PROCEDURE — 77030031139 HC SUT VCRL2 J&J -A: Performed by: SURGERY

## 2022-01-20 PROCEDURE — 77030008684 HC TU ET CUF COVD -B: Performed by: NURSE ANESTHETIST, CERTIFIED REGISTERED

## 2022-01-20 PROCEDURE — 85610 PROTHROMBIN TIME: CPT

## 2022-01-20 PROCEDURE — 77030041680 HC PNCL ELECSURG SMK EVAC CNMD -B: Performed by: SURGERY

## 2022-01-20 PROCEDURE — 74011250636 HC RX REV CODE- 250/636: Performed by: SURGERY

## 2022-01-20 PROCEDURE — 74011000250 HC RX REV CODE- 250: Performed by: SURGERY

## 2022-01-20 PROCEDURE — 74011250637 HC RX REV CODE- 250/637: Performed by: SURGERY

## 2022-01-20 PROCEDURE — 76060000064 HC AMB SURG ANES 2 TO 2.5 HR: Performed by: SURGERY

## 2022-01-20 PROCEDURE — C9290 INJ, BUPIVACAINE LIPOSOME: HCPCS | Performed by: SURGERY

## 2022-01-20 PROCEDURE — 77030040506 HC DRN WND MDII -A: Performed by: SURGERY

## 2022-01-20 PROCEDURE — 77030034626 HC LIGASURE SM JAW SEAL OPN SURG COVD -E: Performed by: SURGERY

## 2022-01-20 PROCEDURE — 74011000250 HC RX REV CODE- 250: Performed by: STUDENT IN AN ORGANIZED HEALTH CARE EDUCATION/TRAINING PROGRAM

## 2022-01-20 PROCEDURE — 71045 X-RAY EXAM CHEST 1 VIEW: CPT

## 2022-01-20 PROCEDURE — 77030008462 HC STPLR SKN PROX J&J -A: Performed by: SURGERY

## 2022-01-20 RX ORDER — DIPHENHYDRAMINE HYDROCHLORIDE 50 MG/ML
12.5 INJECTION, SOLUTION INTRAMUSCULAR; INTRAVENOUS
Status: DISCONTINUED | OUTPATIENT
Start: 2022-01-20 | End: 2022-01-21 | Stop reason: HOSPADM

## 2022-01-20 RX ORDER — FUROSEMIDE 10 MG/ML
INJECTION INTRAMUSCULAR; INTRAVENOUS AS NEEDED
Status: DISCONTINUED | OUTPATIENT
Start: 2022-01-20 | End: 2022-01-20 | Stop reason: HOSPADM

## 2022-01-20 RX ORDER — HYDROMORPHONE HYDROCHLORIDE 1 MG/ML
0.5 INJECTION, SOLUTION INTRAMUSCULAR; INTRAVENOUS; SUBCUTANEOUS
Status: DISCONTINUED | OUTPATIENT
Start: 2022-01-20 | End: 2022-01-21 | Stop reason: HOSPADM

## 2022-01-20 RX ORDER — SODIUM CHLORIDE 0.9 % (FLUSH) 0.9 %
5-40 SYRINGE (ML) INJECTION EVERY 8 HOURS
Status: DISCONTINUED | OUTPATIENT
Start: 2022-01-20 | End: 2022-01-21 | Stop reason: HOSPADM

## 2022-01-20 RX ORDER — ONDANSETRON 2 MG/ML
4 INJECTION INTRAMUSCULAR; INTRAVENOUS AS NEEDED
Status: DISCONTINUED | OUTPATIENT
Start: 2022-01-20 | End: 2022-01-21 | Stop reason: HOSPADM

## 2022-01-20 RX ORDER — SODIUM CHLORIDE, SODIUM LACTATE, POTASSIUM CHLORIDE, CALCIUM CHLORIDE 600; 310; 30; 20 MG/100ML; MG/100ML; MG/100ML; MG/100ML
INJECTION, SOLUTION INTRAVENOUS
Status: DISCONTINUED | OUTPATIENT
Start: 2022-01-20 | End: 2022-01-20 | Stop reason: HOSPADM

## 2022-01-20 RX ORDER — PROPOFOL 10 MG/ML
INJECTION, EMULSION INTRAVENOUS AS NEEDED
Status: DISCONTINUED | OUTPATIENT
Start: 2022-01-20 | End: 2022-01-20 | Stop reason: HOSPADM

## 2022-01-20 RX ORDER — ACETAMINOPHEN 325 MG/1
650 TABLET ORAL
Status: DISCONTINUED | OUTPATIENT
Start: 2022-01-20 | End: 2022-01-21 | Stop reason: HOSPADM

## 2022-01-20 RX ORDER — METOPROLOL SUCCINATE 25 MG/1
25 TABLET, EXTENDED RELEASE ORAL DAILY
Status: DISCONTINUED | OUTPATIENT
Start: 2022-01-21 | End: 2022-01-21 | Stop reason: HOSPADM

## 2022-01-20 RX ORDER — TRAMADOL HYDROCHLORIDE 50 MG/1
100 TABLET ORAL
Status: DISCONTINUED | OUTPATIENT
Start: 2022-01-20 | End: 2022-01-21 | Stop reason: HOSPADM

## 2022-01-20 RX ORDER — FENTANYL CITRATE 50 UG/ML
25 INJECTION, SOLUTION INTRAMUSCULAR; INTRAVENOUS
Status: DISCONTINUED | OUTPATIENT
Start: 2022-01-20 | End: 2022-01-21 | Stop reason: HOSPADM

## 2022-01-20 RX ORDER — NALOXONE HYDROCHLORIDE 0.4 MG/ML
0.4 INJECTION, SOLUTION INTRAMUSCULAR; INTRAVENOUS; SUBCUTANEOUS AS NEEDED
Status: DISCONTINUED | OUTPATIENT
Start: 2022-01-20 | End: 2022-01-21 | Stop reason: HOSPADM

## 2022-01-20 RX ORDER — WARFARIN SODIUM 5 MG/1
TABLET ORAL
COMMUNITY
Start: 2021-11-16

## 2022-01-20 RX ORDER — LIDOCAINE HYDROCHLORIDE 20 MG/ML
INJECTION, SOLUTION EPIDURAL; INFILTRATION; INTRACAUDAL; PERINEURAL AS NEEDED
Status: DISCONTINUED | OUTPATIENT
Start: 2022-01-20 | End: 2022-01-20 | Stop reason: HOSPADM

## 2022-01-20 RX ORDER — TRAMADOL HYDROCHLORIDE 50 MG/1
50 TABLET ORAL
Qty: 15 TABLET | Refills: 0 | Status: SHIPPED | OUTPATIENT
Start: 2022-01-20 | End: 2022-01-27

## 2022-01-20 RX ORDER — PHENYLEPHRINE HCL IN 0.9% NACL 0.4MG/10ML
SYRINGE (ML) INTRAVENOUS AS NEEDED
Status: DISCONTINUED | OUTPATIENT
Start: 2022-01-20 | End: 2022-01-20 | Stop reason: HOSPADM

## 2022-01-20 RX ORDER — PROPOFOL 10 MG/ML
INJECTION, EMULSION INTRAVENOUS
Status: DISCONTINUED | OUTPATIENT
Start: 2022-01-20 | End: 2022-01-20 | Stop reason: HOSPADM

## 2022-01-20 RX ORDER — SODIUM CHLORIDE 0.9 % (FLUSH) 0.9 %
5-40 SYRINGE (ML) INJECTION EVERY 8 HOURS
Status: CANCELLED | OUTPATIENT
Start: 2022-01-20

## 2022-01-20 RX ORDER — ROCURONIUM BROMIDE 10 MG/ML
INJECTION, SOLUTION INTRAVENOUS AS NEEDED
Status: DISCONTINUED | OUTPATIENT
Start: 2022-01-20 | End: 2022-01-20 | Stop reason: HOSPADM

## 2022-01-20 RX ORDER — SODIUM CHLORIDE 0.9 % (FLUSH) 0.9 %
5-40 SYRINGE (ML) INJECTION AS NEEDED
Status: DISCONTINUED | OUTPATIENT
Start: 2022-01-20 | End: 2022-01-21 | Stop reason: HOSPADM

## 2022-01-20 RX ORDER — SODIUM CHLORIDE, SODIUM LACTATE, POTASSIUM CHLORIDE, CALCIUM CHLORIDE 600; 310; 30; 20 MG/100ML; MG/100ML; MG/100ML; MG/100ML
50 INJECTION, SOLUTION INTRAVENOUS CONTINUOUS
Status: CANCELLED | OUTPATIENT
Start: 2022-01-20 | End: 2022-01-21

## 2022-01-20 RX ORDER — FENTANYL CITRATE 50 UG/ML
INJECTION, SOLUTION INTRAMUSCULAR; INTRAVENOUS AS NEEDED
Status: DISCONTINUED | OUTPATIENT
Start: 2022-01-20 | End: 2022-01-20 | Stop reason: HOSPADM

## 2022-01-20 RX ORDER — BUPIVACAINE HYDROCHLORIDE AND EPINEPHRINE 5; 5 MG/ML; UG/ML
30 INJECTION, SOLUTION EPIDURAL; INTRACAUDAL; PERINEURAL ONCE
Status: DISCONTINUED | OUTPATIENT
Start: 2022-01-20 | End: 2022-01-20 | Stop reason: HOSPADM

## 2022-01-20 RX ORDER — ONDANSETRON 2 MG/ML
4 INJECTION INTRAMUSCULAR; INTRAVENOUS
Status: DISCONTINUED | OUTPATIENT
Start: 2022-01-20 | End: 2022-01-21 | Stop reason: HOSPADM

## 2022-01-20 RX ORDER — ACETAMINOPHEN 325 MG/1
650 TABLET ORAL ONCE
Status: CANCELLED | OUTPATIENT
Start: 2022-01-20 | End: 2022-01-20

## 2022-01-20 RX ORDER — FUROSEMIDE 40 MG/1
80 TABLET ORAL DAILY
Status: DISCONTINUED | OUTPATIENT
Start: 2022-01-21 | End: 2022-01-21 | Stop reason: HOSPADM

## 2022-01-20 RX ORDER — POTASSIUM CHLORIDE 750 MG/1
10 TABLET, FILM COATED, EXTENDED RELEASE ORAL 2 TIMES DAILY
Status: DISCONTINUED | OUTPATIENT
Start: 2022-01-20 | End: 2022-01-21 | Stop reason: HOSPADM

## 2022-01-20 RX ORDER — TRAMADOL HYDROCHLORIDE 50 MG/1
50 TABLET ORAL
Status: DISCONTINUED | OUTPATIENT
Start: 2022-01-20 | End: 2022-01-21 | Stop reason: HOSPADM

## 2022-01-20 RX ORDER — PHENYLEPHRINE HCL IN 0.9% NACL 0.4MG/10ML
SYRINGE (ML) INTRAVENOUS
Status: DISCONTINUED | OUTPATIENT
Start: 2022-01-20 | End: 2022-01-20

## 2022-01-20 RX ORDER — ONDANSETRON 2 MG/ML
INJECTION INTRAMUSCULAR; INTRAVENOUS AS NEEDED
Status: DISCONTINUED | OUTPATIENT
Start: 2022-01-20 | End: 2022-01-20 | Stop reason: HOSPADM

## 2022-01-20 RX ORDER — CEFAZOLIN SODIUM 1 G/3ML
INJECTION, POWDER, FOR SOLUTION INTRAMUSCULAR; INTRAVENOUS AS NEEDED
Status: DISCONTINUED | OUTPATIENT
Start: 2022-01-20 | End: 2022-01-20

## 2022-01-20 RX ORDER — SODIUM CHLORIDE 0.9 % (FLUSH) 0.9 %
5-40 SYRINGE (ML) INJECTION AS NEEDED
Status: CANCELLED | OUTPATIENT
Start: 2022-01-20

## 2022-01-20 RX ADMIN — ROCURONIUM BROMIDE 40 MG: 10 SOLUTION INTRAVENOUS at 16:25

## 2022-01-20 RX ADMIN — SODIUM CHLORIDE, PRESERVATIVE FREE 10 ML: 5 INJECTION INTRAVENOUS at 23:10

## 2022-01-20 RX ADMIN — ONDANSETRON HYDROCHLORIDE 4 MG: 2 INJECTION, SOLUTION INTRAMUSCULAR; INTRAVENOUS at 16:40

## 2022-01-20 RX ADMIN — POTASSIUM CHLORIDE 10 MEQ: 750 TABLET, FILM COATED, EXTENDED RELEASE ORAL at 21:11

## 2022-01-20 RX ADMIN — PROPOFOL 75 MCG/KG/MIN: 10 INJECTION, EMULSION INTRAVENOUS at 16:24

## 2022-01-20 RX ADMIN — SODIUM CHLORIDE, PRESERVATIVE FREE 10 ML: 5 INJECTION INTRAVENOUS at 22:00

## 2022-01-20 RX ADMIN — SODIUM CHLORIDE, POTASSIUM CHLORIDE, SODIUM LACTATE AND CALCIUM CHLORIDE: 600; 310; 30; 20 INJECTION, SOLUTION INTRAVENOUS at 16:00

## 2022-01-20 RX ADMIN — PROPOFOL 60 MG: 10 INJECTION, EMULSION INTRAVENOUS at 16:24

## 2022-01-20 RX ADMIN — SUGAMMADEX 100 MG: 100 INJECTION, SOLUTION INTRAVENOUS at 18:20

## 2022-01-20 RX ADMIN — Medication 80 MCG: at 16:24

## 2022-01-20 RX ADMIN — WATER 2 G: 1 INJECTION INTRAMUSCULAR; INTRAVENOUS; SUBCUTANEOUS at 16:45

## 2022-01-20 RX ADMIN — FENTANYL CITRATE 25 MCG: 50 INJECTION, SOLUTION INTRAMUSCULAR; INTRAVENOUS at 16:50

## 2022-01-20 RX ADMIN — FUROSEMIDE 20 MG: 10 INJECTION, SOLUTION INTRAMUSCULAR; INTRAVENOUS at 18:07

## 2022-01-20 RX ADMIN — LIDOCAINE HYDROCHLORIDE 20 MG: 20 INJECTION, SOLUTION EPIDURAL; INFILTRATION; INTRACAUDAL; PERINEURAL at 16:24

## 2022-01-20 RX ADMIN — SODIUM CHLORIDE 20 MCG/MIN: 9 INJECTION, SOLUTION INTRAVENOUS at 16:24

## 2022-01-20 NOTE — DISCHARGE INSTRUCTIONS
Discharge Instructions from Dr. Birgit Downs    · I will call you with the pathology results, typically within 1 week from today. · You may shower, but no hot tubs, swimming pools, or baths until your incision is healed. · No heavy lifting with the affected extremity (nothing greater than 5 pounds), and limit its use for the next 4-5 days. · You may use an ice pack for comfort for the next couple of days, but do not place ice directly on the skin. Rather, use a towel or clothing to serve as a barrier between skin and ice to prevent injury. · If I placed a drain, follow the drain instructions provided, especially as you keep a record of the drain output. · Follow medication instructions carefully. · Watch for signs of infection as listed below. · Redness  · Swelling  · Drainage from the incision or from your nipple that appears infected  · Fever over 101 degrees for consecutive readings, or over 99.5 if you are currently undergoing chemotherapy. · Call our office (number is below) for a follow-up appointment. · If you have any problems, our phone number is [phone].

## 2022-01-20 NOTE — PROGRESS NOTES
Per MD Beverley Wolf CXR results are stable and we will proceed with scheduled surgery. Patient resting comfortably at this time. Family aware of start time delay.

## 2022-01-20 NOTE — ANESTHESIA PREPROCEDURE EVALUATION
Relevant Problems   NEUROLOGY   (+) Confusion      CARDIOVASCULAR   (+) A-fib (HCC)      RENAL FAILURE   (+) PRACHI (acute kidney injury) ([facility] [address]   Anesthetic History   No history of anesthetic complications            Review of Systems / Medical History  Patient summary reviewed, nursing notes reviewed and pertinent labs reviewed    Pulmonary                   Neuro/Psych   Within defined limits           Cardiovascular    Hypertension                   GI/Hepatic/Renal  Within defined limits              Endo/Other  Within defined limits           Other Findings   Comments: r femoral neck fx         Physical Exam    Airway  Mallampati: II  TM Distance: > 6 cm  Neck ROM: normal range of motion   Mouth opening: Normal     Cardiovascular    Rhythm: irregular           Dental    Dentition: Full upper dentures and Full lower dentures     Pulmonary  Breath sounds clear to auscultation               Abdominal  GI exam deferred       Other Findings            Anesthetic Plan    ASA: 3  Anesthesia type: general          Induction: Intravenous  Anesthetic plan and risks discussed with: Patient                Anesthetic History   No history of anesthetic complications            Review of Systems / Medical History  Patient summary reviewed, nursing notes reviewed and pertinent labs reviewed    Pulmonary    COPD               Neuro/Psych   Within defined limits           Cardiovascular    Hypertension      CHF  Dysrhythmias : atrial fibrillation        Comments: 1586: Normal systolic function, EF 42-65%, no significant valvular abnormalities   2021: In setting of sepsis/NSTEMI, moderately reduced systolic function EF 14-47%, calcified AV without stenosis, MAC    Extensive chart reviewed and no follow up TTE since 2021 inpatient stay.  I performed a limited bedside TTE to assess global cardiac function, which appears low normal.   GI/Hepatic/Renal     GERD           Endo/Other        Arthritis     Other Findings   Comments: Patient presented with normal SpO2 with intermittent hypoxia with exertion, which recovered with cessation of exertion and without need for supplemental O2. Patient reports that this is her baseline for years- this was also confirmed by her daughter and chart review of previous vital signs in EMR and beside chart. CXR was performed and historical imaging reviewed. Patient with known COPD and fibrotic lung disease. Mild interstitial edema was noted on today's CXR, as well as previous CXRs. Discussion with patient about urgency of procedure and pulmonary findings. Risks and benefits weighed and we have agreed to proceed.             Physical Exam    Airway  Mallampati: II  TM Distance: 4 - 6 cm  Neck ROM: normal range of motion   Mouth opening: Normal     Cardiovascular    Rhythm: irregular  Rate: normal    Murmur: Grade 2, Aortic area and Mitral area     Dental    Dentition: Edentulous, Full lower dentures and Full upper dentures     Pulmonary  Breath sounds clear to auscultation               Abdominal  GI exam deferred       Other Findings            Anesthetic Plan    ASA: 4  Anesthesia type: general and total IV anesthesia    Monitoring Plan: BIS      Induction: Intravenous  Anesthetic plan and risks discussed with: Patient

## 2022-01-20 NOTE — PERIOP NOTES
Patient's oxygen saturation has been varying between 86%-96% on RA. She has a hx of COPD and states her oxygen drops when she \"feels stressed out. \"  Her O2 dipped to 77% on RA during IV start. MD Johny Bob consulted and will order a CXR prior to sx start. Patient denies any SOB or chest pain that are out of her normal.  States she becomes very SOB while ambulating but usually resides once resting. Patient otherwise stable. Will continue to monitor and evaluate chest xray results before proceeding.

## 2022-01-20 NOTE — H&P
HISTORY OF PRESENT ILLNESS  Keo Boogie is a 80 y.o. female.     HPI  NEW patient consult referred by  Dr. Don You RIGHT breast mass. The area has been noticeable for about 5 years and it has started to come through the skin. She has also noticed an odor coming from the lesion.      Breast history-   Has has bilateral breast biopsies in 1970's for cysts.      Family History:  None     Breast imaging-   None             Past Medical History:   Diagnosis Date    Arthritis      Atrial fibrillation ([address] 2015     post hip surgery    Chronic obstructive pulmonary disease (HCC)      Gastrointestinal disorder       gallstones    Hypertension      Iron deficiency      Leg ulcer (HCC)      Macular degeneration      OA (osteoarthritis) of shoulder      Spinal stenosis       lumbar               Past Surgical History:   Procedure Laterality Date    HX HEENT         cataract surgery with lens implants    HX ORTHOPAEDIC         right hip surgery         Social History            Socioeconomic History    Marital status:        Spouse name: Not on file    Number of children: Not on file    Years of education: Not on file    Highest education level: Not on file   Occupational History    Not on file   Tobacco Use    Smoking status: Former Smoker       Years: 60.00       Quit date: 1991       Years since quittin.7    Smokeless tobacco: Never Used   Substance and Sexual Activity    Alcohol use: Yes    Drug use: Not on file    Sexual activity: Not on file   Other Topics Concern    Not on file   Social History Narrative    Not on file      Social Determinants of Health          Financial Resource Strain:     Difficulty of Paying Living Expenses: Not on file   Food Insecurity:     Worried About Running Out of Food in the Last Year: Not on file    Laura of Food in the Last Year: Not on file   Transportation Needs:     Lack of Transportation (Medical):  Not on file    Lack of Transportation (Non-Medical): Not on file   Physical Activity:     Days of Exercise per Week: Not on file    Minutes of Exercise per Session: Not on file   Stress:     Feeling of Stress : Not on file   Social Connections:     Frequency of Communication with Friends and Family: Not on file    Frequency of Social Gatherings with Friends and Family: Not on file    Attends Worship Services: Not on file    Active Member of [address] [place] [address] or Organizations: Not on file    Attends Club or Organization Meetings: Not on file    Marital Status: Not on file   Intimate Partner Violence:     Fear of Current or Ex-Partner: Not on file    Emotionally Abused: Not on file    Physically Abused: Not on file    Sexually Abused: Not on file   Housing Stability:     Unable to Pay for Housing in the Last Year: Not on file    Number of [place] in the Last Year: Not on file    Unstable Housing in the Last Year: Not on file                Current Outpatient Medications on File Prior to Visit   Medication Sig Dispense Refill    aspirin delayed-release 81 mg tablet Take 1 Tab by mouth daily. 30 Tab 2    cefTRIAXone 2 gram 2 g IVPB 2 g by IntraVENous route every twenty-four (24) hours. 12 Dose 0    metoprolol succinate (TOPROL-XL) 25 mg XL tablet Take 25 mg by mouth daily.        potassium chloride SR (KLOR-CON 10) 10 mEq tablet Take 10 mEq by mouth two (2) times a day.        furosemide (LASIX) 80 mg tablet Take 80 mg by mouth daily.        cholecalciferol (Vitamin D3) (5000 Units/125 mcg) tab tablet Take 5,000 Units by mouth daily.        warfarin (COUMADIN) 2.5 mg tablet Take 2.5 mg by mouth five (5) days a week.  2.5 mg every day, except no dose on Monday and Friday        acetaminophen (TYLENOL) 500 mg tablet Take 1,000 mg by mouth nightly.          No current facility-administered medications on file prior to visit.              Allergies   Allergen Reactions    Latex Itching         OB History    No obstetric history on file.       Obstetric Comments   Menarche 12, LMP 52, # of children 2, age of 4st delivery 25, Hysterectomy/oophorectomy No/No, Breast bx Yes, history of breast feeding No, BCP No, Hormone therapy No                ROS  Eyes: Positive for blurred vision. Respiratory: Positive for shortness of breath and wheezing.    Endo/Heme/Allergies: Bruises/bleeds easily.   All other systems reviewed and are negative.     Physical Exam  Exam conducted with a chaperone present. Cardiovascular:      Rate and Rhythm: Normal rate and regular rhythm. Heart sounds: Normal heart sounds. Pulmonary:      Breath sounds: Normal breath sounds. Chest:   Breasts: Breasts are symmetrical.      Right: Bleeding, mass and skin change present. No swelling, inverted nipple, nipple discharge, tenderness, axillary adenopathy or supraclavicular adenopathy. Left: Normal. No swelling, bleeding, inverted nipple, mass, nipple discharge, skin change, tenderness, axillary adenopathy or supraclavicular adenopathy.          Lymphadenopathy:      Cervical:      Right cervical: No superficial, deep or posterior cervical adenopathy. Left cervical: No superficial, deep or posterior cervical adenopathy. Upper Body:      Right upper body: No supraclavicular or axillary adenopathy. Left upper body: No supraclavicular or axillary adenopathy.          ASSESSMENT and PLAN      ICD-10-CM ICD-9-CM     1. Locally advanced carcinoma of breast, right ([facility] [address]  C50.911 174. 9        New patient presents for evaluation of RIGHT breast mass. , and is doing well overall. Pt presents will locally advanced breast cancer with skin metastases. Will schedule a palliative mastectomy. Mastectomy is an inpatient operation, with overnight stay in the hospital, and expected 4-6 week recovery time.

## 2022-01-20 NOTE — PERIOP NOTES
1640 - Large ulcers noted on lower extremities bilaterally with noted dark red edematous skin that extends from knee to foot bilaterally. 1645 - Verbal orders from Dr Keena Khan to discontinue SCD orders.

## 2022-01-21 VITALS
DIASTOLIC BLOOD PRESSURE: 65 MMHG | TEMPERATURE: 97.6 F | HEART RATE: 75 BPM | RESPIRATION RATE: 16 BRPM | WEIGHT: 150.35 LBS | HEIGHT: 65 IN | SYSTOLIC BLOOD PRESSURE: 108 MMHG | BODY MASS INDEX: 25.05 KG/M2 | OXYGEN SATURATION: 94 %

## 2022-01-21 PROCEDURE — 77010033678 HC OXYGEN DAILY

## 2022-01-21 PROCEDURE — 74011250637 HC RX REV CODE- 250/637: Performed by: SURGERY

## 2022-01-21 PROCEDURE — 51798 US URINE CAPACITY MEASURE: CPT

## 2022-01-21 PROCEDURE — 74011000250 HC RX REV CODE- 250: Performed by: SURGERY

## 2022-01-21 PROCEDURE — 94760 N-INVAS EAR/PLS OXIMETRY 1: CPT

## 2022-01-21 PROCEDURE — 74011000250 HC RX REV CODE- 250: Performed by: STUDENT IN AN ORGANIZED HEALTH CARE EDUCATION/TRAINING PROGRAM

## 2022-01-21 PROCEDURE — G0378 HOSPITAL OBSERVATION PER HR: HCPCS

## 2022-01-21 RX ADMIN — SODIUM CHLORIDE, PRESERVATIVE FREE 10 ML: 5 INJECTION INTRAVENOUS at 06:00

## 2022-01-21 RX ADMIN — METOPROLOL SUCCINATE 25 MG: 25 TABLET, EXTENDED RELEASE ORAL at 09:02

## 2022-01-21 RX ADMIN — FUROSEMIDE 80 MG: 40 TABLET ORAL at 09:02

## 2022-01-21 RX ADMIN — POTASSIUM CHLORIDE 10 MEQ: 750 TABLET, FILM COATED, EXTENDED RELEASE ORAL at 09:02

## 2022-01-21 NOTE — PROGRESS NOTES
T.O.C:   Pt expected to d/c to home   Transport at d/c TBD   Emergency Contact: Curly Cox, daughter, [phone]: CM to pt room to provide observation notice. Pt sleeping. CM will attempt to see pt later. 1045: CM received call from [address] at [facility]. Annel Haji requested pt status and for return records to be sent. CM advised pt is outpatient status and expected to d/c today. Referral to [facility] sent through [facility] pt return. 1133: CM received call from bedside RN asking if records were sent through [address], [address] [facility] verified they have been sent. RN stated pt has already been d/c'd and has left the building. CM unable to provide Observation paperwork.     Ignacio Alarcon, RN      Ignacio Alarcon, RN

## 2022-01-21 NOTE — PROGRESS NOTES
Doing great no complaints, tolerating diet  Had some urinary retention last night and has a mayer    AVSS    Flaps healthy, no hematoma    Plan DC today with mayer  Can remove in 2 or 3 days

## 2022-01-21 NOTE — PROGRESS NOTES
Bedside and Verbal shift change report given to BROOKE Briggs RN (oncoming nurse) by Moses West RN (offgoing nurse). Report included the following information SBAR, Kardex, Intake/Output, MAR, Recent Results and Med Rec Status.

## 2022-01-21 NOTE — PERIOP NOTES
TRANSFER - OUT REPORT:    Verbal report given to Shayla Ferrer RN (name) on Jossy Lorenzana  being transferred to [address] (unit) for routine progression of care       Report consisted of patients Situation, Background, Assessment and   Recommendations(SBAR). Information from the following report(s) SBAR, OR Summary, Procedure Summary, MAR, Recent Results and Cardiac Rhythm Afib was reviewed with the receiving nurse. Lines:   Peripheral IV 01/20/22 Distal;Left;Posterior Forearm (Active)   Site Assessment Clean, dry, & intact 01/20/22 1903   Phlebitis Assessment 0 01/20/22 1903   Infiltration Assessment 0 01/20/22 1903   Dressing Status Clean, dry, & intact 01/20/22 1903   Dressing Type Tape;Transparent 01/20/22 1903   Hub Color/Line Status Blue 01/20/22 1903       Peripheral IV 01/20/22 Left Hand (Active)   Site Assessment Clean, dry, & intact 01/20/22 1903   Phlebitis Assessment 0 01/20/22 1903   Infiltration Assessment 0 01/20/22 1903   Dressing Status Clean, dry, & intact 01/20/22 1903   Dressing Type Tape;Transparent 01/20/22 1903   Hub Color/Line Status Blue 01/20/22 1903        Opportunity for questions and clarification was provided.       Patient transported with:   Registered Nurse

## 2022-01-21 NOTE — ANESTHESIA POSTPROCEDURE EVALUATION
Procedure(s):  RIGHT BREAST MASTECTOMY. general, total IV anesthesia    Anesthesia Post Evaluation        Patient location during evaluation: PACU  Note status: Adequate. Level of consciousness: responsive to verbal stimuli and sleepy but conscious  Pain management: satisfactory to patient  Airway patency: patent  Anesthetic complications: no  Cardiovascular status: acceptable  Respiratory status: acceptable  Hydration status: acceptable  Comments: +Post-Anesthesia Evaluation and Assessment    Patient: Lauren Kang MRN: [id number]  SSN: [id number]   YOB: 1925  Age: 80 y.o. Sex: female          Cardiovascular Function/Vital Signs    BP (!) 140/44   Pulse 96   Temp 37 °C (98.6 °F)   Resp 28   Ht 5' 5\" (1.651 m)   Wt 68.2 kg (150 lb 5.7 oz)   SpO2 94%   BMI 25.02 kg/m²     Patient is status post Procedure(s):  RIGHT BREAST MASTECTOMY. Nausea/Vomiting: Controlled. Postoperative hydration reviewed and adequate. Pain:  Pain Scale 1: Numeric (0 - 10) (01/20/22 1835)  Pain Intensity 1: 0 (01/20/22 1835)   Managed. Neurological Status:   Neuro (WDL): Within Defined Limits (01/20/22 1835)   At baseline. Mental Status and Level of Consciousness: Arousable. Pulmonary Status:   O2 Device: Nasal cannula (01/20/22 1434)   Adequate oxygenation and airway patent. Complications related to anesthesia: None    Post-anesthesia assessment completed. No concerns. I have evaluated the patient and the patient is stable and ready to be discharged from PACU . Signed By: Stuart Weiss MD    1/20/2022        INITIAL Post-op Vital signs:   Vitals Value Taken Time   /44 01/20/22 1900   Temp 37 °C (98.6 °F) 01/20/22 1835   Pulse 108 01/20/22 1902   Resp 26 01/20/22 1902   SpO2 91 % 01/20/22 1902   Vitals shown include unvalidated device data.

## 2022-01-22 ENCOUNTER — APPOINTMENT (OUTPATIENT)
Dept: CT IMAGING | Age: 87
DRG: 871 | End: 2022-01-22
Attending: STUDENT IN AN ORGANIZED HEALTH CARE EDUCATION/TRAINING PROGRAM
Payer: MEDICARE

## 2022-01-22 ENCOUNTER — HOSPITAL ENCOUNTER (INPATIENT)
Age: 87
LOS: 3 days | Discharge: NURSING, REHAB OR LONG-TERM CARE | DRG: 871 | End: 2022-01-25
Attending: EMERGENCY MEDICINE | Admitting: STUDENT IN AN ORGANIZED HEALTH CARE EDUCATION/TRAINING PROGRAM
Payer: MEDICARE

## 2022-01-22 ENCOUNTER — APPOINTMENT (OUTPATIENT)
Dept: GENERAL RADIOLOGY | Age: 87
DRG: 871 | End: 2022-01-22
Attending: EMERGENCY MEDICINE
Payer: MEDICARE

## 2022-01-22 DIAGNOSIS — J18.9 PNEUMONIA DUE TO INFECTIOUS ORGANISM, UNSPECIFIED LATERALITY, UNSPECIFIED PART OF LUNG: Primary | ICD-10-CM

## 2022-01-22 PROBLEM — A41.9 SEPSIS (HCC): Status: ACTIVE | Noted: 2022-01-22

## 2022-01-22 PROBLEM — E87.20 LACTIC ACIDOSIS: Status: ACTIVE | Noted: 2022-01-22

## 2022-01-22 LAB
ALBUMIN SERPL-MCNC: 2.9 G/DL (ref 3.5–5)
ALBUMIN/GLOB SERPL: 0.7 {RATIO} (ref 1.1–2.2)
ALP SERPL-CCNC: 94 U/L (ref 45–117)
ALT SERPL-CCNC: 13 U/L (ref 12–78)
ANION GAP SERPL CALC-SCNC: 6 MMOL/L (ref 5–15)
APPEARANCE UR: ABNORMAL
AST SERPL-CCNC: 26 U/L (ref 15–37)
BACTERIA URNS QL MICRO: ABNORMAL /HPF
BASOPHILS # BLD: 0 K/UL (ref 0–0.1)
BASOPHILS NFR BLD: 0 % (ref 0–1)
BILIRUB SERPL-MCNC: 0.6 MG/DL (ref 0.2–1)
BILIRUB UR QL CFM: NEGATIVE
BNP SERPL-MCNC: ABNORMAL PG/ML
BUN SERPL-MCNC: 42 MG/DL (ref 6–20)
BUN/CREAT SERPL: 17 (ref 12–20)
CALCIUM SERPL-MCNC: 8.5 MG/DL (ref 8.5–10.1)
CHLORIDE SERPL-SCNC: 101 MMOL/L (ref 97–108)
CO2 SERPL-SCNC: 32 MMOL/L (ref 21–32)
COLOR UR: ABNORMAL
COVID-19 RAPID TEST, COVR: NOT DETECTED
CREAT SERPL-MCNC: 2.44 MG/DL (ref 0.55–1.02)
D DIMER PPP FEU-MCNC: 3.93 MG/L FEU (ref 0–0.65)
DIFFERENTIAL METHOD BLD: ABNORMAL
EOSINOPHIL # BLD: 0 K/UL (ref 0–0.4)
EOSINOPHIL NFR BLD: 0 % (ref 0–7)
EPITH CASTS URNS QL MICRO: ABNORMAL /LPF
ERYTHROCYTE [DISTWIDTH] IN BLOOD BY AUTOMATED COUNT: 15.9 % (ref 11.5–14.5)
GLOBULIN SER CALC-MCNC: 4.2 G/DL (ref 2–4)
GLUCOSE SERPL-MCNC: 162 MG/DL (ref 65–100)
GLUCOSE UR STRIP.AUTO-MCNC: NEGATIVE MG/DL
HCT VFR BLD AUTO: 40.7 % (ref 35–47)
HGB BLD-MCNC: 12.4 G/DL (ref 11.5–16)
HGB UR QL STRIP: ABNORMAL
IMM GRANULOCYTES # BLD AUTO: 0 K/UL (ref 0–0.04)
IMM GRANULOCYTES NFR BLD AUTO: 0 % (ref 0–0.5)
INR PPP: 1.3 (ref 0.9–1.1)
KETONES UR QL STRIP.AUTO: NEGATIVE MG/DL
LACTATE BLD-SCNC: 2.52 MMOL/L (ref 0.4–2)
LEUKOCYTE ESTERASE UR QL STRIP.AUTO: ABNORMAL
LYMPHOCYTES # BLD: 2.7 K/UL (ref 0.8–3.5)
LYMPHOCYTES NFR BLD: 13 % (ref 12–49)
MCH RBC QN AUTO: 28.1 PG (ref 26–34)
MCHC RBC AUTO-ENTMCNC: 30.5 G/DL (ref 30–36.5)
MCV RBC AUTO: 92.1 FL (ref 80–99)
MONOCYTES # BLD: 1.7 K/UL (ref 0–1)
MONOCYTES NFR BLD: 8 % (ref 5–13)
NEUTS SEG # BLD: 16.4 K/UL (ref 1.8–8)
NEUTS SEG NFR BLD: 79 % (ref 32–75)
NITRITE UR QL STRIP.AUTO: NEGATIVE
NRBC # BLD: 0 K/UL (ref 0–0.01)
NRBC BLD-RTO: 0 PER 100 WBC
PH UR STRIP: 5 [PH] (ref 5–8)
PLATELET # BLD AUTO: 262 K/UL (ref 150–400)
PMV BLD AUTO: 10.8 FL (ref 8.9–12.9)
POTASSIUM SERPL-SCNC: 4.9 MMOL/L (ref 3.5–5.1)
PROT SERPL-MCNC: 7.1 G/DL (ref 6.4–8.2)
PROT UR STRIP-MCNC: ABNORMAL MG/DL
PROTHROMBIN TIME: 13.5 SEC (ref 9–11.1)
RBC # BLD AUTO: 4.42 M/UL (ref 3.8–5.2)
RBC #/AREA URNS HPF: ABNORMAL /HPF (ref 0–5)
RBC MORPH BLD: ABNORMAL
SODIUM SERPL-SCNC: 139 MMOL/L (ref 136–145)
SOURCE, COVRS: NORMAL
SP GR UR REFRACTOMETRY: 1.02 (ref 1–1.03)
TROPONIN-HIGH SENSITIVITY: 50 NG/L (ref 0–51)
UA: UC IF INDICATED,UAUC: ABNORMAL
UROBILINOGEN UR QL STRIP.AUTO: 1 EU/DL (ref 0.2–1)
WBC # BLD AUTO: 20.8 K/UL (ref 3.6–11)
WBC URNS QL MICRO: ABNORMAL /HPF (ref 0–4)

## 2022-01-22 PROCEDURE — 36415 COLL VENOUS BLD VENIPUNCTURE: CPT

## 2022-01-22 PROCEDURE — 85610 PROTHROMBIN TIME: CPT

## 2022-01-22 PROCEDURE — 77010033678 HC OXYGEN DAILY

## 2022-01-22 PROCEDURE — 65660000000 HC RM CCU STEPDOWN

## 2022-01-22 PROCEDURE — 71045 X-RAY EXAM CHEST 1 VIEW: CPT

## 2022-01-22 PROCEDURE — 83880 ASSAY OF NATRIURETIC PEPTIDE: CPT

## 2022-01-22 PROCEDURE — 85025 COMPLETE CBC W/AUTO DIFF WBC: CPT

## 2022-01-22 PROCEDURE — 87040 BLOOD CULTURE FOR BACTERIA: CPT

## 2022-01-22 PROCEDURE — 83605 ASSAY OF LACTIC ACID: CPT

## 2022-01-22 PROCEDURE — 74011000250 HC RX REV CODE- 250: Performed by: STUDENT IN AN ORGANIZED HEALTH CARE EDUCATION/TRAINING PROGRAM

## 2022-01-22 PROCEDURE — 87086 URINE CULTURE/COLONY COUNT: CPT

## 2022-01-22 PROCEDURE — 74011000258 HC RX REV CODE- 258: Performed by: STUDENT IN AN ORGANIZED HEALTH CARE EDUCATION/TRAINING PROGRAM

## 2022-01-22 PROCEDURE — 94761 N-INVAS EAR/PLS OXIMETRY MLT: CPT

## 2022-01-22 PROCEDURE — 93005 ELECTROCARDIOGRAM TRACING: CPT

## 2022-01-22 PROCEDURE — 81001 URINALYSIS AUTO W/SCOPE: CPT

## 2022-01-22 PROCEDURE — 71250 CT THORAX DX C-: CPT

## 2022-01-22 PROCEDURE — 85379 FIBRIN DEGRADATION QUANT: CPT

## 2022-01-22 PROCEDURE — 87635 SARS-COV-2 COVID-19 AMP PRB: CPT

## 2022-01-22 PROCEDURE — 99285 EMERGENCY DEPT VISIT HI MDM: CPT

## 2022-01-22 PROCEDURE — 74011250636 HC RX REV CODE- 250/636: Performed by: STUDENT IN AN ORGANIZED HEALTH CARE EDUCATION/TRAINING PROGRAM

## 2022-01-22 PROCEDURE — 80053 COMPREHEN METABOLIC PANEL: CPT

## 2022-01-22 PROCEDURE — 84484 ASSAY OF TROPONIN QUANT: CPT

## 2022-01-22 RX ORDER — ACETAMINOPHEN 325 MG/1
650 TABLET ORAL
Status: DISCONTINUED | OUTPATIENT
Start: 2022-01-22 | End: 2022-01-22 | Stop reason: SDUPTHER

## 2022-01-22 RX ORDER — VANCOMYCIN 1.75 GRAM/500 ML IN 0.9 % SODIUM CHLORIDE INTRAVENOUS
1750
Status: DISPENSED | OUTPATIENT
Start: 2022-01-22 | End: 2022-01-23

## 2022-01-22 RX ORDER — WARFARIN SODIUM 5 MG/1
5 TABLET ORAL ONCE
Status: DISPENSED | OUTPATIENT
Start: 2022-01-22 | End: 2022-01-23

## 2022-01-22 RX ORDER — SODIUM CHLORIDE 0.9 % (FLUSH) 0.9 %
5-40 SYRINGE (ML) INJECTION AS NEEDED
Status: DISCONTINUED | OUTPATIENT
Start: 2022-01-22 | End: 2022-01-24 | Stop reason: SDUPTHER

## 2022-01-22 RX ORDER — SODIUM CHLORIDE 9 MG/ML
50 INJECTION, SOLUTION INTRAVENOUS ONCE
Status: COMPLETED | OUTPATIENT
Start: 2022-01-22 | End: 2022-01-22

## 2022-01-22 RX ORDER — SODIUM CHLORIDE 0.9 % (FLUSH) 0.9 %
5-40 SYRINGE (ML) INJECTION EVERY 8 HOURS
Status: DISCONTINUED | OUTPATIENT
Start: 2022-01-22 | End: 2022-01-24 | Stop reason: SDUPTHER

## 2022-01-22 RX ORDER — ONDANSETRON 2 MG/ML
4 INJECTION INTRAMUSCULAR; INTRAVENOUS
Status: DISCONTINUED | OUTPATIENT
Start: 2022-01-22 | End: 2022-01-24 | Stop reason: SDUPTHER

## 2022-01-22 RX ORDER — ACETAMINOPHEN 650 MG/1
650 SUPPOSITORY RECTAL
Status: DISCONTINUED | OUTPATIENT
Start: 2022-01-22 | End: 2022-01-24 | Stop reason: SDUPTHER

## 2022-01-22 RX ORDER — ONDANSETRON 4 MG/1
4 TABLET, ORALLY DISINTEGRATING ORAL
Status: DISCONTINUED | OUTPATIENT
Start: 2022-01-22 | End: 2022-01-24 | Stop reason: SDUPTHER

## 2022-01-22 RX ORDER — FUROSEMIDE 10 MG/ML
40 INJECTION INTRAMUSCULAR; INTRAVENOUS DAILY
Status: DISCONTINUED | OUTPATIENT
Start: 2022-01-23 | End: 2022-01-24

## 2022-01-22 RX ORDER — POLYETHYLENE GLYCOL 3350 17 G/17G
17 POWDER, FOR SOLUTION ORAL DAILY PRN
Status: DISCONTINUED | OUTPATIENT
Start: 2022-01-22 | End: 2022-01-25 | Stop reason: HOSPADM

## 2022-01-22 RX ORDER — ENOXAPARIN SODIUM 100 MG/ML
1 INJECTION SUBCUTANEOUS ONCE
Status: COMPLETED | OUTPATIENT
Start: 2022-01-22 | End: 2022-01-22

## 2022-01-22 RX ORDER — ACETAMINOPHEN 325 MG/1
650 TABLET ORAL
Status: DISCONTINUED | OUTPATIENT
Start: 2022-01-22 | End: 2022-01-24 | Stop reason: SDUPTHER

## 2022-01-22 RX ORDER — METOPROLOL SUCCINATE 25 MG/1
25 TABLET, EXTENDED RELEASE ORAL DAILY
Status: DISCONTINUED | OUTPATIENT
Start: 2022-01-23 | End: 2022-01-25 | Stop reason: SDUPTHER

## 2022-01-22 RX ORDER — TRAMADOL HYDROCHLORIDE 50 MG/1
50 TABLET ORAL
Status: DISCONTINUED | OUTPATIENT
Start: 2022-01-22 | End: 2022-01-25 | Stop reason: HOSPADM

## 2022-01-22 RX ORDER — FUROSEMIDE 40 MG/1
40 TABLET ORAL
Status: DISCONTINUED | OUTPATIENT
Start: 2022-01-22 | End: 2022-01-22

## 2022-01-22 RX ADMIN — SODIUM CHLORIDE, PRESERVATIVE FREE 10 ML: 5 INJECTION INTRAVENOUS at 23:05

## 2022-01-22 RX ADMIN — PIPERACILLIN AND TAZOBACTAM 3.38 G: 3; .375 INJECTION, POWDER, LYOPHILIZED, FOR SOLUTION INTRAVENOUS at 17:40

## 2022-01-22 RX ADMIN — SODIUM CHLORIDE 50 ML/HR: 9 INJECTION, SOLUTION INTRAVENOUS at 17:46

## 2022-01-22 RX ADMIN — ENOXAPARIN SODIUM 70 MG: 100 INJECTION SUBCUTANEOUS at 23:02

## 2022-01-22 RX ADMIN — METHYLPREDNISOLONE SODIUM SUCCINATE 40 MG: 40 INJECTION, POWDER, FOR SOLUTION INTRAMUSCULAR; INTRAVENOUS at 23:04

## 2022-01-22 RX ADMIN — CEFEPIME HYDROCHLORIDE 1 G: 1 INJECTION, POWDER, FOR SOLUTION INTRAMUSCULAR; INTRAVENOUS at 21:28

## 2022-01-22 NOTE — ED PROVIDER NOTES
EMERGENCY DEPARTMENT HISTORY AND PHYSICAL EXAM          Date: 1/22/2022  Patient Name: Sulema Kinsey  Attending of Record: Marj Sara     History of Presenting Illness     Chief Complaint   Patient presents with    Shortness of Breath     pt presents from [address] [place] [address] after R mastectomy on 1/20/22 - per EMS nursing staff found patient satting in the 70s and altered on room air, EMS arrived on scene and transported on non breather, 100% sat on non rebreather on arrival       History Provided By: Patient    HPI: Sulema Kinsey is a 80 y.o. female with a PMH of breast cancer, COPD, HTN, atrial fibrillation who presents via EMS for shortness of breath. She recently had a right mastectomy 2 days ago was discharged home yesterday. She is a resident of [facility]. This morning when the staff checked on her she was altered and found to have a sat of 70%. She was placed on non-re breather and mentation got better. She currently is on 4L NC. She states she feels good but the anesthesia from the surgery has her feeling weak. She denies cough or chest pain. PCP: Daphney Ayala MD    There are no other complaints, changes, or physical findings at this time. Current Outpatient Medications   Medication Sig Dispense Refill    warfarin (COUMADIN) 5 mg tablet       traMADoL (ULTRAM) 50 mg tablet Take 1 Tablet by mouth every six (6) hours as needed for Pain for up to 7 days. Max Daily Amount: 200 mg. 15 Tablet 0    furosemide (LASIX) 40 mg tablet Take 40 mg by mouth nightly.  cyanocobalamin, vitamin B-12, (VITAMIN B-12 INJECTION) by Injection route every thirty (30) days. LAST DOSE WAS 12/19/21      traMADoL (ULTRAM) 50 mg tablet Take 50 mg by mouth every eight (8) hours as needed for Pain. 1-2 IF NEEDED      metoprolol succinate (TOPROL-XL) 25 mg XL tablet Take 25 mg by mouth daily.  potassium chloride SR (KLOR-CON 10) 10 mEq tablet Take 10 mEq by mouth two (2) times a day.  furosemide (LASIX) 80 mg tablet Take 80 mg by mouth daily.  acetaminophen (TYLENOL) 500 mg tablet Take 1,000 mg by mouth nightly. Past History     Past Medical History:  Past Medical History:   Diagnosis Date    Arthritis     Atrial fibrillation ([address] 2015    post hip surgery    Chronic obstructive pulmonary disease (HCC)     Gastrointestinal disorder     gallstones    GERD (gastroesophageal reflux disease)     Hypertension     Iron deficiency     Leg ulcer (HCC)     Macular degeneration     OA (osteoarthritis) of shoulder     Spinal stenosis     lumbar    Urinary tract infection 22    BEING TREATED NOW LAST DOSE TODAY 21       Past Surgical History:  Past Surgical History:   Procedure Laterality Date    HX HEENT Bilateral     cataract surgery with lens implants    HX MASTECTOMY Right 2022    RIGHT BREAST MASTECTOMY performed by Mihai Chung MD at [address] HX ORTHOPAEDIC      right hip surgery    HX OTHER SURGICAL  2021    WOUNDS ON BREAST DUE TO BREAST CANCER, WOUND ON A LEG     HX TONSIL AND ADENOIDECTOMY      25 YRS OLD     CT BREAST SURGERY PROCEDURE UNLISTED  'S     CYST IN BOTH BREAST REMOVED        Family History:  Family History   Problem Relation Age of Onset    Cancer Mother         jaw;    Amtty Lasso Stroke Mother     Other Father         rheumatic fever    Anesth Problems Neg Hx        Social History:  Social History     Tobacco Use    Smoking status: Former Smoker     Years: 60.00     Quit date: 1991     Years since quittin.8    Smokeless tobacco: Never Used   Vaping Use    Vaping Use: Never used   Substance Use Topics    Alcohol use: Yes     Comment: RARELY     Drug use: Not Currently       Allergies:   Allergies   Allergen Reactions    Latex Itching    Digoxin Other (comments)     STATES SAW BLINKING LIGHTS, FEELING FUZZY          Review of Systems   Review of Systems   Constitutional: Negative for chills and fever. HENT: Negative for sore throat. Respiratory: Positive for shortness of breath. Negative for cough. Cardiovascular: Negative for chest pain. Gastrointestinal: Negative for abdominal pain, nausea and vomiting. Genitourinary: Negative for dysuria, frequency and urgency. Musculoskeletal: Negative for back pain. Skin: Negative for rash. Neurological: Negative for headaches. Psychiatric/Behavioral: Positive for confusion. Physical Exam   Physical Exam  Constitutional:       General: She is not in acute distress. HENT:      Head: Normocephalic and atraumatic. Mouth/Throat:      Mouth: Mucous membranes are moist.   Eyes:      Pupils: Pupils are equal, round, and reactive to light. Cardiovascular:      Rate and Rhythm: Normal rate and regular rhythm. Pulses: Normal pulses. Pulmonary:      Effort: Pulmonary effort is normal. Tachypnea present. Breath sounds: Normal breath sounds. Chest:      Comments: Healing right chest wall incision  Abdominal:      General: Abdomen is flat. Bowel sounds are normal.      Palpations: Abdomen is soft. Musculoskeletal:         General: Normal range of motion. Cervical back: Normal range of motion. Skin:     General: Skin is warm and dry. Comments: Bilateral lower extremity 1+ pitting edema and skin changes likely from venous stasis   Neurological:      General: No focal deficit present. Mental Status: She is alert. Psychiatric:         Mood and Affect: Mood normal.         Diagnostic Study Results     Labs -   No results found for this or any previous visit (from the past 12 hour(s)). Radiologic Studies -   XR CHEST PORT   Final Result   Prominent interstitial markings and low lung volumes.  Findings could represent   edema or viral pneumonia        CT Results  (Last 48 hours)    None        CXR Results  (Last 48 hours)               01/22/22 1430  XR CHEST PORT Final result    Impression:  Prominent interstitial markings and low lung volumes. Findings could represent   edema or viral pneumonia       Narrative:  EXAM:  XR CHEST PORT       INDICATION:  Shortness of breath       COMPARISON: 1/20/2020 exam       TECHNIQUE: AP portable upright chest view       FINDINGS: Low lung volumes. Prominence of the interstitial markings suggestive   of edema. No pleural effusion or pneumothorax. Heart size and mediastinal   contours are within normal limits. Osseous structures are age-appropriate                   Medical Decision Making   I am the first provider for this patient. I reviewed the vital signs, available nursing notes, past medical history, past surgical history, family history and social history. Vital Signs-Reviewed the patient's vital signs. Patient Vitals for the past 12 hrs:   Temp Pulse Resp BP SpO2   01/22/22 1421     96 %   01/22/22 1350 98.6 °F (37 °C) 84 (!) 32 (!) 124/59 95 %       ECG Interpretation: regular rate, atrial fibrillation, left axis deviation, left bundle branch block    Records Reviewed: Nursing Notes and Old Medical Records    Provider Notes (Medical Decision Making):   Pt presents from nursing facility with hypoxia after recent pallitaive right mastectomy per note on 1/20/22. Pt is on 4L NC sating well. Lung sounds are clear. Pt has no complaints but I believe patient is likely not taking in deep breaths after surgery and may have developed pneumonia. Aspiration is also on differential. PE also likely with advanced breast cancer. Will get labs, CXR, low threshold for CTA chest.      ED [facility] ED SEPSIS NOTE:     4:13 PM The patient now meets criteria for: Severe Sepsis    1. Fluid resuscitation with: Based on patient's history and elevated BNP will not give 30cc/kg fluid bolus at this time  2.  Due to concern for rapidly advancing infection and deterioration of patient's condition, antibiotics are started STAT and cultures ordered. ---------------------------------------------------------------------------------          ED Course and Progress Notes:   Initial assessment performed. The patients presenting problems have been discussed, and they are in agreement with the care plan formulated and outlined with them. I have encouraged them to ask questions as they arise throughout their visit. ED Course as of 01/22/22 1610   Sat Jan 22, 2022   1405 Residents patient seen by me. Patient with a history of COPD, status post mastectomy 2 days ago presenting for hypoxia. Patient coming from [facility] and was found to have saturations in the 70s so placed on oxygen. Here she was desaturating on room air so placed on 4 L nasal cannula. She is complaining of mild shortness of breath but denies any chest pain abdominal pain cough. Mastectomy wound healing well. Plan will be to get shortness of breath labs, if chest x-ray comes back negative for any acute disease will consider CTA. [JS]   5785 CTA changed to CT w/o contrast of chest due to low GFR [ND]      ED Course User Index  [JS] Yuko Le MD  [ND] Kera Nassar MD             Diagnosis     Clinical Impression: No diagnosis found.     Disposition:  Admitted to the hospital         Resident Signature: Bell Redd MD

## 2022-01-22 NOTE — H&P
Hospitalist Admission Note    NAME: Edward Chen   :  1925   MRN:  [id number]     Date/Time:  2022 5:34 PM    Patient PCP: Manan Mercedes MD  ______________________________________________________________________  Given the patient's current clinical presentation, I have a high level of concern for decompensation if discharged from the emergency department. Complex decision making was performed, which includes reviewing the patient's available past medical records, laboratory results, and x-ray films. My assessment of this patient's clinical condition and my plan of care is as follows. Assessment / Plan:  Addendum:   INR 1.3, will give a dose of therapeutic lovenox with coumadin  D dimer elevated, will do V/Q scan    Acute hypoxic respiratory failure  Likely secondary to Atelectasis + fluid overload:  COPD exacerbation  SIRS (Leukocytosis + tachycardia) with no obvious source of infection  Lactic acidosis    CT chest:  1. Postoperative changes of recent right mastectomy, with no evidence of fluid  collection or other unexpected postoperative finding. 2. Conglomerate right supraclavicular lymphadenopathy, and multiple enlarged  right axillary lymph nodes, compatible with metastatic disease. 3. Multiple small pulmonary nodules, suspicious for metastatic disease. 4. Small right and trace left pleural effusions with overlying bilateral lower  lobe subsegmental atelectasis. 5. 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 6. Cholelithiasis. BNP 94329  CTA couldn't be done given her PRACHI, PE is a concern given her post op status, coumadin was resumed after surgery, f/u INR (not sent yet), and D dimer. Will be on [facility] anyway for Afib  Will cover with emperic abx for now, f/u UA. Has mayer cath placed 2 days back during surgery.   Right breast wound site looks clean, CT negative for any fluid collection  Has mild fluid overload, c/w lasix  Discussed with Daughter at bedside, she is DNR/DNI, had mastectomy done mainly for palliative purpose, continue with doing medical treatment for now, will consider hospice if gets worse  Failed dysphagia screening, SLP keo, NPO for now  Has decreased air entry bilateral, will give iv steroids for COPD exacerbation    Afib:  F/u INR, and coumadin dosing per pharmacy    PRACHI:  Likely cardiorenal, monitor with diuretics    HTN:  Resume home meds    Code Status:DNR/DNI  Surrogate Decision Maker: her daughter    DVT Prophylaxis: Coumadin  GI Prophylaxis: not indicated    Baseline: Was able to move around with her electric wheel chair until few weeks back, from [address] [place]:   CHIEF COMPLAINT: Hypoxia    HISTORY OF PRESENT ILLNESS:     Kimberly Hardy is a 80 y.o. female with past medical history of right breast cancer, A. fib on Coumadin, COPD, hypertension was brought in from [facility] for hypoxia. She had a palliative right breast mastectomy done 2 days back at [address] and was sent to [facility]. She was found by nurse to be more confused today and was hypoxic, needing nonrebreather and was sent to ED. On my evaluation she is on 6 L oxygen. She has some mild pain at the right breast incision site. No cough, fever, belly pain, change in bladder or bowel habits. She has no    We were asked to admit for work up and evaluation of the above problems.      Past Medical History:   Diagnosis Date    Arthritis     Atrial fibrillation ([address] 12/2015    post hip surgery    Chronic obstructive pulmonary disease (HCC)     Gastrointestinal disorder     gallstones    GERD (gastroesophageal reflux disease)     Hypertension     Iron deficiency     Leg ulcer ([address]     Macular degeneration     OA (osteoarthritis) of shoulder     Spinal stenosis     lumbar    Urinary tract infection 12/23/22    BEING TREATED NOW LAST DOSE TODAY 12/30/21 Past Surgical History:   Procedure Laterality Date    HX HEENT Bilateral     cataract surgery with lens implants    HX MASTECTOMY Right 2022    RIGHT BREAST MASTECTOMY performed by Faith Mckenna MD at [address] HX ORTHOPAEDIC  2015    right hip surgery    HX OTHER SURGICAL  2021    WOUNDS ON BREAST DUE TO BREAST CANCER, WOUND ON A LEG     HX TONSIL AND ADENOIDECTOMY      18 YRS OLD     NH BREAST SURGERY PROCEDURE UNLISTED  'S     CYST IN BOTH BREAST REMOVED        Social History     Tobacco Use    Smoking status: Former Smoker     Years: 60.00     Quit date: 1991     Years since quittin.8    Smokeless tobacco: Never Used   Substance Use Topics    Alcohol use: Yes     Comment: RARELY         Family History   Problem Relation Age of Onset    Cancer Mother         jaw;    [facility] Stroke Mother     Other Father         rheumatic fever    Anesth Problems Neg Hx      Allergies   Allergen Reactions    Latex Itching    Digoxin Other (comments)     STATES SAW BLINKING LIGHTS, FEELING FUZZY         Prior to Admission medications    Medication Sig Start Date End Date Taking? Authorizing Provider   warfarin (COUMADIN) 5 mg tablet  21   Provider, Historical   traMADoL (ULTRAM) 50 mg tablet Take 1 Tablet by mouth every six (6) hours as needed for Pain for up to 7 days. Max Daily Amount: 200 mg. 87  Nitesh Jimenez MD   furosemide (LASIX) 40 mg tablet Take 40 mg by mouth nightly. Provider, Historical   cyanocobalamin, vitamin B-12, (VITAMIN B-12 INJECTION) by Injection route every thirty (30) days. LAST DOSE WAS 21    Provider, Historical   traMADoL (ULTRAM) 50 mg tablet Take 50 mg by mouth every eight (8) hours as needed for Pain. 1-2 IF NEEDED    Provider, Historical   metoprolol succinate (TOPROL-XL) 25 mg XL tablet Take 25 mg by mouth daily.     Provider, Historical   potassium chloride SR (KLOR-CON 10) 10 mEq tablet Take 10 mEq by mouth two (2) times a day. Provider, Historical   furosemide (LASIX) 80 mg tablet Take 80 mg by mouth daily. Provider, Historical   acetaminophen (TYLENOL) 500 mg tablet Take 1,000 mg by mouth nightly. Other, MD Demetrius       REVIEW OF SYSTEMS:     I am not able to complete the review of systems because:    The patient is intubated and sedated   x The patient has altered mental status due to his acute medical problems    The patient has baseline aphasia from prior stroke(s)    The patient has baseline dementia and is not reliable historian    The patient is in acute medical distress and unable to provide information           Total of 12 systems reviewed as follows:       POSITIVE= underlined text  Negative = text not underlined  General:  fever, chills, sweats, generalized weakness, weight loss/gain,      loss of appetite   Eyes:    blurred vision, eye pain, loss of vision, double vision  ENT:    rhinorrhea, pharyngitis   Respiratory:   cough, sputum production, SOB, ARREOLA, wheezing, pleuritic pain   Cardiology:   chest pain, palpitations, orthopnea, PND, edema, syncope   Gastrointestinal:  abdominal pain , N/V, diarrhea, dysphagia, constipation, bleeding   Genitourinary:  frequency, urgency, dysuria, hematuria, incontinence   Muskuloskeletal :  arthralgia, myalgia, back pain  Hematology:  easy bruising, nose or gum bleeding, lymphadenopathy   Dermatological: rash, ulceration, pruritis, color change / jaundice  Endocrine:   hot flashes or polydipsia   Neurological:  headache, dizziness, confusion, focal weakness, paresthesia,     Speech difficulties, memory loss, gait difficulty  Psychological: Feelings of anxiety, depression, agitation    Objective:   VITALS:    Visit Vitals  BP (!) 121/51   Pulse 82   Temp 98.6 °F (37 °C)   Resp (!) 33   Ht 5' 5\" (1.651 m)   Wt 74.9 kg (165 lb 2 oz)   SpO2 93%   BMI 27.48 kg/m²       PHYSICAL EXAM:    General:    Sleepy but arousable  HEENT: Atraumatic, anicteric sclerae, pink conjunctivae     No oral ulcers, mucosa moist, throat clear, dentition fair  Neck:  Supple, symmetrical,  thyroid: non tender  Lungs:   Clear to auscultation bilaterally. No Wheezing or Rhonchi. No rales. Chest wall:  No tenderness  No Accessory muscle use. Heart:   Regular  rhythm,  No  murmur   No edema  Abdomen:   Soft, non-tender. Not distended. Bowel sounds normal  Extremities: No cyanosis. No clubbing,      Skin turgor normal, Capillary refill normal, Radial dial pulse 2+  Skin:     Not pale. Not Jaundiced  No rashes   Psych:  Not anxious or agitated. Neurologic AO x 1, non focal    _______________________________________________________________________  Care Plan discussed with:    Comments   Patient y    Family  y    RN y    Care Manager                    Consultant:      _______________________________________________________________________  Expected  Disposition:   Home with Family    HH/PT/OT/RN    SNF/LTC y   [de-identified]    ________________________________________________________________________  TOTAL TIME:  28 Minutes    Critical Care Provided     Minutes non procedure based      Comments     Reviewed previous records   >50% of visit spent in counseling and coordination of care  Discussion with patient and/or family and questions answered       ________________________________________________________________________  Signed: Adele May MD    Procedures: see electronic medical records for all procedures/Xrays and details which were not copied into this note but were reviewed prior to creation of Plan.     LAB DATA REVIEWED:    Recent Results (from the past 24 hour(s))   EKG, 12 LEAD, INITIAL    Collection Time: 01/22/22  2:12 PM   Result Value Ref Range    Ventricular Rate 85 BPM    Atrial Rate 50 BPM    QRS Duration 130 ms    Q-T Interval 412 ms    QTC Calculation (Bezet) 490 ms    Calculated R Axis -116 degrees    Calculated T Axis 90 degrees    Diagnosis       ** Suspect arm lead reversal, interpretation assumes no reversal  Atrial fibrillation  Nonspecific intraventricular block  Cannot rule out Septal infarct , age undetermined  Lateral infarct , age undetermined  Inferior infarct , age undetermined  When compared with ECG of 30-DEC-2021 10:57,  Questionable change in QRS duration  Minimal criteria for Septal infarct are now present  Lateral infarct is now present     CBC WITH AUTOMATED DIFF    Collection Time: 01/22/22  2:49 PM   Result Value Ref Range    WBC 20.8 (H) 3.6 - 11.0 K/uL    RBC 4.42 3.80 - 5.20 M/uL    HGB 12.4 11.5 - 16.0 g/dL    HCT 40.7 35.0 - 47.0 %    MCV 92.1 80.0 - 99.0 FL    MCH 28.1 26.0 - 34.0 PG    MCHC 30.5 30.0 - 36.5 g/dL    RDW 15.9 (H) 11.5 - 14.5 %    PLATELET 276 529 - 197 K/uL    MPV 10.8 8.9 - 12.9 FL    NRBC 0.0 0  WBC    ABSOLUTE NRBC 0.00 0.00 - 0.01 K/uL    NEUTROPHILS 79 (H) 32 - 75 %    LYMPHOCYTES 13 12 - 49 %    MONOCYTES 8 5 - 13 %    EOSINOPHILS 0 0 - 7 %    BASOPHILS 0 0 - 1 %    IMMATURE GRANULOCYTES 0 0.0 - 0.5 %    ABS. NEUTROPHILS 16.4 (H) 1.8 - 8.0 K/UL    ABS. LYMPHOCYTES 2.7 0.8 - 3.5 K/UL    ABS. MONOCYTES 1.7 (H) 0.0 - 1.0 K/UL    ABS. EOSINOPHILS 0.0 0.0 - 0.4 K/UL    ABS. BASOPHILS 0.0 0.0 - 0.1 K/UL    ABS. IMM. GRANS. 0.0 0.00 - 0.04 K/UL    DF MANUAL      RBC COMMENTS NORMOCYTIC, NORMOCHROMIC     METABOLIC PANEL, COMPREHENSIVE    Collection Time: 01/22/22  2:49 PM   Result Value Ref Range    Sodium 139 136 - 145 mmol/L    Potassium 4.9 3.5 - 5.1 mmol/L    Chloride 101 97 - 108 mmol/L    CO2 32 21 - 32 mmol/L    Anion gap 6 5 - 15 mmol/L    Glucose 162 (H) 65 - 100 mg/dL    BUN 42 (H) 6 - 20 MG/DL    Creatinine 2.44 (H) 0.55 - 1.02 MG/DL    BUN/Creatinine ratio 17 12 - 20      GFR est AA 22 (L) >60 ml/min/1.73m2    GFR est non-AA 18 (L) >60 ml/min/1.73m2    Calcium 8.5 8.5 - 10.1 MG/DL    Bilirubin, total 0.6 0.2 - 1.0 MG/DL    ALT (SGPT) 13 12 - 78 U/L    AST (SGOT) 26 15 - 37 U/L    Alk.  phosphatase 94 45 - 117 U/L Protein, total 7.1 6.4 - 8.2 g/dL    Albumin 2.9 (L) 3.5 - 5.0 g/dL    Globulin 4.2 (H) 2.0 - 4.0 g/dL    A-G Ratio 0.7 (L) 1.1 - 2.2     NT-PRO BNP    Collection Time: 01/22/22  2:49 PM   Result Value Ref Range    NT pro-BNP 11,592 (H) <450 PG/ML   TROPONIN-HIGH SENSITIVITY    Collection Time: 01/22/22  2:49 PM   Result Value Ref Range    Troponin-High Sensitivity 50 0 - 51 ng/L   COVID-19 RAPID TEST    Collection Time: 01/22/22  2:49 PM   Result Value Ref Range    Specimen source Nasopharyngeal      COVID-19 rapid test Not detected NOTD     POC LACTIC ACID    Collection Time: 01/22/22  4:02 PM   Result Value Ref Range    Lactic Acid (POC) 2.52 (HH) 0.40 - 2.00 mmol/L

## 2022-01-22 NOTE — OP NOTES
[address]   OPERATIVE REPORT    Name:  Sarah Kevin  MR#:  [id number]  :  1925  ACCOUNT #:  [de-identified]  DATE OF SERVICE:  2022    PREOPERATIVE DIAGNOSIS:  Locally advanced carcinoma of the right breast.    POSTOPERATIVE DIAGNOSIS:  Locally advanced carcinoma of the right breast.    PROCEDURE PERFORMED:  Right modified radical mastectomy. SURGEON:  Bhakti Raymond MD    ASSISTANT:  Cherry Hercules. ANESTHESIA:  General.    COMPLICATIONS:  None. SPECIMENS REMOVED:  Right breast and right axillary lymph nodes. IMPLANTS:  None. ESTIMATED BLOOD LOSS:  minimal.    INDICATIONS:  The patient is a 35-year-old female who has a locally advanced right breast cancer that has been chronically bleeding and causing the patient significant quality of life issues. She is admitted for palliative mastectomy. PROCEDURE:  After satisfactory induction of general endotracheal anesthesia, the patient was prepped and draped in sterile fashion. A wide elliptical incision was made around the fungating mass and all palpable tumor and skin spread, which started from the lower inner quadrant of the left breast all the way towards the axilla. Skin flaps were raised superiorly to the clavicle, medial to the sternum, inferior to the inframammary fold, and lateral to the latissimus dorsi muscle. The breast and tumor were removed off the chest wall subfascially maintaining hemostasis with the Bovie cautery as specimen. The axilla was then entered. There was marked amount of disease in the axilla and multiple lymph nodes were removed with the specimen. Multiple nodes were removed; however, there were still remaining adenopathy in the axilla, which was fixed to the lateral chest wall and probably to the upper portion of the axilla and axillary vein.   The decision was made not to resect these as the goal of treatment was to palliate the patient to stop the bleeding and there was no way that these nodes could safely be removed and the plan would be to treat them medically depending on final pathology. All dissection planes were hemostatic with the Bovie cautery. Skin flaps were raised all the way up to the clavicle and down to the upper abdominal wall in order to gain enough length for closure of the middle portion of the incision. The wound was anesthetized with a solution of Marcaine, Exparel, and saline and two #19 [place] drains were brought out through a separate stab wound and secured with 2-0 silk. Incision was then closed with inner layer of 2-0 Vicryl sutures and a running subcuticular 4-0 Monocryl on the skin. The patient tolerated the procedure well with no complications. She was taken to the recovery room in stable condition. Patsy Valadez MD JP/NATANAEL_SALLY_I/  D:  01/21/2022 9:35  T:  01/21/2022 20:38  JOB #:  [id number]  CC:   MD Penny York MD

## 2022-01-23 LAB
ANION GAP SERPL CALC-SCNC: 6 MMOL/L (ref 5–15)
ATRIAL RATE: 50 BPM
BACTERIA SPEC CULT: NORMAL
BUN SERPL-MCNC: 51 MG/DL (ref 6–20)
BUN/CREAT SERPL: 21 (ref 12–20)
CALCIUM SERPL-MCNC: 8.7 MG/DL (ref 8.5–10.1)
CALCULATED R AXIS, ECG10: -116 DEGREES
CALCULATED T AXIS, ECG11: 90 DEGREES
CHLORIDE SERPL-SCNC: 104 MMOL/L (ref 97–108)
CO2 SERPL-SCNC: 30 MMOL/L (ref 21–32)
CREAT SERPL-MCNC: 2.39 MG/DL (ref 0.55–1.02)
DIAGNOSIS, 93000: NORMAL
ERYTHROCYTE [DISTWIDTH] IN BLOOD BY AUTOMATED COUNT: 15.9 % (ref 11.5–14.5)
GLUCOSE SERPL-MCNC: 141 MG/DL (ref 65–100)
HCT VFR BLD AUTO: 41.2 % (ref 35–47)
HGB BLD-MCNC: 12.1 G/DL (ref 11.5–16)
INR PPP: 1.4 (ref 0.9–1.1)
MCH RBC QN AUTO: 27.6 PG (ref 26–34)
MCHC RBC AUTO-ENTMCNC: 29.4 G/DL (ref 30–36.5)
MCV RBC AUTO: 93.8 FL (ref 80–99)
NRBC # BLD: 0 K/UL (ref 0–0.01)
NRBC BLD-RTO: 0 PER 100 WBC
PLATELET # BLD AUTO: 267 K/UL (ref 150–400)
PMV BLD AUTO: 10.5 FL (ref 8.9–12.9)
POTASSIUM SERPL-SCNC: 4.9 MMOL/L (ref 3.5–5.1)
PROTHROMBIN TIME: 14.6 SEC (ref 9–11.1)
Q-T INTERVAL, ECG07: 412 MS
QRS DURATION, ECG06: 130 MS
QTC CALCULATION (BEZET), ECG08: 490 MS
RBC # BLD AUTO: 4.39 M/UL (ref 3.8–5.2)
SERVICE CMNT-IMP: NORMAL
SODIUM SERPL-SCNC: 140 MMOL/L (ref 136–145)
VENTRICULAR RATE, ECG03: 85 BPM
WBC # BLD AUTO: 21 K/UL (ref 3.6–11)

## 2022-01-23 PROCEDURE — 74011000258 HC RX REV CODE- 258: Performed by: STUDENT IN AN ORGANIZED HEALTH CARE EDUCATION/TRAINING PROGRAM

## 2022-01-23 PROCEDURE — 80048 BASIC METABOLIC PNL TOTAL CA: CPT

## 2022-01-23 PROCEDURE — 74011250636 HC RX REV CODE- 250/636: Performed by: STUDENT IN AN ORGANIZED HEALTH CARE EDUCATION/TRAINING PROGRAM

## 2022-01-23 PROCEDURE — 94760 N-INVAS EAR/PLS OXIMETRY 1: CPT

## 2022-01-23 PROCEDURE — 77010033678 HC OXYGEN DAILY

## 2022-01-23 PROCEDURE — 74011000250 HC RX REV CODE- 250: Performed by: STUDENT IN AN ORGANIZED HEALTH CARE EDUCATION/TRAINING PROGRAM

## 2022-01-23 PROCEDURE — 65660000000 HC RM CCU STEPDOWN

## 2022-01-23 PROCEDURE — 74011250637 HC RX REV CODE- 250/637: Performed by: INTERNAL MEDICINE

## 2022-01-23 PROCEDURE — 85610 PROTHROMBIN TIME: CPT

## 2022-01-23 PROCEDURE — 36415 COLL VENOUS BLD VENIPUNCTURE: CPT

## 2022-01-23 PROCEDURE — 85027 COMPLETE CBC AUTOMATED: CPT

## 2022-01-23 RX ORDER — WARFARIN SODIUM 5 MG/1
5 TABLET ORAL ONCE
Status: COMPLETED | OUTPATIENT
Start: 2022-01-23 | End: 2022-01-23

## 2022-01-23 RX ADMIN — METHYLPREDNISOLONE SODIUM SUCCINATE 40 MG: 40 INJECTION, POWDER, FOR SOLUTION INTRAMUSCULAR; INTRAVENOUS at 21:27

## 2022-01-23 RX ADMIN — SODIUM CHLORIDE, PRESERVATIVE FREE 10 ML: 5 INJECTION INTRAVENOUS at 16:01

## 2022-01-23 RX ADMIN — CEFEPIME HYDROCHLORIDE 1 G: 1 INJECTION, POWDER, FOR SOLUTION INTRAMUSCULAR; INTRAVENOUS at 21:27

## 2022-01-23 RX ADMIN — WARFARIN SODIUM 5 MG: 5 TABLET ORAL at 18:10

## 2022-01-23 RX ADMIN — FUROSEMIDE 40 MG: 10 INJECTION, SOLUTION INTRAMUSCULAR; INTRAVENOUS at 11:26

## 2022-01-23 RX ADMIN — SODIUM CHLORIDE, PRESERVATIVE FREE 10 ML: 5 INJECTION INTRAVENOUS at 21:27

## 2022-01-23 RX ADMIN — METHYLPREDNISOLONE SODIUM SUCCINATE 40 MG: 40 INJECTION, POWDER, FOR SOLUTION INTRAMUSCULAR; INTRAVENOUS at 16:01

## 2022-01-23 RX ADMIN — SODIUM CHLORIDE, PRESERVATIVE FREE 10 ML: 5 INJECTION INTRAVENOUS at 06:05

## 2022-01-23 RX ADMIN — METHYLPREDNISOLONE SODIUM SUCCINATE 40 MG: 40 INJECTION, POWDER, FOR SOLUTION INTRAMUSCULAR; INTRAVENOUS at 06:02

## 2022-01-23 NOTE — PROGRESS NOTES
Pharmacist Daily Dosing of Warfarin    Indication & Goal INR: AFib, INR Goal 2-3    PTA Warfarin Dose: 5 mg daily    Notable concurrent conditions and medications: None    Labs:  Recent Labs     01/22/22  1855 01/22/22  1449 01/20/22  1159   INR 1.3*  --  1.1   HGB  --  12.4  --    PLT  --  262  --    TBILI  --  0.6  --    ALB  --  2.9*  --          Impression/Plan:   Will order warfarin 5 mg PO x 1 dose. Daily INR has been ordered  CBC w/o differential every other day has been ordered     Pharmacy will follow daily and adjust the dose as appropriate.     Thank you,  Bertin Devi, [facility]      Warfarin Protocol    Located on pharmacy Teams site: Clinical Practice -> Anticoagulation & Cardiology -> Anticoagulation Policies, Protocols, Guidance

## 2022-01-23 NOTE — PROGRESS NOTES
Hospitalist Progress Note    NAME: Josef Morales   :  1925   MRN:  [id number]       Assessment / Plan:    Acute hypoxic respiratory failure  Likely secondary to Atelectasis + fluid overload:  COPD exacerbation  SIRS (Leukocytosis + tachycardia) with no obvious source of infection  Lactic acidosis  Continue IV Solu-Medrol  Continue broad-spectrum antibiotic  Follow-up VQ scan    Dysphagia   -speech therapy   -start patient on Dysphagia diet        Afib:  F/u INR, and coumadin dosing per pharmacy     PRACHI:  Likely cardiorenal, monitor with diuretics     HTN:  Resume home meds    25.0 - 29.9 Overweight / Body mass index is 27.48 kg/m². Estimated discharge date:   Barriers:    Code status: DNR  Prophylaxis: Coumadin  Recommended Disposition: Home w/Family     Subjective:     Chief Complaint / Reason for Physician Visit  \"\". Discussed with RN events overnight. Patient seen and examined more alert , started on diet     Review of Systems:  Symptom Y/N Comments  Symptom Y/N Comments   Fever/Chills n   Chest Pain     Poor Appetite    Edema     Cough    Abdominal Pain     Sputum    Joint Pain     SOB/ARREOLA y   Pruritis/Rash     Nausea/vomit    Tolerating PT/OT     Diarrhea    Tolerating Diet y    Constipation    Other       Could NOT obtain due to:      Objective:     VITALS:   Last 24hrs VS reviewed since prior progress note.  Most recent are:  Patient Vitals for the past 24 hrs:   Temp Pulse Resp BP SpO2   22 0733 98.4 °F (36.9 °C) 88 22 (!) 94/49 92 %   22 0437 97 °F (36.1 °C) 99 22 (!) 119/43 94 %   22 0006 97.3 °F (36.3 °C) 84 25 (!) 104/58 94 %   22 2140 99.2 °F (37.3 °C) 80 23 110/66 97 %   22 98.2 °F (36.8 °C) 77 25 110/73    22 1844  80 26 (!) 137/53 95 %   22 1743  83 30 (!) 139/52 99 %   22 1643  82 27 (!) 140/56 90 %   22 1544  82 (!) 33 (!) 121/51 93 %   22 1529  99 26 (!) 127/53    22 1429  88 24 (!) 120/54 95 %   01/22/22 1421     96 %   01/22/22 1414  85 28 (!) 114/53 96 %   01/22/22 1359  88 30 (!) 126/45 90 %   01/22/22 1350 98.6 °F (37 °C) 84 (!) 32 (!) 124/59 95 %       Intake/Output Summary (Last 24 hours) at 1/23/2022 0830  Last data filed at 1/23/2022 3707  Gross per 24 hour   Intake 100 ml   Output 200 ml   Net -100 ml        I had a face to face encounter and independently examined this patient on 1/23/2022, as outlined below:  PHYSICAL EXAM:  General: WD, WN. Alert, cooperative, no acute distress    EENT:  EOMI. Anicteric sclerae. MMM  Resp:  CTA bilaterally, no wheezing or rales. No accessory muscle use  CV:  Regular  rhythm,  No edema  GI:  Soft, Non distended, Non tender. +Bowel sounds  Neurologic:  Alert and oriented X 3, normal speech,   Psych:   Good insight. Not anxious nor agitated  Skin:  No rashes. No jaundice    Reviewed most current lab test results and cultures  YES  Reviewed most current radiology test results   YES  Review and summation of old records today    NO  Reviewed patient's current orders and MAR    YES  PMH/SH reviewed - no change compared to H&P  ________________________________________________________________________  Care Plan discussed with:    Comments   Patient y    Family      RN y    Care Manager     Consultant                        Multidiciplinary team rounds were held today with , nursing, pharmacist and clinical coordinator. Patient's plan of care was discussed; medications were reviewed and discharge planning was addressed.      ________________________________________________________________________  Total NON critical care TIME:  35   Minutes    Total CRITICAL CARE TIME Spent:   Minutes non procedure based      Comments   >50% of visit spent in counseling and coordination of care     ________________________________________________________________________  Leroy Fulton MD     Procedures: see electronic medical records for all procedures/Xrays and details which were not copied into this note but were reviewed prior to creation of Plan. LABS:  I reviewed today's most current labs and imaging studies. Pertinent labs include:  Recent Labs     01/23/22 0353 01/22/22  1449   WBC 21.0* 20.8*   HGB 12.1 12.4   HCT 41.2 40.7    262     Recent Labs     01/23/22  0353 01/22/22  1855 01/22/22  1449 01/20/22  1159     --  139  --    K 4.9  --  4.9  --      --  101  --    CO2 30  --  32  --    *  --  162*  --    BUN 51*  --  42*  --    CREA 2.39*  --  2.44*  --    CA 8.7  --  8.5  --    ALB  --   --  2.9*  --    TBILI  --   --  0.6  --    ALT  --   --  13  --    INR 1.4* 1.3*  --  1.1       Signed:  Akanksha Lea MD

## 2022-01-23 NOTE — PROGRESS NOTES
Transition of Care Plan:    RUR: 16%  Disposition: Sepsis; Lactic Acidosis; PRACHI  Follow up appointments: PCP  DME needed: EVELYN Erwin  Transportation at Discharge: Likely [address] [place] [address] or means to access home: N/A       IM Medicare Letter: Needed at d/c  Is patient a BCPI-A Bundle: N/A        If yes, was Bundle Letter given?: N/A   Is patient a [place] and connected with the [place]? N/A  If yes, was [employer] transfer form completed and VA notified? N/A  Caregiver Contact: Danay Peterson [facility] ([phone]); Meenu Lara DTR ([phone])  Discharge Caregiver contacted prior to discharge? Yes             Reason for Admission:   Sepsis; Lactic Acidosis; PRACHI                  RUR Score: 16%                 PCP: First and Last name:   Violet Rubi MD     Name of Practice:    Are you a current patient: Yes/No: Yes - however being seen by provider on the [facility] side of [facility] last 3 weeks. Approximate date of last visit: 3+ weeks ago   Can you participate in a virtual visit if needed: No    Do you (patient/family) have any concerns for transition/discharge? No significant concerns expressed at this time. Family is hopeful that pt will be able to return to [address] [place] [address] at discharge, but is open to further discussion with Palliative Care and/or Hospice Care if recommended. Plan for utilizing home health: N/A - no PT/OT consults at this time. Likely will return to LTC at [address] at minimum. Current Advanced Directive/Advance Care Plan:  DNR - No ACP on file at this time. DTR reports she believes pt has previously filled out an ACP before, will check for copy at home. If available, will bring to hospital to scan on chart.  with 2 children. Danay Peterson [facility] ([phone]); Meenu Lara DTR ([phone])    Healthcare Decision Maker:  Danay Peterson [facility] ([phone]);  Meenu Lara DTR ([phone])  Click here to complete [facility] Decision Makers including selection of the Healthcare Decision Maker Relationship (ie \"Primary\")    Transition of Care Plan:        - Return to [address] HC/LTC pending progress   *Possible Hospice pending progress  - Palliative Care Consult  - 2nd IM Letter  - BLS transport at d/c    79 YO White Female admitted on 1/22/22 for Sepsis, Lactic Acidosis, PRACHI.  with 2 daughters that live in [address]. Up until 3 weeks ago, pt had been living in [address] [place] [address] at [address]. Hx of breast cancer with R mastectomy on 1/20/22 at [facility]). Pt discharged back to [facility] on 1/21/22, but much weaker requiring 3-person assist to transfer into DTR's car for transport. Family reports pt is \"very sharp in the brain\" but increasing mobility and physical deficits due to age. Denies hx of HH, SNF, or IPR. Was going to clinic for wound care at [address] while in independent living. Has w/c scooter (\"[employer]\") and RW. No prior home oxygen use. [facility] through [address]. Has Medicare A&B and BCBS. CM spoke with pt's DTR (Beverley Adams, [phone]) to complete assessment, verify demographic info. SLP consulted due to failed dysphagia screening and currently NPO wearing 5LPM continuous O2. DTR reported pt has very poor speech since admission, but also has poor vision (hx of cataract surgery that has worsened). Palliative Care Team consulted. Current d/c plan is for pt to return to [address] [place] [address] when medically stable, however DTR verbalized understanding that depending on pt's level of recovery that palliative care and/or hospice may be needed. CM explained that [address] may have a specific hospice agency they may have a contract with. CM will continue to remain available to assist with d/c planning    Care Management Interventions  PCP Verified by CM:  Yes  Palliative Care Criteria Met (RRAT>21 & CHF Dx)?: No (Palliative Care consulted 1/23)  Mode of Transport at Discharge: BLS  Transition of Care Consult (CM Consult): Discharge Planning  Discharge Durable Medical Equipment: No (RW, Scooter at home)  Health Maintenance Reviewed: Yes  Physical Therapy Consult: No  Occupational Therapy Consult: No  Speech Therapy Consult: Yes  Support Systems: [address] [facility],Child(cliff)  Confirm Follow Up Transport: Family  The Plan for Transition of Care is Related to the Following Treatment Goals : Return to [address] vs. Return to [facility] with Hospice?   The Patient and/or Patient Representative was Provided with a Choice of Provider and Agrees with the Discharge Plan?: Yes  Name of the Patient Representative Who was Provided with a Choice of Provider and Agrees with the Discharge Plan: Natalie Sierra (DTR)  Discharge Location  Patient Expects to be Discharged to[de-identified] [facility], [address] Manager  [phone]

## 2022-01-23 NOTE — PROGRESS NOTES
End of Shift Note    Bedside shift change report given to Kingsley Atkins (oncoming nurse) by Matthew Marrero (offgoing nurse). Report included the following information SBAR, Kardex, ED Summary, Procedure Summary, Intake/Output, MAR, Recent Results and Cardiac Rhythm atrial fibrillation    Shift worked:  7p-7a     Shift summary and any significant changes:     patient rested fairly this night sleeping at short intervals. She had episodes of awakening from sleep and feeling SOB, it could be because she mouth breathes when asleep, O2 5L in use, humidity added to O2. Patient having difficulty coughing up phlemn, poor cough effort. Concerns for physician to address:  warfarin held due to patient being NPO, lovonox injection given as directed. Need to retest swallow evaluation            Activity:  Activity Level: Up with Assistance  Number times ambulated in hallways past shift: 0  Number of times OOB to chair past shift: 0    Cardiac:   Cardiac Monitoring: Yes      Cardiac Rhythm: Atrial Fib    Access:   Current line(s): PIV     Genitourinary:   Urinary status: mayer    Respiratory:   O2 Device: Nasal cannula  Chronic home O2 use?: NO  Incentive spirometer at bedside: NO     GI:     Current diet:  DIET NPO  Tolerating current diet: YES       Pain Management:   Patient states pain is manageable on current regimen: N/A    Skin:  Neel Score: 16  Interventions: float heels, increase time out of bed and internal/external urinary devices    Patient Safety:  Fall Score:  Total Score: 4  Interventions: bed/chair alarm and gripper socks  High Fall Risk: Yes    Length of Stay:  Expected LOS: - - -  Actual LOS: [address] RAY

## 2022-01-23 NOTE — PROGRESS NOTES
Pharmacist Daily Dosing of Warfarin    Indication & Goal INR: AFib, INR Goal 2-3    PTA Warfarin Dose: 5 mg daily    Notable concurrent conditions and medications: None    Labs:  Recent Labs     01/23/22  0353 01/22/22  1855 01/22/22  1449 01/22/22  1449 01/20/22  1159   INR 1.4* 1.3*  --   --  1.1   HGB 12.1  --    < > 12.4  --      --   --  262  --    TBILI  --   --   --  0.6  --    ALB  --   --   --  2.9*  --     < > = values in this interval not displayed. Impression/Plan:   Will order warfarin 5 mg PO x 1 dose. Daily INR has been ordered  CBC w/o differential every other day has been ordered     Pharmacy will follow daily and adjust the dose as appropriate.     Thank you,  JESS NievesD      Warfarin Protocol    Located on pharmacy Teams site: Clinical Practice -> Anticoagulation & Cardiology -> Anticoagulation Policies, Protocols, Guidance

## 2022-01-24 ENCOUNTER — APPOINTMENT (OUTPATIENT)
Dept: GENERAL RADIOLOGY | Age: 87
DRG: 871 | End: 2022-01-24
Attending: INTERNAL MEDICINE
Payer: MEDICARE

## 2022-01-24 ENCOUNTER — APPOINTMENT (OUTPATIENT)
Dept: NUCLEAR MEDICINE | Age: 87
DRG: 871 | End: 2022-01-24
Attending: STUDENT IN AN ORGANIZED HEALTH CARE EDUCATION/TRAINING PROGRAM
Payer: MEDICARE

## 2022-01-24 ENCOUNTER — HOSPITAL ENCOUNTER (OUTPATIENT)
Dept: ULTRASOUND IMAGING | Age: 87
Discharge: HOME | DRG: 871 | End: 2022-01-24
Attending: INTERNAL MEDICINE
Payer: MEDICARE

## 2022-01-24 LAB
ANION GAP SERPL CALC-SCNC: 7 MMOL/L (ref 5–15)
BASOPHILS # BLD: 0 K/UL (ref 0–0.1)
BASOPHILS NFR BLD: 0 % (ref 0–1)
BUN SERPL-MCNC: 75 MG/DL (ref 6–20)
BUN/CREAT SERPL: 31 (ref 12–20)
CALCIUM SERPL-MCNC: 9.1 MG/DL (ref 8.5–10.1)
CHLORIDE SERPL-SCNC: 103 MMOL/L (ref 97–108)
CO2 SERPL-SCNC: 28 MMOL/L (ref 21–32)
CREAT SERPL-MCNC: 2.4 MG/DL (ref 0.55–1.02)
DIFFERENTIAL METHOD BLD: ABNORMAL
EOSINOPHIL # BLD: 0 K/UL (ref 0–0.4)
EOSINOPHIL NFR BLD: 0 % (ref 0–7)
ERYTHROCYTE [DISTWIDTH] IN BLOOD BY AUTOMATED COUNT: 15.7 % (ref 11.5–14.5)
GLUCOSE SERPL-MCNC: 191 MG/DL (ref 65–100)
HCT VFR BLD AUTO: 39.2 % (ref 35–47)
HGB BLD-MCNC: 12.1 G/DL (ref 11.5–16)
IMM GRANULOCYTES # BLD AUTO: 0.2 K/UL (ref 0–0.04)
IMM GRANULOCYTES NFR BLD AUTO: 1 % (ref 0–0.5)
INR PPP: 1.9 (ref 0.9–1.1)
LYMPHOCYTES # BLD: 1.1 K/UL (ref 0.8–3.5)
LYMPHOCYTES NFR BLD: 7 % (ref 12–49)
MCH RBC QN AUTO: 28.1 PG (ref 26–34)
MCHC RBC AUTO-ENTMCNC: 30.9 G/DL (ref 30–36.5)
MCV RBC AUTO: 91.2 FL (ref 80–99)
MONOCYTES # BLD: 0.8 K/UL (ref 0–1)
MONOCYTES NFR BLD: 5 % (ref 5–13)
NEUTS SEG # BLD: 13.4 K/UL (ref 1.8–8)
NEUTS SEG NFR BLD: 87 % (ref 32–75)
NRBC # BLD: 0.02 K/UL (ref 0–0.01)
NRBC BLD-RTO: 0.1 PER 100 WBC
PLATELET # BLD AUTO: 260 K/UL (ref 150–400)
PMV BLD AUTO: 10.5 FL (ref 8.9–12.9)
POTASSIUM SERPL-SCNC: 4.8 MMOL/L (ref 3.5–5.1)
PROTHROMBIN TIME: 19.6 SEC (ref 9–11.1)
RBC # BLD AUTO: 4.3 M/UL (ref 3.8–5.2)
SODIUM SERPL-SCNC: 138 MMOL/L (ref 136–145)
WBC # BLD AUTO: 15.5 K/UL (ref 3.6–11)

## 2022-01-24 PROCEDURE — 74011000258 HC RX REV CODE- 258: Performed by: STUDENT IN AN ORGANIZED HEALTH CARE EDUCATION/TRAINING PROGRAM

## 2022-01-24 PROCEDURE — 85025 COMPLETE CBC W/AUTO DIFF WBC: CPT

## 2022-01-24 PROCEDURE — 85610 PROTHROMBIN TIME: CPT

## 2022-01-24 PROCEDURE — 74011000250 HC RX REV CODE- 250: Performed by: INTERNAL MEDICINE

## 2022-01-24 PROCEDURE — 36415 COLL VENOUS BLD VENIPUNCTURE: CPT

## 2022-01-24 PROCEDURE — 74011000250 HC RX REV CODE- 250: Performed by: STUDENT IN AN ORGANIZED HEALTH CARE EDUCATION/TRAINING PROGRAM

## 2022-01-24 PROCEDURE — 77010033678 HC OXYGEN DAILY

## 2022-01-24 PROCEDURE — A9540 TC99M MAA: HCPCS

## 2022-01-24 PROCEDURE — 94760 N-INVAS EAR/PLS OXIMETRY 1: CPT

## 2022-01-24 PROCEDURE — 74011250636 HC RX REV CODE- 250/636: Performed by: STUDENT IN AN ORGANIZED HEALTH CARE EDUCATION/TRAINING PROGRAM

## 2022-01-24 PROCEDURE — 71045 X-RAY EXAM CHEST 1 VIEW: CPT

## 2022-01-24 PROCEDURE — 92610 EVALUATE SWALLOWING FUNCTION: CPT | Performed by: SPEECH-LANGUAGE PATHOLOGIST

## 2022-01-24 PROCEDURE — 74011250636 HC RX REV CODE- 250/636: Performed by: INTERNAL MEDICINE

## 2022-01-24 PROCEDURE — 74011000258 HC RX REV CODE- 258: Performed by: INTERNAL MEDICINE

## 2022-01-24 PROCEDURE — 74011250637 HC RX REV CODE- 250/637: Performed by: INTERNAL MEDICINE

## 2022-01-24 PROCEDURE — 94640 AIRWAY INHALATION TREATMENT: CPT

## 2022-01-24 PROCEDURE — 65660000000 HC RM CCU STEPDOWN

## 2022-01-24 PROCEDURE — 76770 US EXAM ABDO BACK WALL COMP: CPT

## 2022-01-24 PROCEDURE — 74011250637 HC RX REV CODE- 250/637: Performed by: STUDENT IN AN ORGANIZED HEALTH CARE EDUCATION/TRAINING PROGRAM

## 2022-01-24 PROCEDURE — 80048 BASIC METABOLIC PNL TOTAL CA: CPT

## 2022-01-24 RX ORDER — ACETAMINOPHEN 650 MG/1
650 SUPPOSITORY RECTAL
Status: DISCONTINUED | OUTPATIENT
Start: 2022-01-24 | End: 2022-01-24

## 2022-01-24 RX ORDER — ACETAMINOPHEN 325 MG/1
650 TABLET ORAL
Status: DISCONTINUED | OUTPATIENT
Start: 2022-01-24 | End: 2022-01-24

## 2022-01-24 RX ORDER — METRONIDAZOLE 500 MG/100ML
500 INJECTION, SOLUTION INTRAVENOUS EVERY 12 HOURS
Status: DISCONTINUED | OUTPATIENT
Start: 2022-01-24 | End: 2022-01-25 | Stop reason: HOSPADM

## 2022-01-24 RX ORDER — POLYETHYLENE GLYCOL 3350 17 G/17G
17 POWDER, FOR SOLUTION ORAL DAILY PRN
Status: DISCONTINUED | OUTPATIENT
Start: 2022-01-24 | End: 2022-01-24

## 2022-01-24 RX ORDER — ONDANSETRON 2 MG/ML
4 INJECTION INTRAMUSCULAR; INTRAVENOUS
Status: DISCONTINUED | OUTPATIENT
Start: 2022-01-24 | End: 2022-01-24

## 2022-01-24 RX ORDER — ACETAMINOPHEN 650 MG/1
650 SUPPOSITORY RECTAL
Status: DISCONTINUED | OUTPATIENT
Start: 2022-01-24 | End: 2022-01-25 | Stop reason: HOSPADM

## 2022-01-24 RX ORDER — SODIUM CHLORIDE 0.9 % (FLUSH) 0.9 %
5-40 SYRINGE (ML) INJECTION EVERY 8 HOURS
Status: DISCONTINUED | OUTPATIENT
Start: 2022-01-24 | End: 2022-01-25 | Stop reason: HOSPADM

## 2022-01-24 RX ORDER — ONDANSETRON 4 MG/1
4 TABLET, ORALLY DISINTEGRATING ORAL
Status: DISCONTINUED | OUTPATIENT
Start: 2022-01-24 | End: 2022-01-24

## 2022-01-24 RX ORDER — ONDANSETRON 4 MG/1
4 TABLET, ORALLY DISINTEGRATING ORAL
Status: DISCONTINUED | OUTPATIENT
Start: 2022-01-24 | End: 2022-01-25 | Stop reason: HOSPADM

## 2022-01-24 RX ORDER — ACETAMINOPHEN 325 MG/1
650 TABLET ORAL
Status: DISCONTINUED | OUTPATIENT
Start: 2022-01-24 | End: 2022-01-25 | Stop reason: HOSPADM

## 2022-01-24 RX ORDER — FUROSEMIDE 10 MG/ML
40 INJECTION INTRAMUSCULAR; INTRAVENOUS 2 TIMES DAILY
Status: DISCONTINUED | OUTPATIENT
Start: 2022-01-24 | End: 2022-01-25 | Stop reason: HOSPADM

## 2022-01-24 RX ORDER — ONDANSETRON 2 MG/ML
4 INJECTION INTRAMUSCULAR; INTRAVENOUS
Status: DISCONTINUED | OUTPATIENT
Start: 2022-01-24 | End: 2022-01-25 | Stop reason: HOSPADM

## 2022-01-24 RX ORDER — IPRATROPIUM BROMIDE AND ALBUTEROL SULFATE 2.5; .5 MG/3ML; MG/3ML
3 SOLUTION RESPIRATORY (INHALATION)
Status: DISCONTINUED | OUTPATIENT
Start: 2022-01-24 | End: 2022-01-25 | Stop reason: HOSPADM

## 2022-01-24 RX ORDER — WARFARIN SODIUM 5 MG/1
5 TABLET ORAL ONCE
Status: COMPLETED | OUTPATIENT
Start: 2022-01-24 | End: 2022-01-24

## 2022-01-24 RX ORDER — SODIUM CHLORIDE 0.9 % (FLUSH) 0.9 %
5-40 SYRINGE (ML) INJECTION AS NEEDED
Status: DISCONTINUED | OUTPATIENT
Start: 2022-01-24 | End: 2022-01-24

## 2022-01-24 RX ADMIN — METHYLPREDNISOLONE SODIUM SUCCINATE 40 MG: 40 INJECTION, POWDER, FOR SOLUTION INTRAMUSCULAR; INTRAVENOUS at 14:59

## 2022-01-24 RX ADMIN — CEFEPIME HYDROCHLORIDE 1 G: 1 INJECTION, POWDER, FOR SOLUTION INTRAMUSCULAR; INTRAVENOUS at 22:53

## 2022-01-24 RX ADMIN — SODIUM CHLORIDE, PRESERVATIVE FREE 10 ML: 5 INJECTION INTRAVENOUS at 14:59

## 2022-01-24 RX ADMIN — WARFARIN SODIUM 5 MG: 5 TABLET ORAL at 17:05

## 2022-01-24 RX ADMIN — METOPROLOL SUCCINATE 25 MG: 25 TABLET, EXTENDED RELEASE ORAL at 09:15

## 2022-01-24 RX ADMIN — METHYLPREDNISOLONE SODIUM SUCCINATE 40 MG: 40 INJECTION, POWDER, FOR SOLUTION INTRAMUSCULAR; INTRAVENOUS at 22:53

## 2022-01-24 RX ADMIN — FUROSEMIDE 40 MG: 10 INJECTION, SOLUTION INTRAMUSCULAR; INTRAVENOUS at 17:05

## 2022-01-24 RX ADMIN — FUROSEMIDE 40 MG: 10 INJECTION, SOLUTION INTRAMUSCULAR; INTRAVENOUS at 09:15

## 2022-01-24 RX ADMIN — TRAMADOL HYDROCHLORIDE 50 MG: 50 TABLET, COATED ORAL at 00:26

## 2022-01-24 RX ADMIN — VANCOMYCIN HYDROCHLORIDE 500 MG: 500 INJECTION, POWDER, LYOPHILIZED, FOR SOLUTION INTRAVENOUS at 12:49

## 2022-01-24 RX ADMIN — SODIUM CHLORIDE, PRESERVATIVE FREE 10 ML: 5 INJECTION INTRAVENOUS at 22:00

## 2022-01-24 RX ADMIN — METRONIDAZOLE 500 MG: 500 INJECTION, SOLUTION INTRAVENOUS at 22:54

## 2022-01-24 RX ADMIN — METRONIDAZOLE 500 MG: 500 INJECTION, SOLUTION INTRAVENOUS at 11:09

## 2022-01-24 RX ADMIN — IPRATROPIUM BROMIDE AND ALBUTEROL SULFATE 3 ML: .5; 3 SOLUTION RESPIRATORY (INHALATION) at 13:08

## 2022-01-24 RX ADMIN — IPRATROPIUM BROMIDE AND ALBUTEROL SULFATE 3 ML: .5; 3 SOLUTION RESPIRATORY (INHALATION) at 20:57

## 2022-01-24 NOTE — PROGRESS NOTES
Transition of Care Plan:    RUR: 17%   Disposition: Return back to SNF-[facility]-Hospice Info Session   Follow up appointments: Follow up with PCP and/or Specialist   DME needed: Facility will assist with equipment   Transportation at Discharge: BLS transport-3PM   [place] or means to access home: N/A       IM Medicare Letter: 2nd IM Medicare Letter to be given-daughter signed: Eliecer Watson   Is patient a BCPI-A Bundle:  CM will provide if required       If yes, was Bundle Letter given?:    Is patient a  and connected with the [address]? N/A  If yes, was [employer] transfer form completed and VA notified? Caregiver Contact:Sarah Baker (daughter) [phone] and Juan Jose Avila (daughter) [phone]  Discharge Caregiver contacted prior to discharge? Family to be contacted    UPDATE: 2:25PM    CM received call from [facility], regarding clinicals for review that were sent to snf: [address], via [employer]. [facility] reported that pt is hospice appropriate and facility is willing to accept pt when appropriate to d/c. CM will inform clinical staff of the following. CM will arrange for [facility] transport to assist with medical transport to facility. CM will provide transport packet to pt's nurse. CM will continue to follow. Xavier Ang, LILIANA, [address]      INITIAL NOTE: CM: Xavier Ang is currently working with pt in [facility]. CM is known to be current pt at [facility]. CM staffed case with palliative SW and it was reported that pt's family is currently requesting hospice info session. Pt's daughter are currently speaking with admin coordinator at snf: [facility], discussing hospice placement. CM will confirm with pt's daughter to verify if family are wanting hospice info session. CM received call from Alona Marquis ([phone]), via telephone regarding pts hospice info session.   [facility] reported that pt's daughters have contacted facility to verify if facility offers hospice care. Preston Ang reported that facility accepts hospice pt's and daughters will like referral to be sent to him to review pt's clinicals, via [employer]. CM completed. Preston Ang will follow up with CM once clinicals are completed. CM will continue to follow. PT WILL REQUIRE RAPID COVID TEST AT THE TIME OF D/C.            LILIANA Wiggins, [address] [place] [address]

## 2022-01-24 NOTE — PROGRESS NOTES
Spiritual Care Assessment/Progress Note  [facility]      NAME: Tracy Shields      MRN: [id number]  AGE: 80 y.o. SEX: female  Denominational Affiliation: Christianity   Language: English     1/24/2022     Total Time (in minutes): 20     Spiritual Assessment begun in [facility] through conversation with:         [x]Patient        [x] Family    [] Friend(s)        Reason for Consult: Palliative Care, Initial/Spiritual Assessment     Spiritual beliefs: (Please include comment if needed)     [x] Identifies with a chang tradition:         [x] Supported by a chang community:            [] Claims no spiritual orientation:           [] Seeking spiritual identity:                [] Adheres to an individual form of spirituality:           [] Not able to assess:                           Identified resources for coping:      [] Prayer                               [] Music                  [] Guided Imagery     [x] Family/friends                 [] Pet visits     [] Devotional reading                         [] Unknown     [] Other:                                              Interventions offered during this visit: (See comments for more details)    Patient Interventions: Initial/Spiritual assessment, patient floor,Coping skills reviewed/reinforced,Denominational beliefs/image of God discussed,Iconic (affirming the presence of God/Higher Power),Affirmation of chang,Affirmation of emotions/emotional suffering,Prayer (actual)     Family/Friend(s):  Affirmation of chang     Plan of Care:     [] Support spiritual and/or cultural needs    [] Support AMD and/or advance care planning process      [] Support grieving process   [] Coordinate Rites and/or Rituals    [] Coordination with community clergy   [] No spiritual needs identified at this time   [] Detailed Plan of Care below (See Comments)  [] Make referral to Music Therapy  [] Make referral to Pet Therapy     [] Make referral to Addiction services  [] Make referral to [facility]  [] Make referral to Spiritual Care Partner  [] No future visits requested        [x] Contact Spiritual Care for further referrals     Comments: Initial spiritual assessment with palliative pt in 1121. Pt awake but appeared weak and tired, accompanied by daughter who was providing pt with water.  introduced self and role. Pt and daughter engaged with  self-reporting that pt was doing ok and looking forward to going home tomorrow. Pt has support from both daughters who live close by and take turns in coming to hospital to be with pt. Pt has been a member of [employer] since the age of 15 and daughter is a member there as well. No specific needs expressed.  advised of availability of chaplains and extended prayer of blessing for pt who responded positively. Concluded visit to allow pt rest as she seemed to grow weary. Contact Spiritual Care for any further referrals.   Emma Avalos M.Div, [facility]   Paging Service [phone])

## 2022-01-24 NOTE — PROGRESS NOTES
Palliative Medicine  [place] [phone] - [phone] (COPE)      Diagnoses:  Fredy Eugene is a 80 y.o. female with past medical history of right breast cancer, A. fib on Coumadin, COPD, hypertension was brought in from [facility] for hypoxia. She had a palliative right breast mastectomy done 2 days back at [facility] and was sent to [facility]. She was found by nurse to be more confused today and was hypoxic, needing nonrebreather and was sent to ED. (From H&P). Patient, who goes by [employer]" looks exhausted, she seems to be working a bit hard to breathe (she does have O2 on), she is sleepy. She had just returned from a procedure. She fell asleep shortly after this writer got there. GOALS OF CARE:  Patient has voiced to staff and family that she is \"ready to meet sweet Ino\", family is realistic and hoping to focus on patient's comfort. They are hoping that patient can have hospice services at [facility]. They are awaiting a call from the [facility] representative and want to find out what they say in terms of who they use for hospice, however family is open to meeting with a hospice agency that can serve them. Discussed with Kanwal TSAI about placing a hospice consult for patient. TREATMENT PREFERENCES:   Code Status: DNR    Advance Care Planning:  [] The [facility] Interdisciplinary Team has updated the ACP Navigator with [address] and Patient Capacity    Primary Decision Maker ([address]):   Relationship to patient:  [x] Named in a scanned document   [] Legal Next of Kin  [] Guardian        Family Meeting Documentation    Participants: Patient (introduced self to her, she fell asleep shortly therafter), met with daughter Lucia Vieira who was at bedside. Patient has two daughters, both are closely involved in patient's care and life. Other daughter is Lny Concepcion.  Both daughters live close by and talk often. Michelle Kraus indicated that both she and her sister have discussed a focus on comfort for their mother. Psychosocial: Patient is a long time resident of [address]. She lived in [place] [address] and just recently moved to the Healthcare section as she needed more assistance. Patient made this decision and shared it with her family. Family appears realistic and accepting of changes in patient. They are hoping she can return to [facility] with the support of a focus on comfort/hospice. Patient just had a Mastectomy last Th at [facility], she was sent home on Friday per daughter and was admitted to the hospital the same day secondary to SOB and labored breathing. Per chart, patient pulled out both her chest tubes it appears inadvertently and daughter is waiting to hear if anyone will be able to place them in again. Discussion: We discussed that patient has been through a lot since her surgery and recovery. LCSW talked about her current condition being related to either her recovery or just general decline. Either way, family seems to be leaning towards a focus on comfort oriented care. They would want patient to return to [address]. Family seem realistic. Patient could make a decision if she is alert and awake, but she seems quite weak and is lethargic right now. Outcome / Plan: Consult for hospice asked to be placed (CM to do this), family are waiting to speak to [address] staff to see if they work with a particular hospice. They are waiting to hear back from [facility], [facility].

## 2022-01-24 NOTE — PROGRESS NOTES
Problem: Dysphagia (Adult)  Goal: *Acute Goals and Plan of Care (Insert Text)  1/24/2022 1406 by RUTHIE Fisher  Outcome: Progressing Towards Goal  1/24/2022 1401 by RUTHIE Fisher  Note: Speech Therapy Goals  Initiated 1/24/2022  1. Patient will tolerate minced and moist diet with thin liquids without signs/symptoms of aspiration given minimal cues within 7nday(s). SPEECH LANGUAGE PATHOLOGY BEDSIDE SWALLOW EVALUATION  Patient: Magda Carlos (2 y.o. female)  Date: 1/24/2022  Primary Diagnosis: Sepsis ([facility] [address] [A41.9]  Lactic acidosis [E87.2]  PRACHI (acute kidney injury) ([facility] [address] [N17.9]        Precautions:        ASSESSMENT :  Based on the objective data described below, the patient presents with overall weakness and SOB (RR around 30) and family making plans to maximize patient comfort. She also has poor lip seal and difficulty with anterior fluid loss, of which she is awake and uses a napkin to avoid. She is able to press lips tightly and this deficits is new. Spoke with daughter who reports patient still aware of foods and will not eat puree items that are not normally pureed, likely pureed meat on tray. Is interested in pudding, applesauce, ice cream.     Patient will benefit from skilled intervention to address the above impairments. Patients rehabilitation potential is considered to be Guarded     PLAN :  Recommendations and Planned Interventions:  Full liquid diet which includes purees-- more consistent with patient palate  Hold PO if not alert  Straws okay  D/w family offering preferred foods if and when plan becomes hospice, softening preferred foods, small bites    Frequency/Duration: Patient will be followed by speech-language pathology 2 times a week to address goals. Discharge Recommendations:  To Be Determined, do not expect SLP needs if plan is comfort     SUBJECTIVE:   Patient stated Stop watching me eat in a kind voice, feels self conscious    OBJECTIVE:     Past Medical History:   Diagnosis Date    Arthritis     Atrial fibrillation ([facility] [address] 12/2015    post hip surgery    Chronic obstructive pulmonary disease (HCC)     Gastrointestinal disorder     gallstones    GERD (gastroesophageal reflux disease)     Hypertension     Iron deficiency     Leg ulcer ([facility] [address]     Macular degeneration     OA (osteoarthritis) of shoulder     Spinal stenosis     lumbar    Urinary tract infection 12/23/22    BEING TREATED NOW LAST DOSE TODAY 12/30/21     Past Surgical History:   Procedure Laterality Date    HX HEENT Bilateral     cataract surgery with lens implants    HX MASTECTOMY Right 1/20/2022    RIGHT BREAST MASTECTOMY performed by Tabitha Maya MD at [place] [address]    HX ORTHOPAEDIC  2015    right hip surgery    HX OTHER SURGICAL  12/2021    WOUNDS ON BREAST DUE TO BREAST CANCER, WOUND ON A LEG     HX TONSIL AND ADENOIDECTOMY      18 YRS OLD     WI BREAST SURGERY PROCEDURE UNLISTED  1970'S     CYST IN BOTH BREAST REMOVED      Prior Level of Function/Home Situation:   Home Situation  Support Systems: [address],Child(cliff)  Patient Expects to be Discharged to[de-identified] [address]  Diet prior to admission: unrestricted  Current Diet:  puree   Cognitive and Communication Status:  Neurologic State: Drowsy  Orientation Level: Unable to verbalize  Cognition: Follows commands  Perception:  (opens eyes but mostly fixed gaze.)        Oral Assessment:  Oral Assessment  Labial: Decreased seal (lots of anterior fluid loss with water, but able to purse lips on command)  Dentition: Upper dentures; Lower dentures  Oral Hygiene: tongue is red, looks slightly irritated  Mandible: No impairment  P.O. Trials:  Patient Position: upright in bed  Vocal quality prior to P.O.: Low volume  Consistency Presented: Pudding;Puree; Solid        Bolus Acceptance: No impairment  Bolus Formation/Control:  (needs extra time nad liquid wash with solids)     Propulsion: Discoordination  Oral Residue: Less than 10% of bolus  Initiation of Swallow: Delayed (# of seconds)  Laryngeal Elevation: Functional  Aspiration Signs/Symptoms:  (mild weak cough)  Pharyngeal Phase Characteristics: Effortful swallow; Poor endurance                     NOMS:   The NOMS functional outcome measure was used to quantify this patient's level of swallowing impairment. Based on the NOMS, the patient was determined to be at level 4 for swallow function       NOMS Swallowing Levels:  Level 1 (CN): NPO  Level 2 (CM): NPO but takes consistency in therapy  Level 3 (CL): Takes less than 50% of nutrition p.o. and continues with nonoral feedings; and/or safe with mod cues; and/or max diet restriction  Level 4 (CK): Safe swallow but needs mod cues; and/or mod diet restriction; and/or still requires some nonoral feeding/supplements  Level 5 (CJ): Safe swallow with min diet restriction; and/or needs min cues  Level 6 (CI): Independent with p.o.; rare cues; usually self cues; may need to avoid some foods or needs extra time  Level 7 ([address] [place] [address]): Independent for all p.o.  RUBÉN. (2003). National Outcomes Measurement System (NOMS): Adult Speech-Language Pathology User's Guide. Pain:  Pain Scale 1: Numeric (0 - 10)  Pain Intensity 1: 0       After treatment:   Patient left in no apparent distress in bed, Call bell within reach, and Caregiver / family present    COMMUNICATION/EDUCATION:   Patient was educated [id number] her deficit(s) of dysphagia  as this relates to her diagnosis of SOB. She demonstrated Fair understanding as evidenced by her responses but daughter with good understanding. The patient's plan of care including recommendations, planned interventions, and recommended diet changes were discussed with: Registered nurse. Patient/family agree to work toward stated goals and plan of care.     Thank you for this referral.  RUTHIE Tinsley  Time Calculation: 30 mins

## 2022-01-24 NOTE — PROGRESS NOTES
Patient observed with both KARELY drains pulled out during her sleep, patient stated she thought she was at home. Drains observed lying on her stomach, sutures removed and drains placed in garbage, open area under chest incision dressed with dry dressing. Educated patient on the importance of the drains and to keep the area clean and dry and free from infection.  Will notify surgeon in am.

## 2022-01-24 NOTE — PROGRESS NOTES
Pharmacy Antimicrobial Kinetic Dosing    Indication for Antimicrobials: Sepsis     Current Regimen of Each Antimicrobial:  Vancomycin pharmacy to dose  (Start Date ; Day # 1)  Cefepime 1 g IV q24h (start date , day 3)  Metronidazole 500 mg IV q12h (start , day 1)    Previous Antimicrobial Therapy:  Vanc 1750 mg x 1 on . Consult ordered   Zosyn x 1 on     Goal Level: AUC: 400-600 mg/hr/Liter/day    Date Dose & Interval Measured (mcg/mL) Predicted AUC/BREE                       Date & time of next level:  before 1200 dose. Dosing calculator used: Zyga calculator    Significant Positive Cultures:    urine culture - no growth   blood - no growth 2 days    Conditions for Dosing Consideration: None    Labs:  Recent Labs     22  0353 22  1449   CREA 2.39* 2.44*   BUN 51* 42*     Recent Labs     22  0353 22  1449   WBC 21.0* 20.8*     Temp (24hrs), Av.9 °F (36.6 °C), Min:97.2 °F (36.2 °C), Max:98.7 °F (37.1 °C)        Creatinine Clearance (mL/min):   CrCl (Ideal Body Weight): 12.4   If actual weight < IBW: CrCl (Actual Body Weight) 16.3    Impression/Plan:   Ordered vancomycin 500 mg IV every 24 hours for an anticipated trough of 19.8 and AUC of 538. Will order vanc trough level before  1200 dose. Continue cefepime and metronidazole  Antimicrobial stop date TBD     Pharmacy will follow daily and adjust medications as appropriate for renal function and/or serum levels.     Thank you,  Fer Kevin, PHARMD    Vancomycin Dosing Document    Documents located on pharmacy Teams site: Clinical Practice -> Antimicrobial Stewardship -> Antibiotics_Vancomycin     Aminoglycoside Dosing Document    Documents located on pharmacy Teams site: Clinical Practice -> Antimicrobial Stewardship -> Antibiotics_Aminoglycosides

## 2022-01-24 NOTE — PROGRESS NOTES
5668-  Message left for PICC team regarding labs needed for patient and nursing unable to obtain. PICC team obtained labs around 1200.    1609 - General Surgery consult called to Dr. Celestino Zamora regarding patient's recent mastectomy and patient pulling out drains, message left for the nurses at the office to return call. Nephrology consult called to Dr. Hossein Shankar regarding PRACHI on CKD. End of Shift Note    Bedside shift change report given to Thang Rivas RN (oncoming nurse) by Jaime Ling RN (offgoing nurse). Report included the following information SBAR, Kardex, Intake/Output, MAR and Recent Results    Shift worked:  Day     Shift summary and any significant changes:    Patient has had stable vital signs, nasal cannula at 6 L O2 and patient's O2 is 92%. Concerns for physician to address:  n/a     Zone phone for oncoming shift:  [phone]     Activity:  Activity Level: Up with Assistance  Number times ambulated in hallways past shift: 0  Number of times OOB to chair past shift: 0    Cardiac:   Cardiac Monitoring: Yes      Cardiac Rhythm: Atrial Fib    Access:   Current line(s): PIV     Genitourinary:   Urinary status: mayer    Respiratory:   O2 Device: Nasal cannula  Chronic home O2 use?: NO  Incentive spirometer at bedside: YES     GI:     Current diet:  ADULT DIET Full Liquid  Passing flatus: YES  Tolerating current diet: YES       Pain Management:   Patient states pain is manageable on current regimen: YES    Skin:  Neel Score: 13  Interventions: float heels and increase time out of bed    Patient Safety:  Fall Score:  Total Score: 3  Interventions: gripper socks and pt to call before getting OOB  High Fall Risk: Yes    Length of Stay:  Expected LOS: 3d 14h  Actual LOS: 2      Jaime Ling RN

## 2022-01-24 NOTE — PROGRESS NOTES
Orders received, per chart notes patient is PENNY this morning, will follow up later in the day as she is available. Frannie Infante M.S., CCC-SLP  Speech-Language Pathologist       UPDATE 1230pm: patient was PENNY for large portion of the morning and now having procedure at bedside.  Continuing to follow       Frannie Infante M.S., [id number] [facility]  Speech-Language Pathologist

## 2022-01-24 NOTE — PROGRESS NOTES
Hospitalist Progress Note    NAME: Ezio Ronquillo   :  1925   MRN:  [id number]       Assessment / Plan:    Acute hypoxic respiratory failure  2/2 pulm edema vs pneumonia   COPD exacerbation  Severe sepsis   Lactic acidosis  C/w cefepime, added Flagyl. bcx ntd   On 6liter   Sob this AM  Will increase lasix to 40mg bid   Lactic acidosis trended down but still elevated  CT of the chest showed  IMPRESSION  1. Postoperative changes of recent right mastectomy, with no evidence of fluid  collection or other unexpected postoperative finding. 2. Conglomerate right supraclavicular lymphadenopathy, and multiple enlarged  right axillary lymph nodes, compatible with metastatic disease. 3. Multiple small pulmonary nodules, suspicious for metastatic disease. 4. Small right and trace left pleural effusions with overlying bilateral lower  lobe subsegmental atelectasis. 5. 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 6. Cholelithiasis. 7. Additional incidental findings as above    Repeat chest x-ray this morning showed  New left upper lobe nonspecific airspace disease. 2. Persistent small pleural effusions.       Will hold off on further IV fluid as patient proBNP is significantly elevated with concern of pulmonary edema and last 2D echo showed EF of 30 to 35%    VQ scan showed low probability for PE    History of breast cancer status post right breast mastectomy  Surgery done recently by Dr. Joseline Guerrero   Will put a consult, call the office and left a message  Patient pulled her KARELY drain  Surgical scar on the right breast is intact and noninflammatory  Addendum: Spoke with Dr. Patterson Client over the phone, no need to place back any drain, will monitor for any swelling around the right breast  Dysphagia   -speech therapy   Started on full liquid diet including purées     Afib:  Subtherapeutic INR  INR 1.9, pharmacy consulted for Coumadin dosing    PRACHI on CKD stage 3  Likely cardiorenal, monitor with diuretics  Renal ultrasound  Patient has a Beard  Consult nephrology     HTN:  Resume home meds    25.0 - 29.9 Overweight / Body mass index is 27.48 kg/m². Estimated discharge date: January 25  Barriers:  Discussed with daughter at bedside, all questions answered  Code status: DNR  Prophylaxis: Coumadin  Recommended Disposition: Home w/Family     Subjective:     Chief Complaint / Reason for Physician Visit  Follow-up acute respiratory failure with hypoxia  Patient looks short of breath today   discussed with RN events overnight. Patient is alert oriented x3, reported no complaints    Review of Systems:  Symptom Y/N Comments  Symptom Y/N Comments   Fever/Chills n   Chest Pain     Poor Appetite    Edema     Cough    Abdominal Pain     Sputum    Joint Pain     SOB/ARREOLA y   Pruritis/Rash     Nausea/vomit    Tolerating PT/OT     Diarrhea    Tolerating Diet y    Constipation    Other       Could NOT obtain due to:      Objective:     VITALS:   Last 24hrs VS reviewed since prior progress note. Most recent are:  Patient Vitals for the past 24 hrs:   Temp Pulse Resp BP SpO2   01/24/22 0714 98.7 °F (37.1 °C) 94 24 (!) 166/86 (!) 85 %   01/24/22 0447 97.7 °F (36.5 °C) (!) 103 24 138/70 91 %   01/23/22 2306 97.6 °F (36.4 °C) 65 24 (!) 123/54 91 %   01/23/22 2016 97.2 °F (36.2 °C) 80 22 (!) 111/53 92 %   01/23/22 1529 98.2 °F (36.8 °C) 91 28 113/61 91 %   01/23/22 1107 96.8 °F (36 °C) 68 22 133/62 93 %       Intake/Output Summary (Last 24 hours) at 1/24/2022 0902  Last data filed at 1/24/2022 0447  Gross per 24 hour   Intake 50 ml   Output 450 ml   Net -400 ml        I had a face to face encounter and independently examined this patient on 1/24/2022, as outlined below:  PHYSICAL EXAM:  General: WD, WN. Alert, cooperative, no acute distress    EENT:  EOMI. Anicteric sclerae. MMM  Resp:  CTA bilaterally, no wheezing or rales.   No accessory muscle use  CV:  Regular rhythm,  No edema  GI:  Soft, Non distended, Non tender. +Bowel sounds  Neurologic:  Alert and oriented X 3, normal speech,   Psych:   Good insight. Not anxious nor agitated  Skin:  No rashes. No jaundice    Reviewed most current lab test results and cultures  YES  Reviewed most current radiology test results   YES  Review and summation of old records today    NO  Reviewed patient's current orders and MAR    YES  PMH/SH reviewed - no change compared to H&P  ________________________________________________________________________  Care Plan discussed with:    Comments   Patient y    Family  y    RN y    Care Manager     Consultant                        Multidiciplinary team rounds were held today with , nursing, pharmacist and clinical coordinator. Patient's plan of care was discussed; medications were reviewed and discharge planning was addressed. ________________________________________________________________________  Total NON critical care TIME:45  Minutes    Total CRITICAL CARE TIME Spent:   Minutes non procedure based      Comments   >50% of visit spent in counseling and coordination of care     ________________________________________________________________________  Makayla Foley MD     Procedures: see electronic medical records for all procedures/Xrays and details which were not copied into this note but were reviewed prior to creation of Plan. LABS:  I reviewed today's most current labs and imaging studies.   Pertinent labs include:  Recent Labs     01/23/22  0353 01/22/22  1449   WBC 21.0* 20.8*   HGB 12.1 12.4   HCT 41.2 40.7    262     Recent Labs     01/23/22  0353 01/22/22  1855 01/22/22  1449     --  139   K 4.9  --  4.9     --  101   CO2 30  --  32   *  --  162*   BUN 51*  --  42*   CREA 2.39*  --  2.44*   CA 8.7  --  8.5   ALB  --   --  2.9*   TBILI  --   --  0.6   ALT  --   --  13   INR 1.4* 1.3*  --        Signed: Makayla Foley MD

## 2022-01-24 NOTE — PROGRESS NOTES
Problem: Falls - Risk of  Goal: *Absence of Falls  Description: Document Shane Pace Fall Risk and appropriate interventions in the flowsheet.   Outcome: Progressing Towards Goal  Note: Fall Risk Interventions:  Mobility Interventions: Communicate number of staff needed for ambulation/transfer    Mentation Interventions: Adequate sleep, hydration, pain control    Medication Interventions: Patient to call before getting OOB,Teach patient to arise slowly    Elimination Interventions: Call light in reach              Problem: Patient Education: Go to Patient Education Activity  Goal: Patient/Family Education  Outcome: Progressing Towards Goal     Problem: Breathing Pattern - Ineffective  Goal: *Absence of hypoxia  Outcome: Progressing Towards Goal  Goal: *Use of effective breathing techniques  Outcome: Progressing Towards Goal     Problem: Patient Education: Go to Patient Education Activity  Goal: Patient/Family Education  Outcome: Progressing Towards Goal

## 2022-01-24 NOTE — PROGRESS NOTES
End of Shift Note    Bedside shift change report given to Kayleigh Moore (oncoming nurse) by Dinorah Troy RN (offgoing nurse). Report included the following information SBAR, Kardex and MAR    Shift worked:  7a - 7p     Shift summary and any significant changes:     Pt passed dysphagia screening. Diet order advanced from NPO to dysphagia - pureed per Dr. Darryl Ramsey. Two KARELY drains on R chest from R mastectomy on 1/20, 75 mL serosanguineous fluid output from each drain. Concerns for physician to address:  Pt stated she's \"ready to go home to meet paulette Mckinnon\". Zone phone for oncoming shift:          Activity:  Activity Level: Bed Rest  Number times ambulated in hallways past shift: 0  Number of times OOB to chair past shift: 0    Cardiac:   Cardiac Monitoring: Yes      Cardiac Rhythm: Atrial Fib    Access:   Current line(s): PIV     Genitourinary:   Urinary status: mayer    Respiratory:   O2 Device: Nasal cannula  Chronic home O2 use?: NO  Incentive spirometer at bedside: NO     GI:     Current diet:  ADULT DIET Dysphagia - Pureed  Passing flatus: YES  Tolerating current diet: YES       Pain Management:   Patient states pain is manageable on current regimen: YES    Skin:  Neel Score: 12  Interventions: turn team, float heels, increase time out of bed, foam dressing, PT/OT consult, limit briefs, internal/external urinary devices and nutritional support     Patient Safety:  Fall Score:  Total Score: 3  Interventions: bed/chair alarm, gripper socks, pt to call before getting OOB and stay with me (per policy)  High Fall Risk: Yes    Length of Stay:  Expected LOS: - - -  Actual LOS: 1      Dinorah Troy RN

## 2022-01-24 NOTE — PROGRESS NOTES
Problem: Falls - Risk of  Goal: *Absence of Falls  Description: Document Demetria Acveedo Fall Risk and appropriate interventions in the flowsheet.   Outcome: Progressing Towards Goal  Note: Fall Risk Interventions:  Mobility Interventions: Bed/chair exit alarm    Mentation Interventions: Bed/chair exit alarm    Medication Interventions: Bed/chair exit alarm    Elimination Interventions: Call light in reach

## 2022-01-24 NOTE — PROGRESS NOTES
Pharmacist Daily Dosing of Warfarin    Indication & Goal INR: AFib, INR Goal 2-3    PTA Warfarin Dose: 5 mg daily    Notable concurrent conditions and medications: None    Labs:  Recent Labs     01/24/22  1237 01/23/22  0353 01/23/22  0353 01/22/22  1855 01/22/22  1449 01/22/22  1449   INR 1.9*  --  1.4* 1.3*  --   --    HGB 12.1   < > 12.1  --    < > 12.4     --  267  --   --  262   TBILI  --   --   --   --   --  0.6   ALB  --   --   --   --   --  2.9*    < > = values in this interval not displayed. Impression/Plan:   Will order warfarin 5 mg PO x 1 dose. Daily INR has been ordered  CBC w/o differential every other day has been ordered     Pharmacy will follow daily and adjust the dose as appropriate.     Thank you,  Jack Escalante, PHARMD      Warfarin Protocol    Located on pharmacy Teams site: Clinical Practice -> Anticoagulation & Cardiology -> Anticoagulation Policies, Protocols, Guidance

## 2022-01-25 VITALS
BODY MASS INDEX: 27.51 KG/M2 | WEIGHT: 165.12 LBS | TEMPERATURE: 97.8 F | HEIGHT: 65 IN | SYSTOLIC BLOOD PRESSURE: 146 MMHG | OXYGEN SATURATION: 94 % | RESPIRATION RATE: 18 BRPM | HEART RATE: 94 BPM | DIASTOLIC BLOOD PRESSURE: 67 MMHG

## 2022-01-25 PROCEDURE — 74011000250 HC RX REV CODE- 250: Performed by: INTERNAL MEDICINE

## 2022-01-25 PROCEDURE — 94761 N-INVAS EAR/PLS OXIMETRY MLT: CPT

## 2022-01-25 PROCEDURE — 94640 AIRWAY INHALATION TREATMENT: CPT

## 2022-01-25 PROCEDURE — 74011250637 HC RX REV CODE- 250/637: Performed by: STUDENT IN AN ORGANIZED HEALTH CARE EDUCATION/TRAINING PROGRAM

## 2022-01-25 PROCEDURE — 74011000250 HC RX REV CODE- 250: Performed by: STUDENT IN AN ORGANIZED HEALTH CARE EDUCATION/TRAINING PROGRAM

## 2022-01-25 PROCEDURE — 74011250636 HC RX REV CODE- 250/636: Performed by: INTERNAL MEDICINE

## 2022-01-25 PROCEDURE — 77010033678 HC OXYGEN DAILY

## 2022-01-25 PROCEDURE — 2709999900 HC NON-CHARGEABLE SUPPLY

## 2022-01-25 PROCEDURE — 74011250636 HC RX REV CODE- 250/636: Performed by: STUDENT IN AN ORGANIZED HEALTH CARE EDUCATION/TRAINING PROGRAM

## 2022-01-25 RX ORDER — PREDNISONE 20 MG/1
20 TABLET ORAL
Qty: 5 TABLET | Refills: 0 | Status: SHIPPED | OUTPATIENT
Start: 2022-01-25 | End: 2022-01-30

## 2022-01-25 RX ORDER — METOPROLOL SUCCINATE 25 MG/1
25 TABLET, EXTENDED RELEASE ORAL DAILY
Status: DISCONTINUED | OUTPATIENT
Start: 2022-01-26 | End: 2022-01-25 | Stop reason: HOSPADM

## 2022-01-25 RX ORDER — AMOXICILLIN AND CLAVULANATE POTASSIUM 875; 125 MG/1; MG/1
1 TABLET, FILM COATED ORAL EVERY 12 HOURS
Qty: 14 TABLET | Refills: 0 | Status: SHIPPED | OUTPATIENT
Start: 2022-01-25 | End: 2022-02-01

## 2022-01-25 RX ADMIN — METHYLPREDNISOLONE SODIUM SUCCINATE 40 MG: 40 INJECTION, POWDER, FOR SOLUTION INTRAMUSCULAR; INTRAVENOUS at 05:28

## 2022-01-25 RX ADMIN — METOPROLOL SUCCINATE 25 MG: 25 TABLET, EXTENDED RELEASE ORAL at 10:08

## 2022-01-25 RX ADMIN — IPRATROPIUM BROMIDE AND ALBUTEROL SULFATE 3 ML: .5; 3 SOLUTION RESPIRATORY (INHALATION) at 07:22

## 2022-01-25 RX ADMIN — FUROSEMIDE 40 MG: 10 INJECTION, SOLUTION INTRAMUSCULAR; INTRAVENOUS at 10:08

## 2022-01-25 RX ADMIN — IPRATROPIUM BROMIDE AND ALBUTEROL SULFATE 3 ML: .5; 3 SOLUTION RESPIRATORY (INHALATION) at 13:33

## 2022-01-25 RX ADMIN — SODIUM CHLORIDE, PRESERVATIVE FREE 10 ML: 5 INJECTION INTRAVENOUS at 13:37

## 2022-01-25 RX ADMIN — SODIUM CHLORIDE, PRESERVATIVE FREE 10 ML: 5 INJECTION INTRAVENOUS at 10:29

## 2022-01-25 RX ADMIN — SODIUM CHLORIDE, PRESERVATIVE FREE 10 ML: 5 INJECTION INTRAVENOUS at 05:28

## 2022-01-25 RX ADMIN — METRONIDAZOLE 500 MG: 500 INJECTION, SOLUTION INTRAVENOUS at 10:08

## 2022-01-25 RX ADMIN — IPRATROPIUM BROMIDE AND ALBUTEROL SULFATE 3 ML: .5; 3 SOLUTION RESPIRATORY (INHALATION) at 02:38

## 2022-01-25 RX ADMIN — METHYLPREDNISOLONE SODIUM SUCCINATE 40 MG: 40 INJECTION, POWDER, FOR SOLUTION INTRAMUSCULAR; INTRAVENOUS at 13:37

## 2022-01-25 NOTE — PROGRESS NOTES
0700: Bedside shift change report given to Arlen Moses RN (oncoming nurse) by RN (offgoing nurse). Report included the following information SBAR. 1540: [facility] here to take patient to [facility].

## 2022-01-25 NOTE — PROGRESS NOTES
Problem: Falls - Risk of  Goal: *Absence of Falls  Description: Document Shane Glaserelle Fall Risk and appropriate interventions in the flowsheet. Outcome: Resolved/Met     Problem: Patient Education: Go to Patient Education Activity  Goal: Patient/Family Education  Outcome: Resolved/Met     Problem: Breathing Pattern - Ineffective  Goal: *Absence of hypoxia  Outcome: Resolved/Met  Goal: *Use of effective breathing techniques  Outcome: Resolved/Met     Problem: Patient Education: Go to Patient Education Activity  Goal: Patient/Family Education  Outcome: Resolved/Met     Problem: Pressure Injury - Risk of  Goal: *Prevention of pressure injury  Description: Document Neel Scale and appropriate interventions in the flowsheet.   Outcome: Resolved/Met     Problem: Patient Education: Go to Patient Education Activity  Goal: Patient/Family Education  Outcome: Resolved/Met

## 2022-01-25 NOTE — PROGRESS NOTES
Physician Progress Note      Stephan Rojo  CSN #:                  [id number]  :                       1925  ADMIT DATE:       2022 1:36 PM  [address] [place] DATE:  RESPONDING  PROVIDER #:        Samantha Rincon MD          QUERY TEXT:    Dr Raissa Aguiar:    Patient admitted with  noted to have Acute hypoxic respiratory failure - Likely secondary to Atelectasis + fluid overload. Repeat CXR  show New left upper lobe nonspecific airspace disease. and  Persistent small pleural effusions. ER dx include Pneumonia. If possible, please document in progress notes and discharge summary if you are evaluating and/or treating any of the following: The medical record reflects the following:  Risk Factors: 89yoF s/p partial mastectomy recent, COPD, dysphagia  Clinical Indicators: rc/o SOB, hypoxia in NH with 70s O2 sats on RA, respiratory distress , confused, tachypneic, Leulkocytosis WBC 20.8  SLP eval  dysphagia, pureed diet, pt refusal to eat pureed food    Treatment: IV Zosyn, IV Vanc, IV Cefepime, CT chest & CXR, SLP assessment,    Thank you,  Juanis Florian RN, CDI  Options provided:  -- Pneumonia POA  -- CXR findings not clinically significant  -- Other - I will add my own diagnosis  -- Disagree - Not applicable / Not valid  -- Disagree - Clinically unable to determine / Unknown  -- Refer to Clinical Documentation Reviewer    PROVIDER RESPONSE TEXT:    This patient has Pneumonia POA, please specify type. Query created by: [email or web address] Ferrari on 2022 3:36 PM      QUERY TEXT:    Pt admitted with Acute respiratory failure with hypoxia, COPD exacerbation. Pt noted to have SIRS (Leukocytosis, tachycardia + tachypnea RR 32, lactic acidosis. If possible, please document in the progress notes and discharge summary if you are evaluating and /or treating any of the following:     The medical record reflects the following:  Risk Factors: 89yoF s/p recent R partial mastectomy, COPD, dysphagia  Clinical Indicators:  found to be more confused, respiratory failure RA O2 sats 70s , lactic acid 2.52, Leukocytosis WBC 20K Neutrophil 79, Tachypnea RR  22- 32, HR 65- 103.  1/22 CT results - Multiple small pulmonary nodules, suspicious for metastatic disease. 4. Small right and trace left pleural effusions with overlying bilateral lower  Repeat CXR 1/24 noted  . New left upper lobe nonspecific airspace disease. 2. Persistent small pleural effusions. lobe subsegmental atelectasis  1/22 UA 1+ Bact small LE WBC 10-20  Treatment: IV Zosyn, Cefepime, Vanc, blood  culture, urine cx, supplemental O2. Thank you,  Alexi Glass RN, cDI  Options provided:  -- Sepsis, present on admission  -- Sepsis was ruled out  -- Other - I will add my own diagnosis  -- Disagree - Not applicable / Not valid  -- Disagree - Clinically unable to determine / Unknown  -- Refer to Clinical Documentation Reviewer    PROVIDER RESPONSE TEXT:    This patient has sepsis which was present on admission.     Query created by: Arabella Kim on 1/24/2022 3:50 PM      Electronically signed by:  Courtney Quan MD 1/25/2022 1:30 PM

## 2022-01-25 NOTE — PROGRESS NOTES
1307:   Hospital to [place] [address] [address]                                                                        80 y.o.   female    [facility]   Room: 1121/01    MRM 1 CLINICAL OBS  Unit Phone# :  [address]  MRM [place] [address]  Dept: [phone]  Loc: [phone]                    SITUATION     Admitted:  1/22/2022         Attending Provider:  Yomi Davis MD       Consultations:  IP CONSULT TO HOSPITALIST  IP CONSULT TO PALLIATIVE CARE - PROVIDER  IP CONSULT TO NEPHROLOGY  IP CONSULT TO GENERAL SURGERY    PCP:  Lena Stevenson MD   [phone]    Treatment Team: Attending Provider: Yomi Davis MD; Consulting Provider: Richie Caal MD; Consulting Provider: Lnida Bay NP; Care Manager: Eliud Jimenes; Utilization Review: Karol Painter RN; Care Manager: Migue Gregorio; Consulting Provider: Wayne Machado MD; Consulting Provider: Caleb Kimbrough MD    Admitting Dx:  Sepsis [facility]) [A41.9]  Lactic acidosis [E87.2]  PRACHI (acute kidney injury) ([facility] [address] [N17.9]       Principal Problem: <principal problem not specified>    * No surgery found * of      BY: * Surgery not found *             ON: * No surgery found *                  Code Status: DNR                Advance Directives:   Advance Care Planning 1/21/2022   Patient's Healthcare Decision Maker is: -   Primary Decision Maker Name -   Primary Decision Maker Phone Number -   Confirm Advance Directive None   Patient Would Like to Complete Advance Directive -   Does the patient have other document types -    (Send w/patient)   No Doesnt Have       Isolation:  There are currently no Active Isolations       MDRO: No current active infections    Pain Medications given:  N/A    Last dose: N/A at  N/A    Special Equipment needed: no  Type of equipment:       BACKGROUND     Allergies:   Allergies   Allergen Reactions    Latex Itching  Digoxin Other (comments)     STATES SAW SANAM Mercado        Past Medical History:   Diagnosis Date    Arthritis     Atrial fibrillation ([address] 12/2015    post hip surgery    Chronic obstructive pulmonary disease (HCC)     Gastrointestinal disorder     gallstones    GERD (gastroesophageal reflux disease)     Hypertension     Iron deficiency     Leg ulcer (HCC)     Macular degeneration     OA (osteoarthritis) of shoulder     Spinal stenosis     lumbar    Urinary tract infection 12/23/22    BEING TREATED NOW LAST DOSE TODAY 12/30/21       Past Surgical History:   Procedure Laterality Date    HX HEENT Bilateral     cataract surgery with lens implants    HX MASTECTOMY Right 1/20/2022    RIGHT BREAST MASTECTOMY performed by Carlos George MD at [address] [facility]  2015    right hip surgery    HX OTHER SURGICAL  12/2021    WOUNDS ON BREAST DUE TO BREAST CANCER, WOUND ON A LEG     HX TONSIL AND ADENOIDECTOMY      18 YRS OLD     DC BREAST SURGERY PROCEDURE UNLISTED  1970'S     CYST IN BOTH BREAST REMOVED        Medications Prior to Admission   Medication Sig    warfarin (COUMADIN) 5 mg tablet     traMADoL (ULTRAM) 50 mg tablet Take 1 Tablet by mouth every six (6) hours as needed for Pain for up to 7 days. Max Daily Amount: 200 mg.  furosemide (LASIX) 40 mg tablet Take 40 mg by mouth nightly.  cyanocobalamin, vitamin B-12, (VITAMIN B-12 INJECTION) by Injection route every thirty (30) days. LAST DOSE WAS 12/19/21    traMADoL (ULTRAM) 50 mg tablet Take 50 mg by mouth every eight (8) hours as needed for Pain. 1-2 IF NEEDED    metoprolol succinate (TOPROL-XL) 25 mg XL tablet Take 25 mg by mouth daily.  potassium chloride SR (KLOR-CON 10) 10 mEq tablet Take 10 mEq by mouth two (2) times a day.  furosemide (LASIX) 80 mg tablet Take 80 mg by mouth daily.  acetaminophen (TYLENOL) 500 mg tablet Take 1,000 mg by mouth nightly.        Hard scripts included in transfer packet no    Vaccinations:    Immunization History   Administered Date(s) Administered    COVID-19, Pfizer Purple top, DILUTE for use, 12+ yrs, 30mcg/0.3mL dose 01/25/2021, 02/16/2021, 10/26/2021    Influenza Vaccine 10/01/2017    Tdap 04/16/2015       Readmission Risks:    Known Risks: FALL        The Charlson CoMorbitiy Index tool is an evidenced based tool that has more automatic generated information. The tool looks at many different items such as the age of the patient, how many times they were admitted in the last calendar year, current length of stay in the hospital and their diagnosis. All of these items are pulled automatically from information documented in the chart from various places and will generate a score that predicts whether a patient is at low (less than 13), medium (13-20) or high (21 or greater) risk of being readmitted.         ASSESSMENT                Temp: 97.8 °F (36.6 °C) (01/25/22 1105) Pulse (Heart Rate): 94 (01/25/22 1105)     Resp Rate: 18 (01/25/22 1105)           BP: (!) 146/67 (01/25/22 1105)     O2 Sat (%): 94 % (01/25/22 1105)     Weight: 74.9 kg (165 lb 2 oz)    Height: 5' 5\" (165.1 cm) (01/22/22 1350)       If above not within 1 hour of discharge:    BP:146/67  P:94  R:18 T:97.8 O2 Sat: 94%  O2: 6L    Active Orders   Diet    ADULT DIET Full Liquid         Orientation: oriented to time, place, person and situation     Active Behaviors: None                                   Active Lines/Drains:  (Peg Tube / Escudero / CL or S/L?): yes ESCUDERO    Urinary Status: Escudero     Last BM:       Skin Integrity: Incision (comment)             Mobility: Very limited   Weight Bearing Status: WBAT (Weight Bearing as Tolerated)                Lab Results   Component Value Date/Time    Glucose 191 (H) 01/24/2022 12:37 PM    Hemoglobin A1c 6.6 (H) 01/10/2021 03:29 AM    INR 1.9 (H) 01/24/2022 12:37 PM    INR 1.4 (H) 01/23/2022 03:53 AM    HGB 12.1 01/24/2022 12:37 PM    HGB 12.1 01/23/2022 03:53 AM        RECOMMENDATION     See After Visit Summary (AVS) for:  · Discharge instructions  · After [address]   · Special equipment needed (entered pre-discharge by Care Management)  · Medication Reconciliation    · Follow up Appointment(s)         Report given/sent by:  Eric Ask                    Verbal report given to: Lukas Hickman RN  FAXED to:  Valli Sever         Estimated discharge time:  1/25/2022 at 1500

## 2022-01-25 NOTE — DISCHARGE INSTRUCTIONS
DISCHARGE DIAGNOSIS:  Acute hypoxic respiratory failure  2/2 pulm edema vs pneumonia   COPD exacerbation  Severe sepsis   Lactic acidosis  History of breast cancer status post right breast mastectomy  Dysphagia   -Afib:   PRACHI on CKD stage 3  HTN:      MEDICATIONS:  · It is important that you take the medication exactly as they are prescribed. · Keep your medication in the bottles provided by the pharmacist and keep a list of the medication names, dosages, and times to be taken in your wallet. · Do not take other medications without consulting your doctor. Pain Management: per above medications    What to do at [address]:  Full liquid   Recommended activity: Activity as tolerated    If you have questions regarding the hospital related prescriptions or hospital related issues please call [address] [place] [address] at . You can always direct your questions to your primary care doctor if you are unable to reach your hospital physician; your PCP works as an extension of your hospital doctor just like your hospital doctor is an extension of your PCP for your time at the hospital [facility], [facility]).     If you experience any of the following symptoms then please call your primary care physician or return to the emergency room if you cannot get hold of your doctor:  Fever, chills, nausea, vomiting, diarrhea, change in mentation, falling, bleeding, shortness of breath

## 2022-01-25 NOTE — PROGRESS NOTES
Transition of Care Plan to SNF/Rehab    Communication to Patient/Family:  Met with patient and family and they are agreeable to the transition plan. The Plan for Transition of Care is related to the following treatment goals:     CM informed pt's daughter: Lisha Santiago,, that pt will transition to [facility] on today. CM informed pt's daughter that pt will be transported, via [facility] at [facility] agreeable to the following d/c. Lisha Santiago signed 2nd  Medicare Letter on 1/24/22    The Patient and/or patient representative was provided with a choice of provider and agrees  with the discharge plan. Yes [x] No []    A Freedom of choice list was provided with basic dialogue that supports the patient's individualized plan of care/goals and shares the quality data associated with the providers. Yes [x] No []    SNF/Rehab Transition:  Patient has been accepted to [facility] SNF/Rehab and meets criteria for admission. Patient will transported by [facility]  and expected to leave at [facility].    Communication to SNF/Rehab:  Bedside RN, Michele Ellison, has been notified to update the transition plan to the facility and call report ([phone]). Discharge information has been updated on the AVS. And communicated to facility via WITOI/Busap, or CC link. Discharge instructions to be fax'd to facility at [facility] #). [] BCPI-A  Patient has been identified as part of the BCPI-A Program.  For Care Coordination associated with that Bundle Program, please contact   Bundle information has been communication to. Nursing Please include all hard scripts for controlled substances, med rec and dc summary, and AVS in packet.      Reviewed and confirmed with facility, [facility] , can manage the patient care needs for the following:     [place] with (X) only those applicable:  Medication:  []Medications are available at the facility  []IV Antibiotics    []Controlled Substance  hard copies available sent. []Weekly Labs    Equipment:  []CPAP/BiPAP  []Wound Vacuum  []Beard or Urinary Device  []PICC/Central Line  []Nebulizer  []Ventilator    Treatment:  []Isolation (for MRSA, VRE, etc.)  []Surgical Drain Management  []Tracheostomy Care  []Dressing Changes  []Dialysis with transportation  []PEG Care  [x]Oxygen  []Daily Weights for Heart Failure    Dietary:  []Any diet limitations  []Tube Feedings   []Total Parenteral Management (TPN)    Financial Resources:  []Medicaid Application Completed    []UAI Completed and copy given to pt/family  and copy given to pt/family  []A screening has previously been completed. []Level II Completed    [] Private pay individual who will not become   financially eligible for Medicaid within 6 months from admission to a [address] [place] [address]. [] Individual refused to have screening conducted. []Medicaid Application Completed    []The screening denied because it was determined individual did not need/did not qualify for nursing facility level of care. [] Out of state residents seeking direct admission to a [address] facility. [] Individuals who are inpatients of an out of state hospital, or in state or out of state veterans/ hospital and seek direct admission to a [address] facility  [] Individuals who are pateints or residents of a state owned/operated facility that is licensed by [employer] of [employer] ([employer]) and seek direct admission to [address] [place] [address]  [] A screening not required for enrollment in [address] [place] [address] [address] services as set out in [address] [place] [address] 58-  [] [facility]) staff shall perform screenings of the [facility] clients. Advanced Care Plan:  []Surrogate Decision Maker of Care  []POA  []Communicated Code Status and copy sent.     Other:   Hospice

## 2022-01-25 NOTE — DISCHARGE SUMMARY
Hospitalist Discharge Summary     Patient ID:  Alejandra Mejía  [id number]  82 y.o.  4/30/1925 1/22/2022    PCP on record: Riley Hernandez MD    Admit date: 1/22/2022  Discharge date and time: 1/25/2022    DISCHARGE DIAGNOSIS:  Acute hypoxic respiratory failure  2/2 pulm edema vs pneumonia   COPD exacerbation  Severe sepsis   Lactic acidosis  History of breast cancer status post right breast mastectomy  Dysphagia   -Afib:   PRACHI on CKD stage 3  HTN:    CONSULTATIONS:  IP CONSULT TO HOSPITALIST  IP CONSULT TO PALLIATIVE CARE - PROVIDER  IP CONSULT TO NEPHROLOGY  IP CONSULT TO GENERAL SURGERY    Excerpted HPI from H&P of Hieu Hurt MD:  CHIEF COMPLAINT: Hypoxia     HISTORY OF PRESENT ILLNESS:     Alexa Lopez is a 80 y.o. female with past medical history of right breast cancer, A. fib on Coumadin, COPD, hypertension was brought in from [facility] for hypoxia. She had a palliative right breast mastectomy done 2 days back at [facility] and was sent to [facility]. She was found by nurse to be more confused today and was hypoxic, needing nonrebreather and was sent to ED. On my evaluation she is on 6 L oxygen. She has some mild pain at the right breast incision site. No cough, fever, belly pain, change in bladder or bowel habits. She has no     We were asked to admit for work up and evaluation of the above problems. ______________________________________________________________________  DISCHARGE SUMMARY/HOSPITAL COURSE:  for full details see H&P, daily progress notes, labs, consult notes. Acute hypoxic respiratory failure  2/2 pulm edema vs pneumonia   COPD exacerbation  Severe sepsis   Lactic acidosis  S/p cefepime, added Flagyl. bcx ntd, continue to be on 6 L  Status post IV Lasix switched to p.o. as patient is being discharged back to her assisted living with hospice    CT of the chest showed  IMPRESSION  1.  Postoperative changes of recent right mastectomy, with no evidence of fluid  collection or other unexpected postoperative finding. 2. Conglomerate right supraclavicular lymphadenopathy, and multiple enlarged  right axillary lymph nodes, compatible with metastatic disease. 3. Multiple small pulmonary nodules, suspicious for metastatic disease. 4. Small right and trace left pleural effusions with overlying bilateral lower  lobe subsegmental atelectasis. 5. 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 6. Cholelithiasis. 7. Additional incidental findings as above     Repeat chest x-ray this morning showed  New left upper lobe nonspecific airspace disease.   2. Persistent small pleural effusions.      Will hold off on further IV fluid as patient proBNP is significantly elevated with concern of pulmonary edema and last 2D echo showed EF of 30 to 35%     VQ scan showed low probability for PE     History of breast cancer status post right breast mastectomy  Surgery done recently by Dr. Isma Pierce   Will put a consult, call the office and left a message  Patient pulled her KARELY drain  Surgical scar on the right breast is intact and noninflammatory  Addendum: Spoke with Dr. Risa Huang over the phone, no need to place back any drain, will monitor for any swelling around the right breast  Dysphagia   -speech therapy   Started on full liquid diet including purées     Afib:  Subtherapeutic INR  INR 1.9, continue with home dose warfarin     PRACHI on CKD stage 3  Likely cardiorenal, monitor with diuretics  Renal ultrasound showed no hydronephrosis  Patient has a Beard  Consult nephrology canceled as patient being discharged to hospice     HTN:  Resume home meds         Patient's pain discharge back to the assisted facility with hospice upon patient and family request.  Hospice already set up at the facility      _______________________________________________________________________  Patient seen and examined by me on discharge day. Pertinent Findings:  Gen:    Not in distress  Chest: Clear lungs  CVS:   Regular rhythm. No edema  Abd:  Soft, not distended, not tender  Neuro:  Alert, oriented x3  _______________________________________________________________________  DISCHARGE MEDICATIONS:   Current Discharge Medication List      START taking these medications    Details   amoxicillin-clavulanate (Augmentin) 875-125 mg per tablet Take 1 Tablet by mouth every twelve (12) hours for 7 days. Qty: 14 Tablet, Refills: 0  Start date: 1/25/2022, End date: 2/1/2022      predniSONE (DELTASONE) 20 mg tablet Take 20 mg by mouth daily (with breakfast) for 5 days. Qty: 5 Tablet, Refills: 0  Start date: 1/25/2022, End date: 1/30/2022         CONTINUE these medications which have NOT CHANGED    Details   warfarin (COUMADIN) 5 mg tablet       !! traMADoL (ULTRAM) 50 mg tablet Take 1 Tablet by mouth every six (6) hours as needed for Pain for up to 7 days. Max Daily Amount: 200 mg. Qty: 15 Tablet, Refills: 0    Associated Diagnoses: Malignant neoplasm of right female breast, unspecified estrogen receptor status, unspecified site of breast ([facility] [address]      ! ! furosemide (LASIX) 40 mg tablet Take 40 mg by mouth nightly. cyanocobalamin, vitamin B-12, (VITAMIN B-12 INJECTION) by Injection route every thirty (30) days. LAST DOSE WAS 12/19/21      !! traMADoL (ULTRAM) 50 mg tablet Take 50 mg by mouth every eight (8) hours as needed for Pain. 1-2 IF NEEDED      metoprolol succinate (TOPROL-XL) 25 mg XL tablet Take 25 mg by mouth daily. potassium chloride SR (KLOR-CON 10) 10 mEq tablet Take 10 mEq by mouth two (2) times a day. !! furosemide (LASIX) 80 mg tablet Take 80 mg by mouth daily. acetaminophen (TYLENOL) 500 mg tablet Take 1,000 mg by mouth nightly. !! - Potential duplicate medications found. Please discuss with provider. Patient Follow Up Instructions:    Activity: Bedrest   diet: Full liquid diet  Wound Care: None needed        Follow-up Information     Follow up With Specialties Details Why Contact Info    Greyson Maddox MD [address]  [phone]          ________________________________________________________________    Risk of deterioration: High    Condition at Discharge:  Stable  __________________________________________________________________    Disposition  Home with hospice services    ____________________________________________________________________    Code Status: DNR/DNI  ___________________________________________________________________      Total time in minutes spent coordinating this discharge (includes going over instructions, follow-up, prescriptions, and preparing report for sign off to her PCP) :  >30 minutes    Signed:  Damian Cardoza MD

## 2022-02-02 PROBLEM — C50.911 MALIGNANT NEOPLASM OF RIGHT FEMALE BREAST (HCC): Status: ACTIVE | Noted: 2022-02-02

## 2022-02-22 ENCOUNTER — TELEPHONE (OUTPATIENT)
Dept: SURGERY | Age: 87
End: 2022-02-22

## 2022-02-22 NOTE — TELEPHONE ENCOUNTER
Received letter from patients daughter that patient  on . Spoke with her, expressed condolences and discussed breast cancer risk for her, her sister and daughter.

## (undated) DEVICE — CURVED, SMALL JAW, OPEN SEALER/DIVIDER: Brand: LIGASURE

## (undated) DEVICE — SPONGE LAP 18X18IN STRL -- 5/PK

## (undated) DEVICE — GOWN,SIRUS,FABRNF,XL,20/CS: Brand: MEDLINE

## (undated) DEVICE — PACK,BASIC,SIRUS,V: Brand: MEDLINE

## (undated) DEVICE — SUTURE MCRYL SZ 4-0 L27IN ABSRB UD L19MM PS-2 1/2 CIR PRIM Y426H

## (undated) DEVICE — Device

## (undated) DEVICE — SUTURE MCRYL SZ 3-0 L27IN ABSRB UD L24MM PS-1 3/8 CIR PRIM Y936H

## (undated) DEVICE — INTENDED FOR TISSUE SEPARATION, AND OTHER PROCEDURES THAT REQUIRE A SHARP SURGICAL BLADE TO PUNCTURE OR CUT.: Brand: BARD-PARKER ® CARBON RIB-BACK BLADES

## (undated) DEVICE — SUTURE VCRL SZ 2-0 L27IN ABSRB UD L26MM SH 1/2 CIR J417H

## (undated) DEVICE — PLASTICS CHEST BREAST ASU: Brand: MEDLINE INDUSTRIES, INC.

## (undated) DEVICE — DRAIN SURG 19FR 100% SIL RADPQ RND CHN FULL FLUT

## (undated) DEVICE — SUTURE VCRL SZ 3-0 L27IN ABSRB UD L26MM SH 1/2 CIR J416H

## (undated) DEVICE — INSULATED BLADE ELECTRODE: Brand: EDGE

## (undated) DEVICE — SOLUTION IRRIG 1000ML 0.9% SOD CHL USP POUR PLAS BTL

## (undated) DEVICE — ADHESIVE SKIN CLOSURE TOP 36 CC HI VISC DERMBND MINI

## (undated) DEVICE — RESERVOIR,SUCTION,100CC,SILICONE: Brand: MEDLINE

## (undated) DEVICE — GLOVE ORANGE PI 7 1/2   MSG9075

## (undated) DEVICE — PENCIL SMK EVAC L10FT DIA95MM TBNG NONSTICK W ADPT TO 22MM

## (undated) DEVICE — 450 ML BOTTLE OF 0.05% CHLORHEXIDINE GLUCONATE IN 99.95% STERILE WATER FOR IRRIGATION, USP AND APPLICATOR.: Brand: IRRISEPT ANTIMICROBIAL WOUND LAVAGE

## (undated) DEVICE — SUTURE PERMAHAND SZ 2-0 L18IN NONABSORBABLE BLK L26MM PS 1588H

## (undated) DEVICE — COVER,MAYO STAND,STERILE: Brand: MEDLINE